# Patient Record
Sex: MALE | Race: WHITE | Employment: OTHER | URBAN - METROPOLITAN AREA
[De-identification: names, ages, dates, MRNs, and addresses within clinical notes are randomized per-mention and may not be internally consistent; named-entity substitution may affect disease eponyms.]

---

## 2017-02-17 ENCOUNTER — TRANSFERRED RECORDS (OUTPATIENT)
Dept: HEALTH INFORMATION MANAGEMENT | Facility: CLINIC | Age: 72
End: 2017-02-17

## 2017-09-11 ENCOUNTER — OFFICE VISIT (OUTPATIENT)
Dept: INTERNAL MEDICINE | Facility: CLINIC | Age: 72
End: 2017-09-11
Payer: COMMERCIAL

## 2017-09-11 VITALS
HEIGHT: 72 IN | SYSTOLIC BLOOD PRESSURE: 124 MMHG | OXYGEN SATURATION: 94 % | TEMPERATURE: 98.1 F | BODY MASS INDEX: 21.36 KG/M2 | WEIGHT: 157.7 LBS | HEART RATE: 88 BPM | DIASTOLIC BLOOD PRESSURE: 78 MMHG

## 2017-09-11 DIAGNOSIS — Z13.6 CARDIOVASCULAR SCREENING; LDL GOAL LESS THAN 160: ICD-10-CM

## 2017-09-11 DIAGNOSIS — J43.9 PULMONARY EMPHYSEMA, UNSPECIFIED EMPHYSEMA TYPE (H): Primary | ICD-10-CM

## 2017-09-11 DIAGNOSIS — Z12.11 COLON CANCER SCREENING: ICD-10-CM

## 2017-09-11 LAB
ANION GAP SERPL CALCULATED.3IONS-SCNC: 9 MMOL/L (ref 3–14)
BUN SERPL-MCNC: 18 MG/DL (ref 7–30)
CALCIUM SERPL-MCNC: 9.3 MG/DL (ref 8.5–10.1)
CHLORIDE SERPL-SCNC: 101 MMOL/L (ref 94–109)
CO2 SERPL-SCNC: 28 MMOL/L (ref 20–32)
CREAT SERPL-MCNC: 0.88 MG/DL (ref 0.66–1.25)
GFR SERPL CREATININE-BSD FRML MDRD: 84 ML/MIN/1.7M2
GLUCOSE SERPL-MCNC: 91 MG/DL (ref 70–99)
POTASSIUM SERPL-SCNC: 4.3 MMOL/L (ref 3.4–5.3)
SODIUM SERPL-SCNC: 138 MMOL/L (ref 133–144)

## 2017-09-11 PROCEDURE — 36415 COLL VENOUS BLD VENIPUNCTURE: CPT | Performed by: INTERNAL MEDICINE

## 2017-09-11 PROCEDURE — 99214 OFFICE O/P EST MOD 30 MIN: CPT | Performed by: INTERNAL MEDICINE

## 2017-09-11 PROCEDURE — 80048 BASIC METABOLIC PNL TOTAL CA: CPT | Performed by: INTERNAL MEDICINE

## 2017-09-11 RX ORDER — TIOTROPIUM BROMIDE 18 UG/1
18 CAPSULE ORAL; RESPIRATORY (INHALATION) DAILY
Qty: 30 CAPSULE | Refills: 11 | Status: SHIPPED | OUTPATIENT
Start: 2017-09-11 | End: 2018-09-10

## 2017-09-11 RX ORDER — ALBUTEROL SULFATE 90 UG/1
1-2 AEROSOL, METERED RESPIRATORY (INHALATION) EVERY 4 HOURS PRN
Qty: 1 INHALER | Refills: 11 | Status: SHIPPED | OUTPATIENT
Start: 2017-09-11 | End: 2018-09-10

## 2017-09-11 NOTE — NURSING NOTE
"Chief Complaint   Patient presents with     COPD       Initial /78  Pulse 88  Temp 98.1  F (36.7  C) (Oral)  Ht 5' 11.5\" (1.816 m)  Wt 157 lb 11.2 oz (71.5 kg)  SpO2 94%  BMI 21.69 kg/m2 Estimated body mass index is 21.69 kg/(m^2) as calculated from the following:    Height as of this encounter: 5' 11.5\" (1.816 m).    Weight as of this encounter: 157 lb 11.2 oz (71.5 kg).  Medication Reconciliation: complete    "

## 2017-09-11 NOTE — MR AVS SNAPSHOT
"              After Visit Summary   9/11/2017    Flavia Luu    MRN: [id number]           Patient Information     Date Of Birth          1945        Visit Information        Provider Department      9/11/2017 2:00 PM Filemon Fletcher MD [facility]        Today's Diagnoses     Pulmonary emphysema, unspecified emphysema type (H)    -  1    CARDIOVASCULAR SCREENING; LDL GOAL LESS THAN 160        Colon cancer screening           Follow-ups after your visit        Future tests that were ordered for you today     Open Future Orders        Priority Expected Expires Ordered    Fecal colorectal cancer screen (FIT) Routine 10/2/2017 12/4/2017 9/11/2017    Basic metabolic panel Routine  9/11/2018 9/11/2017            Who to contact     If you have questions or need follow up information about today's clinic visit or your schedule please contact [facility] directly at [phone].  Normal or non-critical lab and imaging results will be communicated to you by MyChart, letter or phone within 4 business days after the clinic has received the results. If you do not hear from us within 7 days, please contact the clinic through [employer] or phone. If you have a critical or abnormal lab result, we will notify you by phone as soon as possible.  Submit refill requests through [employer] or call your pharmacy and they will forward the refill request to us. Please allow 3 business days for your refill to be completed.          Additional Information About Your Visit        MyChart Information     [employer]Class lets you send messages to your doctor, view your test results, renew your prescriptions, schedule appointments and more. To sign up, go to [email or web address] . Click on \"Log in\" on the left side of the screen, which will take you to the Welcome page. Then click on \"Sign up Now\" on the right side of the page.     You will be asked to enter the access code listed below, as well " "as some personal information. Please follow the directions to create your username and password.     Your access code is: [email or web address]  Expires: 12/10/2017  2:40 PM     Your access code will  in 90 days. If you need help or a new code, please call your [place] clinic or [phone].        Care EveryWhere ID     This is your Care EveryWhere ID. This could be used by other organizations to access your [place] medical records  [id number]        Your Vitals Were     Pulse Temperature Height Pulse Oximetry BMI (Body Mass Index)       88 98.1  F (36.7  C) (Oral) 5' 11.5\" (1.816 m) 94% 21.69 kg/m2        Blood Pressure from Last 3 Encounters:   17 124/78   16 120/66   12/01/15 112/62    Weight from Last 3 Encounters:   17 157 lb 11.2 oz (71.5 kg)   16 158 lb 11.2 oz (72 kg)   12/01/15 161 lb 1.6 oz (73.1 kg)                 Where to get your medicines      These medications were sent to [id number] - [facility] [address]  [address] [address]     Phone:  [phone]     albuterol 108 (90 BASE) MCG/ACT Inhaler    fluticasone-salmeterol 500-50 MCG/DOSE diskus inhaler    tiotropium 18 MCG capsule          Primary Care Provider Office Phone # Fax #    Filemon Fletcher [phone]       [address] [address]        Equal Access to Services     AASHISH CERVANTES : Hadchristopher Agudelo, adria benavidez, qajoseph rowan. So [facility] [phone].    ATENCIÓN: Si habla español, tiene a rico disposición servicios gratuitos de asistencia lingüística. Llame al [phone].    We comply with applicable federal civil rights laws and [place] laws. We do not discriminate on the basis of race, color, national origin, age, disability sex, sexual orientation or gender identity.            Thank you!     Thank you for choosing [facility]  for " your care. Our goal is always to provide you with excellent care. Hearing back from our patients is one way we can continue to improve our services. Please take a few minutes to complete the written survey that you may receive in the mail after your visit with us. Thank you!             Your Updated Medication List - Protect others around you: Learn how to safely use, store and throw away your medicines at www.disposemymeds.org.          This list is accurate as of: 9/11/17  2:40 PM.  Always use your most recent med list.                   Brand Name Dispense Instructions for use Diagnosis    albuterol 108 (90 BASE) MCG/ACT Inhaler    PROAIR HFA    1 Inhaler    Inhale 1-2 puffs into the lungs every 4 hours as needed for shortness of breath / dyspnea    Pulmonary emphysema, unspecified emphysema type (H)       fluticasone-salmeterol 500-50 MCG/DOSE diskus inhaler    ADVAIR    1 Inhaler    Inhale 1 puff into the lungs 2 times daily    Pulmonary emphysema, unspecified emphysema type (H)       IBUPROFEN PO      Take 200 mg by mouth as needed.        ipratropium - albuterol 0.5 mg/2.5 mg/3 mL 0.5-2.5 (3) MG/3ML neb solution    DUONEB    120 vial    Take 1 vial (3 mLs) by nebulization every 4 hours as needed for shortness of breath / dyspnea    Pulmonary emphysema, unspecified emphysema type (H)       tiotropium 18 MCG capsule    SPIRIVA HANDIHALER    30 capsule    Inhale 1 capsule (18 mcg) into the lungs daily Inhale contents of one capsule    Pulmonary emphysema, unspecified emphysema type (H)

## 2017-09-11 NOTE — PROGRESS NOTES
"  SUBJECTIVE:   Flavia Luu is a 72 year old male who presents to clinic today for the following health issues:      COPD Follow-Up    Symptoms are currently: slightly worsened    Current fatigue or dyspnea with ambulation: stable     Shortness of breath: slightly worsened    Increased or change in Cough/Sputum: No    Fever(s): No    Baseline ambulation without stopping to rest:  .75 blocks. Able to walk up 1 flights of stairs without stopping to rest.    Any ER/UC or hospital admissions since your last visit? No     History   Smoking Status     Former Smoker     Packs/day: 1.00     Years: 43.00     Types: Cigarettes     Quit date: 12/8/2008   Smokeless Tobacco     Never Used       Amount of exercise or physical activity: None    Problems taking medications regularly: No    Medication side effects: none  Diet: regular (no restrictions)    Problem list and histories reviewed & adjusted, as indicated.  Additional history: as documented    Labs reviewed in EPIC    Reviewed and updated as needed this visit by clinical staffAllergies       Reviewed and updated as needed this visit by Provider         ROS:  Constitutional, HEENT, cardiovascular, pulmonary, gi and gu systems are negative, except as otherwise noted.      OBJECTIVE:                                                    /78  Pulse 88  Temp 98.1  F (36.7  C) (Oral)  Ht 5' 11.5\" (1.816 m)  Wt 157 lb 11.2 oz (71.5 kg)  SpO2 94%  BMI 21.69 kg/m2  Body mass index is 21.69 kg/(m^2).  GENERAL APPEARANCE: alert, no distress and thin  HENT: nose and mouth without ulcers or lesions  NECK: no adenopathy, no asymmetry, masses, or scars and thyroid normal to palpation  RESP: lungs clear to auscultation - no rales, rhonchi or wheezes  CV: regular rates and rhythm, normal S1 S2, no S3 or S4 and no murmur, click or rub  ABDOMEN: soft, nontender, without hepatosplenomegaly or masses and bowel sounds normal  MS: extremities normal- no gross deformities noted  SKIN: " no suspicious lesions or rashes    Diagnostic test results:  pending       ASSESSMENT/PLAN:                                                    1. Pulmonary emphysema, unspecified emphysema type (H)  Stable- continue inhalers  - albuterol (PROAIR HFA) 108 (90 BASE) MCG/ACT Inhaler; Inhale 1-2 puffs into the lungs every 4 hours as needed for shortness of breath / dyspnea  Dispense: 1 Inhaler; Refill: 11  - tiotropium (SPIRIVA HANDIHALER) 18 MCG capsule; Inhale 1 capsule (18 mcg) into the lungs daily Inhale contents of one capsule  Dispense: 30 capsule; Refill: 11  - fluticasone-salmeterol (ADVAIR) 500-50 MCG/DOSE diskus inhaler; Inhale 1 puff into the lungs 2 times daily  Dispense: 1 Inhaler; Refill: 11    2. CARDIOVASCULAR SCREENING; LDL GOAL LESS THAN 160  - Basic metabolic panel    3. Colon cancer screening  - Fecal colorectal cancer screen (FIT); Future      Follow up with Provider - 6 mo      Filemon Fletcher MD  [facility]

## 2017-09-11 NOTE — LETTER
September 11, 2017      Flavia Luu  [address]  [place] [address]        Dear ,    We are writing to inform you of your test results.    Your test results fall within the expected range(s) or remain unchanged from previous results.  Please continue with current treatment plan.    Resulted Orders   Basic metabolic panel   Result Value Ref Range    Sodium 138 133 - 144 mmol/L    Potassium 4.3 3.4 - 5.3 mmol/L    Chloride 101 94 - 109 mmol/L    Carbon Dioxide 28 20 - 32 mmol/L    Anion Gap 9 3 - 14 mmol/L    Glucose 91 70 - 99 mg/dL    Urea Nitrogen 18 7 - 30 mg/dL    Creatinine 0.88 0.66 - 1.25 mg/dL    GFR Estimate 84 >60 mL/min/1.7m2      Comment:      Non  GFR Calc    GFR Estimate If Black >90 >60 mL/min/1.7m2      Comment:       GFR Calc    Calcium 9.3 8.5 - 10.1 mg/dL       If you have any questions or concerns, please call the clinic at the number listed above.       Sincerely,        Filemon Fletcher MD

## 2017-09-14 DIAGNOSIS — J43.9 PULMONARY EMPHYSEMA, UNSPECIFIED EMPHYSEMA TYPE (H): ICD-10-CM

## 2017-09-14 RX ORDER — IPRATROPIUM BROMIDE AND ALBUTEROL SULFATE 2.5; .5 MG/3ML; MG/3ML
1 SOLUTION RESPIRATORY (INHALATION) EVERY 4 HOURS PRN
Qty: 120 VIAL | Refills: 11 | Status: ON HOLD | OUTPATIENT
Start: 2017-09-14 | End: 2018-07-01

## 2017-09-14 NOTE — TELEPHONE ENCOUNTER
duoneb       Last Written Prescription Date: 09/12/16  Last Fill Quantity: 120vial, # refills: 11    Last Office Visit with G, P or [facility] prescribing provider:  09/11/17   Future Office Visit:   0    Date of Last Asthma Action Plan Letter:   There are no preventive care reminders to display for this patient.   Asthma Control Test: No flowsheet data found.    Date of Last Spirometry Test:   No results found for this or any previous visit.

## 2017-10-02 PROCEDURE — 82274 ASSAY TEST FOR BLOOD FECAL: CPT | Performed by: INTERNAL MEDICINE

## 2017-10-04 DIAGNOSIS — Z12.11 COLON CANCER SCREENING: ICD-10-CM

## 2017-10-04 LAB — HEMOCCULT STL QL IA: NEGATIVE

## 2017-11-26 ENCOUNTER — TELEPHONE (OUTPATIENT)
Dept: INTERNAL MEDICINE | Facility: CLINIC | Age: 72
End: 2017-11-26

## 2017-11-26 NOTE — TELEPHONE ENCOUNTER
Pt would like to be seen  tomorrow  for an emphysema  Flair  Up. Please call pt to advise.      Thank you,   Ankita MYERS   Central Scheduling  [phone]

## 2017-11-27 ENCOUNTER — OFFICE VISIT (OUTPATIENT)
Dept: INTERNAL MEDICINE | Facility: CLINIC | Age: 72
End: 2017-11-27
Payer: COMMERCIAL

## 2017-11-27 VITALS
WEIGHT: 156.2 LBS | SYSTOLIC BLOOD PRESSURE: 136 MMHG | OXYGEN SATURATION: 91 % | DIASTOLIC BLOOD PRESSURE: 82 MMHG | HEART RATE: 109 BPM | TEMPERATURE: 97.9 F | BODY MASS INDEX: 21.48 KG/M2

## 2017-11-27 DIAGNOSIS — J43.9 PULMONARY EMPHYSEMA, UNSPECIFIED EMPHYSEMA TYPE (H): Primary | ICD-10-CM

## 2017-11-27 PROCEDURE — 99214 OFFICE O/P EST MOD 30 MIN: CPT | Performed by: INTERNAL MEDICINE

## 2017-11-27 RX ORDER — PREDNISONE 10 MG/1
TABLET ORAL
Qty: 42 TABLET | Refills: 0 | Status: SHIPPED | OUTPATIENT
Start: 2017-11-27 | End: 2017-12-17

## 2017-11-27 NOTE — TELEPHONE ENCOUNTER
"Flavia Luu is a 72 year old male who calls with emphysema flare up.    NURSING ASSESSMENT:  Description:  States his breathing is bad again. Has had this happen before and says he comes here and gets steroids. Fine when sitting still, but gets very short of breath with movement- breathing feels restricted. Denies any feelings of suffocation. This episode is \"much better\" than the last time he had this happen. Denies chest pain. Denies wheezing. Denies progressively worsening SOB. Able to breathe lying down after a few minutes of adjusting. Denies fever. Denies any difficulty taking a deep breath d/t severe pain.  Onset/duration:  Since Saturday  Associated symptoms:  See above  ILast exam/Treatment:  9/11/17  Allergies:   Allergies   Allergen Reactions     No Known Drug Allergies        NURSING PLAN: Huddle with provider, plan includes schedule with partner today that has opening    RECOMMENDED DISPOSITION:  3 pm appointment today with Dr. Cano  Will comply with recommendation: Yes  If further questions/concerns or if symptoms do not improve, worsen or new symptoms develop, call your PCP or [facility] as soon as possible.      Guideline used:  Telephone Triage Protocols for Nurses, Fifth Edition, Saima Metz RN    "

## 2017-11-27 NOTE — MR AVS SNAPSHOT
"              After Visit Summary   11/27/2017    Flavia Luu    MRN: [id number]           Patient Information     Date Of Birth          1945        Visit Information        Provider Department      11/27/2017 3:00 PM Saman Cano MD [facility][1]        Today's Diagnoses     Pulmonary emphysema, unspecified emphysema type (H)    -  1      Care Instructions    *  Worsening of the COPD.     *  No evidence for bacterial infection based on exam and history,  no antibiotics indicated.     *  Prednisone tapering course over the next 20 days:  40mg qday x4 day, then 30mg qday x 4 day, then 20mg qday x 4 day, then 10mg qday x 4 day, 5mg po qday X 4 days then D/C     *  Continue all other medications at the same doses.  Contact your usual pharmacy if you need refills.     *  If your breathing gets significantly worse, go to the ER without delay.      *  Keep an eye out for the \"generic Advair\" called AirDuo.  This may be replacing Advair on many formularies in the next year.            Follow-ups after your visit        Who to contact     If you have questions or need follow up information about today's clinic visit or your schedule please contact [facility] directly at [phone].  Normal or non-critical lab and imaging results will be communicated to you by [employer], letter or phone within 4 business days after the clinic has received the results. If you do not hear from us within 7 days, please contact the clinic through [employer] or phone. If you have a critical or abnormal lab result, we will notify you by phone as soon as possible.  Submit refill requests through [employer] or call your pharmacy and they will forward the refill request to us. Please allow 3 business days for your refill to be completed.          Additional Information About Your Visit        [employer] Information     [employer] lets you send messages to your doctor, view your test results, " "renew your prescriptions, schedule appointments and more. To sign up, go to [email or web address] . Click on \"Log in\" on the left side of the screen, which will take you to the Welcome page. Then click on \"Sign up Now\" on the right side of the page.     You will be asked to enter the access code listed below, as well as some personal information. Please follow the directions to create your username and password.     Your access code is: [email or web address]  Expires: 12/10/2017  1:40 PM     Your access code will  in 90 days. If you need help or a new code, please call your [place] clinic or [phone].        Care EveryWhere ID     This is your Care EveryWhere ID. This could be used by other organizations to access your [place] medical records  [id number]        Your Vitals Were     Pulse Temperature Pulse Oximetry BMI (Body Mass Index)          109 97.9  F (36.6  C) (Oral) 91% 21.48 kg/m2         Blood Pressure from Last 3 Encounters:   17 136/82   17 124/78   16 120/66    Weight from Last 3 Encounters:   17 156 lb 3.2 oz (70.9 kg)   17 157 lb 11.2 oz (71.5 kg)   16 158 lb 11.2 oz (72 kg)              Today, you had the following     No orders found for display         Today's Medication Changes          These changes are accurate as of: 17  3:48 PM.  If you have any questions, ask your nurse or doctor.               Start taking these medicines.        Dose/Directions    predniSONE 10 MG tablet   Commonly known as:  DELTASONE   Used for:  Pulmonary emphysema, unspecified emphysema type (H)   Started by:  Saman Cano MD        40mg qday x4 day, then 30mg qday x 4 day, then 20mg qday x 4 day, then 10mg qday x 4 day, 5mg po qday X 4 days then D/C   Quantity:  42 tablet   Refills:  0            Where to get your medicines      These medications were sent to [id number] - [place], [place] - [address]  [address] [address]     Phone:  [phone]     predniSONE 10 MG tablet                Primary Care Provider Office Phone # Fax #    Filemon Fletcher [phone]       [address] [address]        Equal Access to Services     MALIK CERVANTES : Hadii aad ku hadasho Soomaali, waaxda luqadaha, qaybta kaalmada adeegyada, waxay desireein haymaryjon aroldoprakash loaizakatalina gooden. So [facility] [phone].    ATENCIÓN: Si habla español, tiene a rico disposición servicios gratuitos de asistencia lingüística. Llame al [phone].    We comply with applicable federal civil rights laws and [place] laws. We do not discriminate on the basis of race, color, national origin, age, disability, sex, sexual orientation, or gender identity.            Thank you!     Thank you for choosing [facility]  for your care. Our goal is always to provide you with excellent care. Hearing back from our patients is one way we can continue to improve our services. Please take a few minutes to complete the written survey that you may receive in the mail after your visit with us. Thank you!             Your Updated Medication List - Protect others around you: Learn how to safely use, store and throw away your medicines at www.disposemymeds.org.          This list is accurate as of: 11/27/17  3:48 PM.  Always use your most recent med list.                   Brand Name Dispense Instructions for use Diagnosis    albuterol 108 (90 BASE) MCG/ACT Inhaler    PROAIR HFA    1 Inhaler    Inhale 1-2 puffs into the lungs every 4 hours as needed for shortness of breath / dyspnea    Pulmonary emphysema, unspecified emphysema type (H)       fluticasone-salmeterol 500-50 MCG/DOSE diskus inhaler    ADVAIR    1 Inhaler    Inhale 1 puff into the lungs 2 times daily    Pulmonary emphysema, unspecified emphysema type (H)       IBUPROFEN PO      Take 200 mg by mouth as needed.        ipratropium - albuterol 0.5 mg/2.5 mg/3 mL 0.5-2.5 (3)  MG/3ML neb solution    DUONEB    120 vial    Take 1 vial (3 mLs) by nebulization every 4 hours as needed for shortness of breath / dyspnea    Pulmonary emphysema, unspecified emphysema type (H)       predniSONE 10 MG tablet    DELTASONE    42 tablet    40mg qday x4 day, then 30mg qday x 4 day, then 20mg qday x 4 day, then 10mg qday x 4 day, 5mg po qday X 4 days then D/C    Pulmonary emphysema, unspecified emphysema type (H)       tiotropium 18 MCG capsule    SPIRIVA HANDIHALER    30 capsule    Inhale 1 capsule (18 mcg) into the lungs daily Inhale contents of one capsule    Pulmonary emphysema, unspecified emphysema type (H)

## 2017-11-27 NOTE — PATIENT INSTRUCTIONS
"*  Worsening of the COPD.     *  No evidence for bacterial infection based on exam and history,  no antibiotics indicated.     *  Prednisone tapering course over the next 20 days:  40mg qday x4 day, then 30mg qday x 4 day, then 20mg qday x 4 day, then 10mg qday x 4 day, 5mg po qday X 4 days then D/C     *  Continue all other medications at the same doses.  Contact your usual pharmacy if you need refills.     *  If your breathing gets significantly worse, go to the ER without delay.      *  Keep an eye out for the \"generic Advair\" called AirDuo.  This may be replacing Advair on many formularies in the next year.    "

## 2017-11-27 NOTE — NURSING NOTE
"Chief Complaint   Patient presents with     COPD     increased SOB  X 3 days       Initial /82  Pulse 109  Temp 97.9  F (36.6  C) (Oral)  Wt 156 lb 3.2 oz (70.9 kg)  SpO2 91%  BMI 21.48 kg/m2 Estimated body mass index is 21.48 kg/(m^2) as calculated from the following:    Height as of 9/11/17: 5' 11.5\" (1.816 m).    Weight as of this encounter: 156 lb 3.2 oz (70.9 kg).  Medication Reconciliation: complete   Misty Demarco CMA      "

## 2017-11-27 NOTE — PROGRESS NOTES
SUBJECTIVE:   Flavia Luu is a 72 year old male who presents to clinic today for the following health issues:      COPD Follow-Up    Symptoms are currently: much worse    Current fatigue or dyspnea with ambulation: worsened from baseline    Shortness of breath: much worse    Increased or change in Cough/Sputum: Yes-  Slightly increased    Fever(s): No    Baseline ambulation without stopping to rest:  .75 blocks. Able to walk up 1 flights of stairs without stopping to rest.    Any ER/UC or hospital admissions since your last visit? No     History   Smoking Status     Former Smoker     Packs/day: 1.00     Years: 43.00     Types: Cigarettes     Quit date: 12/8/2008   Smokeless Tobacco     Never Used     No results found for: FEV1, KLP5OLL      Amount of exercise or physical activity: None    Problems taking medications regularly: No    Medication side effects: none    Diet: regular (no restrictions)            Problem list and histories reviewed & adjusted, as indicated.  Additional history: as documented        Reviewed and updated as needed this visit by clinical staffTobacco  Allergies       Reviewed and updated as needed this visit by Provider           Past Medical History:  ---------------------------  Past Medical History:   Diagnosis Date     Atrial flutter (H)     sp ablation     COPD (chronic obstructive pulmonary disease) (H)      Emphysema of lung (H)      PVC (premature ventricular contraction)     LVOT, moderately frequent     Tobacco use disorder        Past Surgical History:  ---------------------------  Past Surgical History:   Procedure Laterality Date     C NONSPECIFIC PROCEDURE      vv stripping on left     CATARACT IOL, RT/LT  2012    bilat     EYE SURGERY       H ABLATION ATRIAL FLUTTER  1/17/2013            Current Medications:  ---------------------------  Current Outpatient Prescriptions   Medication Sig Dispense Refill     ipratropium - albuterol 0.5 mg/2.5 mg/3 mL (DUONEB) 0.5-2.5 (3)  MG/3ML neb solution Take 1 vial (3 mLs) by nebulization every 4 hours as needed for shortness of breath / dyspnea 120 vial 11     albuterol (PROAIR HFA) 108 (90 BASE) MCG/ACT Inhaler Inhale 1-2 puffs into the lungs every 4 hours as needed for shortness of breath / dyspnea 1 Inhaler 11     tiotropium (SPIRIVA HANDIHALER) 18 MCG capsule Inhale 1 capsule (18 mcg) into the lungs daily Inhale contents of one capsule 30 capsule 11     fluticasone-salmeterol (ADVAIR) 500-50 MCG/DOSE diskus inhaler Inhale 1 puff into the lungs 2 times daily 1 Inhaler 11     IBUPROFEN PO Take 200 mg by mouth as needed.         Allergies:  -------------  Allergies   Allergen Reactions     No Known Drug Allergies        Social History:  -------------------  Social History     Social History     Marital status:      Spouse name: N/A     Number of children: 2     Years of education: N/A     Occupational History      [employer]     Social History Main Topics     Smoking status: Former Smoker     Packs/day: 1.00     Years: 43.00     Types: Cigarettes     Quit date: 12/8/2008     Smokeless tobacco: Never Used     Alcohol use 0.0 oz/week     0 Standard drinks or equivalent per week      Comment: very rare     Drug use: No     Sexual activity: Not Currently     Other Topics Concern     Not on file     Social History Narrative       Family Medical History:  ------------------------------  Family History   Problem Relation Age of Onset     CANCER Mother      DIABETES Father          ROS:  REVIEW OF SYSTEMS:    RESP: positive for cough, dyspnea, wheezing; negative for hemoptysis   CV: negative for chest pain, palpitations, PND, orthopnea;  GI: negative for dysphagia, N/V, pain, melena, diarrhea and constipation  NEURO: negative for numbness/tingling, paralysis, incoordination, or focal weakness     OBJECTIVE:                                                    /82  Pulse 109  Temp 97.9  F (36.6  C) (Oral)  Wt 156 lb 3.2 oz  "(70.9 kg)  SpO2 91%  BMI 21.48 kg/m2     GENERAL alert and no distress  EYES:  Normal sclera,conjunctiva, EOMI  HENT: oral and posterior pharynx without lesions or erythema, facies symmetric  NECK: Neck supple. No LAD, without thyroidmegaly or JVD., Carotids without bruits.  RESP: breath sounds quiet but clear, diminished respiratory excursion, prolonged expiratory phase, few scattered  rhonci, scattered end exp. wheezes, no rales   CV: RRR normal S1S2 without murmurs, rubs or gallops. PMI normal  LYMPH: no cervical lymph adenopathy appreciated  MS: extremities- no gross deformities of the visible extremities noted, no edema  PSYCH: Alert and oriented times 3; speech- coherent  SKIN:  No obvious significant skin lesions on visible portions of face          ASSESSMENT/PLAN:                                                      (J43.9) Pulmonary emphysema, unspecified emphysema type (H)  (primary encounter diagnosis)  Comment: Discussed general issues of COPD, including pathophysiology, ways it will affect the pt., when to seek help, reviewed the typical medications (how they are taken, how they help)   Plan: predniSONE (DELTASONE) 10 MG tablet             *  Worsening of the COPD.     *  No evidence for bacterial infection based on exam and history,  no antibiotics indicated.     *  Prednisone tapering course over the next 20 days:  40mg qday x4 day, then 30mg qday x 4 day, then 20mg qday x 4 day, then 10mg qday x 4 day, 5mg po qday X 4 days then D/C     *  Continue all other medications at the same doses.  Contact your usual pharmacy if you need refills.     *  If your breathing gets significantly worse, go to the ER without delay.      *  Keep an eye out for the \"generic Advair\" called AirDuo.  This may be replacing Advair on many formularies in the next year.        See Patient Instructions    CHARITY NEGRO M.D., MD  [facility]   "

## 2018-01-09 ENCOUNTER — RADIANT APPOINTMENT (OUTPATIENT)
Dept: GENERAL RADIOLOGY | Facility: CLINIC | Age: 73
End: 2018-01-09
Attending: INTERNAL MEDICINE
Payer: COMMERCIAL

## 2018-01-09 ENCOUNTER — OFFICE VISIT (OUTPATIENT)
Dept: INTERNAL MEDICINE | Facility: CLINIC | Age: 73
End: 2018-01-09
Payer: COMMERCIAL

## 2018-01-09 VITALS
OXYGEN SATURATION: 90 % | DIASTOLIC BLOOD PRESSURE: 86 MMHG | SYSTOLIC BLOOD PRESSURE: 144 MMHG | HEIGHT: 71 IN | HEART RATE: 120 BPM | TEMPERATURE: 97.4 F | WEIGHT: 154.7 LBS | BODY MASS INDEX: 21.66 KG/M2

## 2018-01-09 DIAGNOSIS — J44.1 CHRONIC OBSTRUCTIVE PULMONARY DISEASE WITH ACUTE EXACERBATION (H): ICD-10-CM

## 2018-01-09 DIAGNOSIS — J44.1 CHRONIC OBSTRUCTIVE PULMONARY DISEASE WITH ACUTE EXACERBATION (H): Primary | ICD-10-CM

## 2018-01-09 PROCEDURE — 71046 X-RAY EXAM CHEST 2 VIEWS: CPT | Mod: FY

## 2018-01-09 PROCEDURE — 99214 OFFICE O/P EST MOD 30 MIN: CPT | Performed by: INTERNAL MEDICINE

## 2018-01-09 RX ORDER — DOXYCYCLINE HYCLATE 100 MG
100 TABLET ORAL 2 TIMES DAILY
Qty: 14 TABLET | Refills: 0 | Status: SHIPPED | OUTPATIENT
Start: 2018-01-09 | End: 2018-04-27

## 2018-01-09 RX ORDER — SULFAMETHOXAZOLE/TRIMETHOPRIM 800-160 MG
1 TABLET ORAL 2 TIMES DAILY
Qty: 14 TABLET | Refills: 0 | Status: CANCELLED | OUTPATIENT
Start: 2018-01-09

## 2018-01-09 RX ORDER — PREDNISONE 10 MG/1
TABLET ORAL
Qty: 42 TABLET | Refills: 0 | Status: SHIPPED | OUTPATIENT
Start: 2018-01-09 | End: 2018-06-28

## 2018-01-09 NOTE — PROGRESS NOTES
"  SUBJECTIVE:   Flavia Luu is a 72 year old male who presents to clinic today for the following health issues:        COPD flare    Notes more recent increase in HENRIQUEZ, wheezing, use of nebs and productive cough over last 10 d    Symptoms are currently: much worse    Current fatigue or dyspnea with ambulation: none    Shortness of breath: much worse    Increased or change in Cough/Sputum: Yes-  worse    Fever(s): No    Baseline ambulation without stopping to rest:  Not much , < 1 rooms. Able to walk up <1 flights of stairs without stopping to rest.    Any ER/UC or hospital admissions since your last visit? No     History   Smoking Status     Former Smoker     Packs/day: 1.00     Years: 43.00     Types: Cigarettes     Quit date: 12/8/2008   Smokeless Tobacco     Never Used         Amount of exercise or physical activity: None    Problems taking medications regularly: No    Medication side effects: none    Diet: regular (no restrictions)      Problem list and histories reviewed & adjusted, as indicated.  Additional history: as documented    Labs reviewed in EPIC    Reviewed and updated as needed this visit by clinical staffTobacco  Allergies  Meds  Med Hx  Surg Hx  Fam Hx  Soc Hx      Reviewed and updated as needed this visit by Provider         ROS:  Constitutional, HEENT, cardiovascular, pulmonary, gi and gu systems are negative, except as otherwise noted.      OBJECTIVE:                                                    /86  Pulse 120  Temp 97.4  F (36.3  C) (Oral)  Ht 5' 10.75\" (1.797 m)  Wt 154 lb 11.2 oz (70.2 kg)  SpO2 90%  BMI 21.73 kg/m2  Body mass index is 21.73 kg/(m^2).  GENERAL APPEARANCE: alert, no distress, fatigued and thin  HENT: nose and mouth without ulcers or lesions and normal cephalic/atraumatic  NECK: no adenopathy, no asymmetry, masses, or scars and thyroid normal to palpation  RESP: greatly diminished BS bilat. No wheezing or crackles  CV: regular rates and rhythm, normal " S1 S2, no S3 or S4 and no murmur, click or rub  SKIN: no suspicious lesions or rashes    Diagnostic test results:  Cxr: small nodular infiltrate ? L upper lobe on AP, otherwise nothing acute       ASSESSMENT/PLAN:                                                    1. Chronic obstructive pulmonary disease with acute exacerbation (H)  antibx given productive sputum + steroids  - XR Chest 2 Views; Future  - predniSONE (DELTASONE) 10 MG tablet; 4 tabs daily for 6 days, then 3 tabs daily for 3 days, then 2 tabs daily for 3 days, then 1 tab daily for 3 days, then stop  Dispense: 42 tablet; Refill: 0  - doxycycline (VIBRA-TABS) 100 MG tablet; Take 1 tablet (100 mg) by mouth 2 times daily  Dispense: 14 tablet; Refill: 0        Filemon Fletcher MD  [facility]

## 2018-01-09 NOTE — MR AVS SNAPSHOT
"              After Visit Summary   2018    Flavia Luu    MRN: [id number]           Patient Information     Date Of Birth          1945        Visit Information        Provider Department      2018 1:20 PM Filemon Fletcher MD [facility]        Today's Diagnoses     Chronic obstructive pulmonary disease with acute exacerbation (H)    -  1       Follow-ups after your visit        Who to contact     If you have questions or need follow up information about today's clinic visit or your schedule please contact [facility] directly at [phone].  Normal or non-critical lab and imaging results will be communicated to you by MyChart, letter or phone within 4 business days after the clinic has received the results. If you do not hear from us within 7 days, please contact the clinic through [employer] or phone. If you have a critical or abnormal lab result, we will notify you by phone as soon as possible.  Submit refill requests through [employer] or call your pharmacy and they will forward the refill request to us. Please allow 3 business days for your refill to be completed.          Additional Information About Your Visit        MyChart Information     [employer] lets you send messages to your doctor, view your test results, renew your prescriptions, schedule appointments and more. To sign up, go to [email or web address] . Click on \"Log in\" on the left side of the screen, which will take you to the Welcome page. Then click on \"Sign up Now\" on the right side of the page.     You will be asked to enter the access code listed below, as well as some personal information. Please follow the directions to create your username and password.     Your access code is: [email or web address]  Expires: 2018  2:26 PM     Your access code will  in 90 days. If you need help or a new code, please call your [facility] or [phone].        Care EveryWhere ID     This is " "your Care EveryWhere ID. This could be used by other organizations to access your [place] medical records  [id number]        Your Vitals Were     Pulse Temperature Height Pulse Oximetry BMI (Body Mass Index)       120 97.4  F (36.3  C) (Oral) 5' 10.75\" (1.797 m) 90% 21.73 kg/m2        Blood Pressure from Last 3 Encounters:   01/09/18 144/86   11/27/17 136/82   09/11/17 124/78    Weight from Last 3 Encounters:   01/09/18 154 lb 11.2 oz (70.2 kg)   11/27/17 156 lb 3.2 oz (70.9 kg)   09/11/17 157 lb 11.2 oz (71.5 kg)                 Today's Medication Changes          These changes are accurate as of: 1/9/18  2:26 PM.  If you have any questions, ask your nurse or doctor.               Start taking these medicines.        Dose/Directions    doxycycline 100 MG tablet   Commonly known as:  VIBRA-TABS   Used for:  Chronic obstructive pulmonary disease with acute exacerbation (H)   Started by:  Filemon Fletcher MD        Dose:  100 mg   Take 1 tablet (100 mg) by mouth 2 times daily   Quantity:  14 tablet   Refills:  0       predniSONE 10 MG tablet   Commonly known as:  DELTASONE   Used for:  Chronic obstructive pulmonary disease with acute exacerbation (H)   Started by:  Filemon Fletcher MD        4 tabs daily for 6 days, then 3 tabs daily for 3 days, then 2 tabs daily for 3 days, then 1 tab daily for 3 days, then stop   Quantity:  42 tablet   Refills:  0            Where to get your medicines      These medications were sent to [id number] - [place], [place] - [address]  [address] [address]     Phone:  [phone]     doxycycline 100 MG tablet    predniSONE 10 MG tablet                Primary Care Provider Office Phone # Fax #    Filemon Fletcher [phone]       [address] [facility] [address]        Equal Access to Services     MALIK CERVANTES AH: Nasima Agudelo, adria lubharti, joseph field " pedro reynoldscoleenkatalina templeton'aan ah. So [facility] [phone].    ATENCIÓN: Si jelean jack, tiene a rico disposición servicios gratuitos de asistencia lingüística. Suhail al [phone].    We comply with applicable federal civil rights laws and [place] laws. We do not discriminate on the basis of race, color, national origin, age, disability, sex, sexual orientation, or gender identity.            Thank you!     Thank you for choosing [facility]  for your care. Our goal is always to provide you with excellent care. Hearing back from our patients is one way we can continue to improve our services. Please take a few minutes to complete the written survey that you may receive in the mail after your visit with us. Thank you!             Your Updated Medication List - Protect others around you: Learn how to safely use, store and throw away your medicines at www.disposemymeds.org.          This list is accurate as of: 1/9/18  2:26 PM.  Always use your most recent med list.                   Brand Name Dispense Instructions for use Diagnosis    albuterol 108 (90 BASE) MCG/ACT Inhaler    PROAIR HFA    1 Inhaler    Inhale 1-2 puffs into the lungs every 4 hours as needed for shortness of breath / dyspnea    Pulmonary emphysema, unspecified emphysema type (H)       doxycycline 100 MG tablet    VIBRA-TABS    14 tablet    Take 1 tablet (100 mg) by mouth 2 times daily    Chronic obstructive pulmonary disease with acute exacerbation (H)       fluticasone-salmeterol 500-50 MCG/DOSE diskus inhaler    ADVAIR    1 Inhaler    Inhale 1 puff into the lungs 2 times daily    Pulmonary emphysema, unspecified emphysema type (H)       IBUPROFEN PO      Take 200 mg by mouth as needed.        ipratropium - albuterol 0.5 mg/2.5 mg/3 mL 0.5-2.5 (3) MG/3ML neb solution    DUONEB    120 vial    Take 1 vial (3 mLs) by nebulization every 4 hours as needed for shortness of breath / dyspnea    Pulmonary emphysema, unspecified emphysema type  (H)       predniSONE 10 MG tablet    DELTASONE    42 tablet    4 tabs daily for 6 days, then 3 tabs daily for 3 days, then 2 tabs daily for 3 days, then 1 tab daily for 3 days, then stop    Chronic obstructive pulmonary disease with acute exacerbation (H)       tiotropium 18 MCG capsule    SPIRIVA HANDIHALER    30 capsule    Inhale 1 capsule (18 mcg) into the lungs daily Inhale contents of one capsule    Pulmonary emphysema, unspecified emphysema type (H)

## 2018-02-20 ENCOUNTER — TRANSFERRED RECORDS (OUTPATIENT)
Dept: HEALTH INFORMATION MANAGEMENT | Facility: CLINIC | Age: 73
End: 2018-02-20

## 2018-04-27 ENCOUNTER — OFFICE VISIT (OUTPATIENT)
Dept: INTERNAL MEDICINE | Facility: CLINIC | Age: 73
End: 2018-04-27
Payer: COMMERCIAL

## 2018-04-27 VITALS
SYSTOLIC BLOOD PRESSURE: 110 MMHG | OXYGEN SATURATION: 94 % | HEART RATE: 81 BPM | TEMPERATURE: 97.5 F | WEIGHT: 156.3 LBS | BODY MASS INDEX: 21.95 KG/M2 | DIASTOLIC BLOOD PRESSURE: 84 MMHG

## 2018-04-27 DIAGNOSIS — J43.9 PULMONARY EMPHYSEMA, UNSPECIFIED EMPHYSEMA TYPE (H): Primary | ICD-10-CM

## 2018-04-27 DIAGNOSIS — J44.1 COPD EXACERBATION (H): ICD-10-CM

## 2018-04-27 PROCEDURE — 99214 OFFICE O/P EST MOD 30 MIN: CPT | Performed by: INTERNAL MEDICINE

## 2018-04-27 NOTE — PROGRESS NOTES
SUBJECTIVE:   Flavia Luu is a 72 year old male who presents to clinic today for the following health issues:      COPD Follow-Up    Symptoms are currently: slightly worsened, similar to how it was in january    Current fatigue or dyspnea with ambulation: worsened from baseline    Shortness of breath: slightly worsened    Increased or change in Cough/Sputum: Yes-  Has some yellow mucous but slightly better than it was in january    Fever(s): No    Baseline ambulation without stopping to rest: Can walk to mailbox and back without stopping to rest. Able to walk up 1 flights of stairs without stopping to rest.  Any ER/UC or hospital admissions since your last visit? No   94% o2 after sitting for 5-10 minutes    History   Smoking Status     Former Smoker     Packs/day: 1.00     Years: 43.00     Types: Cigarettes     Quit date: 12/8/2008   Smokeless Tobacco     Never Used       Amount of exercise or physical activity: 4-5 days/week for an average of 15-30 minutes    Problems taking medications regularly: No    Medication side effects: none    Diet: regular (no restrictions)    Has been noticing some decreased healing time with minor infections while on the prednisone and is wondering if that decreases the benefit of taking the steroid.      Problem list and histories reviewed & adjusted, as indicated.  Additional history: as documented    Labs reviewed in EPIC    Reviewed and updated as needed this visit by clinical staff       Reviewed and updated as needed this visit by Provider         ROS:  Constitutional, HEENT, cardiovascular, pulmonary, gi and gu systems are negative, except as otherwise noted.    OBJECTIVE:                                                    /84 (BP Location: Right arm, Patient Position: Chair, Cuff Size: Adult Regular)  Pulse 81  Temp 97.5  F (36.4  C) (Oral)  Wt 156 lb 4.8 oz (70.9 kg)  SpO2 94%  BMI 21.95 kg/m2  Body mass index is 21.95 kg/(m^2).  GENERAL APPEARANCE: alert and no  distress  HENT: ear canals and TM's normal and nose and mouth without ulcers or lesions  NECK: no adenopathy, no asymmetry, masses, or scars and thyroid normal to palpation  RESP: Diminished breath sounds with some faint wheezing noted bilaterally  CV: regular rates and rhythm, normal S1 S2, no S3 or S4 and no murmur, click or rub    Diagnostic test results:  none      ASSESSMENT/PLAN:                                                    1. Pulmonary emphysema, unspecified emphysema type (H)  2. COPD exacerbation (H)  - amoxicillin-clavulanate (AUGMENTIN) 875-125 MG per tablet; Take 1 tablet by mouth 2 times daily for 7 days  Dispense: 14 tablet; Refill: 0           Filemon Fletcher MD  [facility]

## 2018-06-28 ENCOUNTER — HOSPITAL ENCOUNTER (INPATIENT)
Facility: CLINIC | Age: 73
LOS: 3 days | Discharge: HOME | DRG: 190 | End: 2018-07-01
Attending: EMERGENCY MEDICINE | Admitting: HOSPITALIST
Payer: COMMERCIAL

## 2018-06-28 ENCOUNTER — APPOINTMENT (OUTPATIENT)
Dept: CT IMAGING | Facility: CLINIC | Age: 73
DRG: 190 | End: 2018-06-28
Attending: EMERGENCY MEDICINE
Payer: COMMERCIAL

## 2018-06-28 ENCOUNTER — OFFICE VISIT (OUTPATIENT)
Dept: INTERNAL MEDICINE | Facility: CLINIC | Age: 73
End: 2018-06-28
Payer: COMMERCIAL

## 2018-06-28 ENCOUNTER — RADIANT APPOINTMENT (OUTPATIENT)
Dept: GENERAL RADIOLOGY | Facility: CLINIC | Age: 73
End: 2018-06-28
Attending: INTERNAL MEDICINE
Payer: COMMERCIAL

## 2018-06-28 VITALS
SYSTOLIC BLOOD PRESSURE: 122 MMHG | DIASTOLIC BLOOD PRESSURE: 74 MMHG | HEART RATE: 118 BPM | TEMPERATURE: 100.3 F | WEIGHT: 158.6 LBS | OXYGEN SATURATION: 96 % | BODY MASS INDEX: 22.28 KG/M2 | RESPIRATION RATE: 32 BRPM

## 2018-06-28 DIAGNOSIS — R06.02 SOB (SHORTNESS OF BREATH): ICD-10-CM

## 2018-06-28 DIAGNOSIS — J44.1 COPD EXACERBATION (H): ICD-10-CM

## 2018-06-28 DIAGNOSIS — Z98.890 S/P ABLATION OF ATRIAL FLUTTER: Primary | ICD-10-CM

## 2018-06-28 DIAGNOSIS — R50.9 FEVER, UNSPECIFIED FEVER CAUSE: ICD-10-CM

## 2018-06-28 DIAGNOSIS — J43.1 PANLOBULAR EMPHYSEMA (H): Primary | ICD-10-CM

## 2018-06-28 DIAGNOSIS — Z86.79 S/P ABLATION OF ATRIAL FLUTTER: Primary | ICD-10-CM

## 2018-06-28 LAB
ANION GAP SERPL CALCULATED.3IONS-SCNC: 9 MMOL/L (ref 3–14)
BASOPHILS # BLD AUTO: 0 10E9/L (ref 0–0.2)
BASOPHILS NFR BLD AUTO: 0.4 %
BUN SERPL-MCNC: 20 MG/DL (ref 7–30)
CALCIUM SERPL-MCNC: 9.2 MG/DL (ref 8.5–10.1)
CHLORIDE SERPL-SCNC: 97 MMOL/L (ref 94–109)
CO2 SERPL-SCNC: 27 MMOL/L (ref 20–32)
CREAT SERPL-MCNC: 0.88 MG/DL (ref 0.66–1.25)
CREAT SERPL-MCNC: 1 MG/DL (ref 0.66–1.25)
D DIMER PPP FEU-MCNC: 4.6 UG/ML FEU (ref 0–0.5)
DIFFERENTIAL METHOD BLD: ABNORMAL
EOSINOPHIL # BLD AUTO: 0 10E9/L (ref 0–0.7)
EOSINOPHIL NFR BLD AUTO: 0 %
ERYTHROCYTE [DISTWIDTH] IN BLOOD BY AUTOMATED COUNT: 14.1 % (ref 10–15)
GFR SERPL CREATININE-BSD FRML MDRD: 73 ML/MIN/1.7M2
GFR SERPL CREATININE-BSD FRML MDRD: 84 ML/MIN/1.7M2
GLUCOSE SERPL-MCNC: 112 MG/DL (ref 70–99)
HCT VFR BLD AUTO: 48.8 % (ref 40–53)
HGB BLD-MCNC: 16 G/DL (ref 13.3–17.7)
INTERPRETATION ECG - MUSE: NORMAL
LACTATE BLD-SCNC: 1.6 MMOL/L (ref 0.4–1.9)
LYMPHOCYTES # BLD AUTO: 0.2 10E9/L (ref 0.8–5.3)
LYMPHOCYTES NFR BLD AUTO: 3.8 %
MCH RBC QN AUTO: 32.3 PG (ref 26.5–33)
MCHC RBC AUTO-ENTMCNC: 32.8 G/DL (ref 31.5–36.5)
MCV RBC AUTO: 99 FL (ref 78–100)
MONOCYTES # BLD AUTO: 0.7 10E9/L (ref 0–1.3)
MONOCYTES NFR BLD AUTO: 13.6 %
NEUTROPHILS # BLD AUTO: 3.9 10E9/L (ref 1.6–8.3)
NEUTROPHILS NFR BLD AUTO: 82.2 %
PLATELET # BLD AUTO: 140 10E9/L (ref 150–450)
PLATELET # BLD AUTO: 152 10E9/L (ref 150–450)
POTASSIUM SERPL-SCNC: 4.9 MMOL/L (ref 3.4–5.3)
PROCALCITONIN SERPL-MCNC: 0.62 NG/ML
PROCALCITONIN SERPL-MCNC: NORMAL NG/ML
RBC # BLD AUTO: 4.95 10E12/L (ref 4.4–5.9)
SODIUM SERPL-SCNC: 133 MMOL/L (ref 133–144)
TROPONIN I SERPL-MCNC: <0.015 UG/L (ref 0–0.04)
TROPONIN I SERPL-MCNC: NORMAL UG/L (ref 0–0.04)
WBC # BLD AUTO: 4.8 10E9/L (ref 4–11)

## 2018-06-28 PROCEDURE — 12000000 ZZH R&B MED SURG/OB

## 2018-06-28 PROCEDURE — 25000128 H RX IP 250 OP 636: Performed by: HOSPITALIST

## 2018-06-28 PROCEDURE — 99207 ZZC OFFICE-HOSPITAL ADMIT: CPT | Performed by: INTERNAL MEDICINE

## 2018-06-28 PROCEDURE — 40000275 ZZH STATISTIC RCP TIME EA 10 MIN

## 2018-06-28 PROCEDURE — 94640 AIRWAY INHALATION TREATMENT: CPT

## 2018-06-28 PROCEDURE — 85049 AUTOMATED PLATELET COUNT: CPT | Performed by: HOSPITALIST

## 2018-06-28 PROCEDURE — 80048 BASIC METABOLIC PNL TOTAL CA: CPT | Performed by: INTERNAL MEDICINE

## 2018-06-28 PROCEDURE — 25000128 H RX IP 250 OP 636: Performed by: EMERGENCY MEDICINE

## 2018-06-28 PROCEDURE — 71046 X-RAY EXAM CHEST 2 VIEWS: CPT | Mod: FY

## 2018-06-28 PROCEDURE — 99285 EMERGENCY DEPT VISIT HI MDM: CPT | Mod: 25

## 2018-06-28 PROCEDURE — 25000125 ZZHC RX 250: Performed by: HOSPITALIST

## 2018-06-28 PROCEDURE — 71260 CT THORAX DX C+: CPT

## 2018-06-28 PROCEDURE — 85025 COMPLETE CBC W/AUTO DIFF WBC: CPT | Performed by: INTERNAL MEDICINE

## 2018-06-28 PROCEDURE — 82565 ASSAY OF CREATININE: CPT | Performed by: HOSPITALIST

## 2018-06-28 PROCEDURE — 36415 COLL VENOUS BLD VENIPUNCTURE: CPT | Performed by: INTERNAL MEDICINE

## 2018-06-28 PROCEDURE — 87040 BLOOD CULTURE FOR BACTERIA: CPT | Performed by: HOSPITALIST

## 2018-06-28 PROCEDURE — 93005 ELECTROCARDIOGRAM TRACING: CPT

## 2018-06-28 PROCEDURE — 99223 1ST HOSP IP/OBS HIGH 75: CPT | Mod: AI | Performed by: HOSPITALIST

## 2018-06-28 PROCEDURE — 84484 ASSAY OF TROPONIN QUANT: CPT | Performed by: EMERGENCY MEDICINE

## 2018-06-28 PROCEDURE — 83605 ASSAY OF LACTIC ACID: CPT | Performed by: EMERGENCY MEDICINE

## 2018-06-28 PROCEDURE — 85379 FIBRIN DEGRADATION QUANT: CPT | Performed by: EMERGENCY MEDICINE

## 2018-06-28 PROCEDURE — 84145 PROCALCITONIN (PCT): CPT | Performed by: EMERGENCY MEDICINE

## 2018-06-28 PROCEDURE — 96374 THER/PROPH/DIAG INJ IV PUSH: CPT | Mod: 59

## 2018-06-28 PROCEDURE — 36415 COLL VENOUS BLD VENIPUNCTURE: CPT | Performed by: HOSPITALIST

## 2018-06-28 PROCEDURE — 25000125 ZZHC RX 250: Performed by: EMERGENCY MEDICINE

## 2018-06-28 PROCEDURE — 96361 HYDRATE IV INFUSION ADD-ON: CPT

## 2018-06-28 RX ORDER — ALBUTEROL SULFATE 0.83 MG/ML
3 SOLUTION RESPIRATORY (INHALATION)
Status: DISCONTINUED | OUTPATIENT
Start: 2018-06-28 | End: 2018-07-01 | Stop reason: HOSPADM

## 2018-06-28 RX ORDER — PROCHLORPERAZINE 25 MG
12.5 SUPPOSITORY, RECTAL RECTAL EVERY 12 HOURS PRN
Status: DISCONTINUED | OUTPATIENT
Start: 2018-06-28 | End: 2018-07-01 | Stop reason: HOSPADM

## 2018-06-28 RX ORDER — LEVOFLOXACIN 750 MG/1
750 TABLET, FILM COATED ORAL DAILY
Qty: 5 TABLET | Refills: 0 | Status: ON HOLD | OUTPATIENT
Start: 2018-06-28 | End: 2018-07-01

## 2018-06-28 RX ORDER — METHYLPREDNISOLONE SODIUM SUCCINATE 125 MG/2ML
60 INJECTION, POWDER, LYOPHILIZED, FOR SOLUTION INTRAMUSCULAR; INTRAVENOUS EVERY 6 HOURS
Status: DISCONTINUED | OUTPATIENT
Start: 2018-06-28 | End: 2018-06-30

## 2018-06-28 RX ORDER — IPRATROPIUM BROMIDE AND ALBUTEROL SULFATE 2.5; .5 MG/3ML; MG/3ML
3 SOLUTION RESPIRATORY (INHALATION)
Status: DISCONTINUED | OUTPATIENT
Start: 2018-06-28 | End: 2018-07-01 | Stop reason: HOSPADM

## 2018-06-28 RX ORDER — IOPAMIDOL 755 MG/ML
62 INJECTION, SOLUTION INTRAVASCULAR ONCE
Status: COMPLETED | OUTPATIENT
Start: 2018-06-28 | End: 2018-06-28

## 2018-06-28 RX ORDER — PROCHLORPERAZINE MALEATE 5 MG
5 TABLET ORAL EVERY 6 HOURS PRN
Status: DISCONTINUED | OUTPATIENT
Start: 2018-06-28 | End: 2018-07-01 | Stop reason: HOSPADM

## 2018-06-28 RX ORDER — ALBUTEROL SULFATE 0.83 MG/ML
2.5 SOLUTION RESPIRATORY (INHALATION) DAILY
Status: ON HOLD | COMMUNITY
End: 2018-07-01

## 2018-06-28 RX ORDER — ONDANSETRON 4 MG/1
4 TABLET, ORALLY DISINTEGRATING ORAL EVERY 6 HOURS PRN
Status: DISCONTINUED | OUTPATIENT
Start: 2018-06-28 | End: 2018-07-01 | Stop reason: HOSPADM

## 2018-06-28 RX ORDER — LEVOFLOXACIN 5 MG/ML
500 INJECTION, SOLUTION INTRAVENOUS EVERY 24 HOURS
Status: DISCONTINUED | OUTPATIENT
Start: 2018-06-28 | End: 2018-06-29

## 2018-06-28 RX ORDER — BISACODYL 10 MG
10 SUPPOSITORY, RECTAL RECTAL DAILY PRN
Status: DISCONTINUED | OUTPATIENT
Start: 2018-06-28 | End: 2018-07-01 | Stop reason: HOSPADM

## 2018-06-28 RX ORDER — NALOXONE HYDROCHLORIDE 0.4 MG/ML
.1-.4 INJECTION, SOLUTION INTRAMUSCULAR; INTRAVENOUS; SUBCUTANEOUS
Status: DISCONTINUED | OUTPATIENT
Start: 2018-06-28 | End: 2018-07-01 | Stop reason: HOSPADM

## 2018-06-28 RX ORDER — NALOXONE HYDROCHLORIDE 0.4 MG/ML
.1-.4 INJECTION, SOLUTION INTRAMUSCULAR; INTRAVENOUS; SUBCUTANEOUS
Status: DISCONTINUED | OUTPATIENT
Start: 2018-06-28 | End: 2018-06-28

## 2018-06-28 RX ORDER — IPRATROPIUM BROMIDE AND ALBUTEROL SULFATE 2.5; .5 MG/3ML; MG/3ML
3 SOLUTION RESPIRATORY (INHALATION) ONCE
Status: COMPLETED | OUTPATIENT
Start: 2018-06-28 | End: 2018-06-28

## 2018-06-28 RX ORDER — METHYLPREDNISOLONE SODIUM SUCCINATE 125 MG/2ML
125 INJECTION, POWDER, LYOPHILIZED, FOR SOLUTION INTRAMUSCULAR; INTRAVENOUS ONCE
Status: COMPLETED | OUTPATIENT
Start: 2018-06-28 | End: 2018-06-28

## 2018-06-28 RX ORDER — ASPIRIN 325 MG
325 TABLET ORAL DAILY
Status: DISCONTINUED | OUTPATIENT
Start: 2018-06-29 | End: 2018-07-01 | Stop reason: HOSPADM

## 2018-06-28 RX ORDER — AMOXICILLIN 250 MG
1 CAPSULE ORAL 2 TIMES DAILY PRN
Status: DISCONTINUED | OUTPATIENT
Start: 2018-06-28 | End: 2018-07-01 | Stop reason: HOSPADM

## 2018-06-28 RX ORDER — AMOXICILLIN 250 MG
2 CAPSULE ORAL 2 TIMES DAILY PRN
Status: DISCONTINUED | OUTPATIENT
Start: 2018-06-28 | End: 2018-07-01 | Stop reason: HOSPADM

## 2018-06-28 RX ORDER — ONDANSETRON 2 MG/ML
4 INJECTION INTRAMUSCULAR; INTRAVENOUS EVERY 6 HOURS PRN
Status: DISCONTINUED | OUTPATIENT
Start: 2018-06-28 | End: 2018-07-01 | Stop reason: HOSPADM

## 2018-06-28 RX ORDER — ACETAMINOPHEN 325 MG/1
650 TABLET ORAL EVERY 4 HOURS PRN
Status: DISCONTINUED | OUTPATIENT
Start: 2018-06-28 | End: 2018-07-01 | Stop reason: HOSPADM

## 2018-06-28 RX ADMIN — IPRATROPIUM BROMIDE AND ALBUTEROL SULFATE 3 ML: .5; 3 SOLUTION RESPIRATORY (INHALATION) at 17:08

## 2018-06-28 RX ADMIN — METHYLPREDNISOLONE SODIUM SUCCINATE 125 MG: 125 INJECTION, POWDER, FOR SOLUTION INTRAMUSCULAR; INTRAVENOUS at 17:11

## 2018-06-28 RX ADMIN — LEVOFLOXACIN 500 MG: 5 INJECTION, SOLUTION INTRAVENOUS at 21:58

## 2018-06-28 RX ADMIN — ENOXAPARIN SODIUM 40 MG: 40 INJECTION SUBCUTANEOUS at 21:58

## 2018-06-28 RX ADMIN — IPRATROPIUM BROMIDE AND ALBUTEROL SULFATE 3 ML: .5; 3 SOLUTION RESPIRATORY (INHALATION) at 23:27

## 2018-06-28 RX ADMIN — SODIUM CHLORIDE 1000 ML: 9 INJECTION, SOLUTION INTRAVENOUS at 17:08

## 2018-06-28 RX ADMIN — METHYLPREDNISOLONE SODIUM SUCCINATE 62.5 MG: 125 INJECTION, POWDER, FOR SOLUTION INTRAMUSCULAR; INTRAVENOUS at 22:01

## 2018-06-28 RX ADMIN — SODIUM CHLORIDE 88 ML: 9 INJECTION, SOLUTION INTRAVENOUS at 18:48

## 2018-06-28 RX ADMIN — IOPAMIDOL 62 ML: 755 INJECTION, SOLUTION INTRAVENOUS at 18:47

## 2018-06-28 ASSESSMENT — ENCOUNTER SYMPTOMS
COUGH: 0
CHILLS: 1
FEVER: 1
SHORTNESS OF BREATH: 1

## 2018-06-28 NOTE — IP AVS SNAPSHOT
MRN:[id number]                      After Visit Summary   6/28/2018    Flavia Luu    MRN: [id number]           Thank you!     Thank you for choosing [facility] for your care. Our goal is always to provide you with excellent care. Hearing back from our patients is one way we can continue to improve our services. Please take a few minutes to complete the written survey that you may receive in the mail after you visit with us. Thank you!        Patient Information     Date Of Birth          1945        Designated Caregiver       Most Recent Value    Caregiver    Will someone help with your care after discharge? yes    Name of designated caregiver cassie Luu     Phone number of caregiver [phone]    Caregiver address [address] [place] [address]      About your hospital stay     You were admitted on:  June 28, 2018 You last received care in the:  [facility] [facility] [address] [facility]    You were discharged on:  July 1, 2018        Reason for your hospital stay       Acute COPD exacerbation                  Who to Call     For medical emergencies, please call 911.  For non-urgent questions about your medical care, please call your primary care provider or clinic, [phone]          Attending Provider     Provider Specialty    Joselo Yousif MD Emergency Medicine    [facility] sm[facility] Internal Medicine       Primary Care Provider Office Phone # Fax #    Filemon Fletcher [phone]      After Care Instructions     Activity       Your activity upon discharge: activity as tolerated            Diet       Follow this diet upon discharge: Orders Placed This Encounter      Combination Diet Regular Diet Adult            Oxygen Adult       [facility] Order 1 liter(s) by nasal cannula continuously with use of portable tank. Expected treatment length is indefinite (99 months).. Test on conserving device as applicable.    Patients who qualify for  home O2 coverage under the CMS guidelines require ABG tests or O2 sat readings obtained closest to, but no earlier than 2 days prior to the discharge, as evidence of the need for home oxygen therapy. Testing must be performed while patient is in the chronic stable state. See notes for O2 sats.    I certify that this patient, Flavia Luu has been under my care and that I, or a nurse practitioner or physician's assistant working with me, had a face-to-face encounter that meets the face-to-face encounter requirements with this patient on 7/1/2018. The patient, Flavia Luu was evaluated or treated in whole, or in part, for the following medical condition, which necessitates the use of the ordered oxygen. Treatment Diagnosis: COPD    Attending Provider: Jl Marquez  Physician signature: See electronic signature associated with these discharge orders  Date of Order: July 1, 2018                  Follow-up Appointments     Follow-up and recommended labs and tests        See your PCP in one week for follow up on COPD as well as lung nodule.                  Your next 10 appointments already scheduled     Jul 06, 2018  1:00 PM CDT   [facility]    [address] [address] [phone]           Bring a current list of meds and any records pertaining to this visit. For Physicals, please bring immunization records and any forms needing to be filled out. Please arrive 10 minutes early to complete paperwork.              Further instructions from your care team       Pt's cell phone,cash, inhaler were sent down to security, in envelope #[id number]  Pt has clothes, wallet, shoes, full bottom and top dentures in room.     Oxygen Provider:  Arranged through [facility], contact number [phone]. If you have any questions or concerns please call the oxygen company  directly.      Pending Results     Date and Time Order Name Status Description    6/28/2018 2044 Blood culture Preliminary     6/28/2018 2044 Blood culture Preliminary             Statement of Approval     Ordered          07/01/18 1004  I have reviewed and agree with all the recommendations and orders detailed in this document.  EFFECTIVE NOW     Approved and electronically signed by:  Jl Marquez MD             Admission Information     Date & Time Provider Department Dept. Phone    6/28/2018 Matt Hirsch MD [place] [address] [facility] [phone]      Your Vitals Were     Blood Pressure Pulse Temperature Respirations Height Weight    115/80 (BP Location: Left arm) 88 98.2  F (36.8  C) (Oral) 20 1.829 m (6') 72.4 kg (159 lb 9.8 oz)    Pulse Oximetry BMI (Body Mass Index)                91% 21.65 kg/m2          Care EveryWhere ID     This is your Care EveryWhere ID. This could be used by other organizations to access your [facility] medical records  [id number]        Equal Access to Services     MALIK CERVANTES AH: Hadii bradley nicole hadasho Socalvinali, waaxda luqadaha, qaybta kaalmada adeegyada, joseph hills . So [facility] [phone].    ATENCIÓN: Si habla español, tiene a rico disposición servicios gratuitos de asistencia lingüística. Llame al [phone].    We comply with applicable federal civil rights laws and [place] laws. We do not discriminate on the basis of race, color, national origin, age, disability, sex, sexual orientation, or gender identity.               Review of your medicines      START taking        Dose / Directions    aspirin 325 MG tablet   Used for:  S/P ablation of atrial flutter        Dose:  325 mg   Start taking on:  7/2/2018   Take 1 tablet (325 mg) by mouth daily   Quantity:  120 tablet   Refills:  3       predniSONE 20 MG tablet   Commonly known as:  DELTASONE   Used for:  COPD exacerbation (H)        Dose:  40 mg   Start taking on:   7/2/2018   Take 2 tablets (40 mg) by mouth daily for 3 days   Quantity:  6 tablet   Refills:  0         CONTINUE these medicines which may have CHANGED, or have new prescriptions. If we are uncertain of the size of tablets/capsules you have at home, strength may be listed as something that might have changed.        Dose / Directions    * albuterol 108 (90 Base) MCG/ACT Inhaler   Commonly known as:  PROAIR HFA   This may have changed:    - Another medication with the same name was added. Make sure you understand how and when to take each.  - Another medication with the same name was changed. Make sure you understand how and when to take each.        Dose:  1-2 puff   Inhale 1-2 puffs into the lungs every 4 hours as needed for shortness of breath / dyspnea   Quantity:  1 Inhaler   Refills:  11       * albuterol (2.5 MG/3ML) 0.083% neb solution   This may have changed:  when to take this   Used for:  COPD exacerbation (H)        Dose:  2.5 mg   Take 1 vial (2.5 mg) by nebulization 4 times daily   Quantity:  360 mL   Refills:  3       * albuterol (2.5 MG/3ML) 0.083% neb solution   This may have changed:  You were already taking a medication with the same name, and this prescription was added. Make sure you understand how and when to take each.   Used for:  COPD exacerbation (H)        Dose:  2.5 mg   Take 1 vial (2.5 mg) by nebulization every 2 hours as needed for shortness of breath / dyspnea   Quantity:  360 mL   Refills:  3       levofloxacin 500 MG tablet   Commonly known as:  LEVAQUIN   Indication:  COPD exacerbation   This may have changed:    - medication strength  - how much to take   Used for:  COPD exacerbation (H)        Dose:  500 mg   Start taking on:  7/2/2018   Take 1 tablet (500 mg) by mouth daily for 3 days   Quantity:  3 tablet   Refills:  0       * Notice:  This list has 3 medication(s) that are the same as other medications prescribed for you. Read the directions carefully, and ask your doctor or  other care provider to review them with you.      CONTINUE these medicines which have NOT CHANGED        Dose / Directions    fluticasone-salmeterol 500-50 MCG/DOSE diskus inhaler   Commonly known as:  ADVAIR        Dose:  1 puff   Inhale 1 puff into the lungs 2 times daily   Quantity:  1 Inhaler   Refills:  11       IBUPROFEN PO        Dose:  200 mg   Take 200 mg by mouth 4 times daily as needed (aches and pain)   Refills:  0       tiotropium 18 MCG capsule   Commonly known as:  SPIRIVA HANDIHALER        Dose:  18 mcg   Inhale 1 capsule (18 mcg) into the lungs daily Inhale contents of one capsule   Quantity:  30 capsule   Refills:  11         STOP taking     ipratropium - albuterol 0.5 mg/2.5 mg/3 mL 0.5-2.5 (3) MG/3ML neb solution   Commonly known as:  DUONEB                Where to get your medicines      These medications were sent to [id number] - [place], [place] - [address]  [address] [address]     Phone:  [phone]     albuterol (2.5 MG/3ML) 0.083% neb solution    albuterol (2.5 MG/3ML) 0.083% neb solution    aspirin 325 MG tablet    levofloxacin 500 MG tablet    predniSONE 20 MG tablet                Protect others around you: Learn how to safely use, store and throw away your medicines at www.disposemymeds.org.        ANTIBIOTIC INSTRUCTION     You've Been Prescribed an Antibiotic - Now What?  Your healthcare team thinks that you or your loved one might have an infection. Some infections can be treated with antibiotics, which are powerful, life-saving drugs. Like all medications, antibiotics have side effects and should only be used when necessary. There are some important things you should know about your antibiotic treatment.      Your healthcare team may run tests before you start taking an antibiotic.    Your team may take samples (e.g., from your blood, urine or other areas) to run tests to look for bacteria. These test can be important to determine if  you need an antibiotic at all and, if you do, which antibiotic will work best.      Within a few days, your healthcare team might change or even stop your antibiotic.    Your team may start you on an antibiotic while they are working to find out what is making you sick.    Your team might change your antibiotic because test results show that a different antibiotic would be better to treat your infection.    In some cases, once your team has more information, they learn that you do not need an antibiotic at all. They may find out that you don't have an infection, or that the antibiotic you're taking won't work against your infection. For example, an infection caused by a virus can't be treated with antibiotics. Staying on an antibiotic when you don't need it is more likely to be harmful than helpful.      You may experience side effects from your antibiotic.    Like all medications, antibiotics have side effects. Some of these can be serious.    Let you healthcare team know if you have any known allergies when you are admitted to the hospital.    One significant side effect of nearly all antibiotics is the risk of severe and sometimes deadly diarrhea caused by Clostridium difficile (C. Difficile). This occurs when a person takes antibiotics because some good germs are destroyed. Antibiotic use allows C. diificile to take over, putting patients at high risk for this serious infection.    As a patient or caregiver, it is important to understand your or your loved one's antibiotic treatment. It is especially important for caregivers to speak up when patients can't speak for themselves. Here are some important questions to ask your healthcare team.    What infection is this antibiotic treating and how do you know I have that infection?    What side effects might occur from this antibiotic?    How long will I need to take this antibiotic?    Is it safe to take this antibiotic with other medications or supplements (e.g.,  vitamins) that I am taking?     Are there any special directions I need to know about taking this antibiotic? For example, should I take it with food?    How will I be monitored to know whether my infection is responding to the antibiotic?    What tests may help to make sure the right antibiotic is prescribed for me?      Information provided by:  www.cdc.gov/getsmart  U.S. Department of Health and Human Services  Centers for disease Control and Prevention  National Center for Emerging and Zoonotic Infectious Diseases  Division of Healthcare Quality Promotion             Medication List: This is a list of all your medications and when to take them. Check marks below indicate your daily home schedule. Keep this list as a reference.      Medications           Morning Afternoon Evening Bedtime As Needed    * albuterol 108 (90 Base) MCG/ACT Inhaler   Commonly known as:  PROAIR HFA   Inhale 1-2 puffs into the lungs every 4 hours as needed for shortness of breath / dyspnea                                   * albuterol (2.5 MG/3ML) 0.083% neb solution   Take 1 vial (2.5 mg) by nebulization 4 times daily         4 times daily (every 6 hours)                       * albuterol (2.5 MG/3ML) 0.083% neb solution   Take 1 vial (2.5 mg) by nebulization every 2 hours as needed for shortness of breath / dyspnea                                   aspirin 325 MG tablet   Take 1 tablet (325 mg) by mouth daily   Start taking on:  7/2/2018   Last time this was given:  325 mg on 7/1/2018  8:25 AM                                   fluticasone-salmeterol 500-50 MCG/DOSE diskus inhaler   Commonly known as:  ADVAIR   Inhale 1 puff into the lungs 2 times daily                                      IBUPROFEN PO   Take 200 mg by mouth 4 times daily as needed (aches and pain)                                   levofloxacin 500 MG tablet   Commonly known as:  LEVAQUIN   Take 1 tablet (500 mg) by mouth daily for 3 days   Start taking on:  7/2/2018    Last time this was given:  500 mg on 7/1/2018  8:25 AM         for 3 days, beginning 7/2/2018                       predniSONE 20 MG tablet   Commonly known as:  DELTASONE   Take 2 tablets (40 mg) by mouth daily for 3 days   Start taking on:  7/2/2018   Last time this was given:  40 mg on 7/1/2018  8:25 AM            For 3 days, beginning 7/2/2018                       tiotropium 18 MCG capsule   Commonly known as:  SPIRIVA HANDIHALER   Inhale 1 capsule (18 mcg) into the lungs daily Inhale contents of one capsule                                   * Notice:  This list has 3 medication(s) that are the same as other medications prescribed for you. Read the directions carefully, and ask your doctor or other care provider to review them with you.

## 2018-06-28 NOTE — ED PROVIDER NOTES
History     Chief Complaint:  Shortness of Breath     HPI   Flavia Luu is a 72 year old male with a history of COPD, emphysema and previous atrial flutter who presents to the emergency department today via EMS for evaluation of shortness of breath. The patient reports yesterday, he took a shower and then started to develop chills and increasing shortness of breath. He decided to go outside but couldn't warm up and decided to put a flannel on. He checked his temperature earlier today and it was 1003. His shortness of breath continues to worsen prompting a visit to his clinic. He was seen there where they performed labs and chest x-ray as noted below. They were concerned about his shortness of breath prompting a call to EMS and his arrival to the emergency department. En route, he was given a Duoneb without relief. He denies cough, chest pain, recent travel, and sick contacts. Of note, the patient is not on oxygen at home.     Labs at clinic today   CBC: WNL (WBC 4.8, HGB 16.0, )  BMP: glucose 112 (H) o/w WNL (creatinine 1.00)     Chest XR 2 views  IMPRESSION: No acute disease.  Report per radiology     Allergies:  No Known Drug Allergies    Medications:    albuterol (PROAIR HFA) 108 (90 BASE) MCG/ACT Inhaler  fluticasone-salmeterol (ADVAIR) 500-50 MCG/DOSE diskus inhaler  ipratropium - albuterol 0.5 mg/2.5 mg/3 mL (DUONEB) 0.5-2.5 (3) MG/3ML neb solution  levofloxacin (LEVAQUIN) 750 MG tablet  tiotropium (SPIRIVA HANDIHALER) 18 MCG capsule    Past Medical History:    Atrial flutter  COPD  Emphysema of lung  PVC  Tobacco use disorder    Past Surgical History:    Vv stripping on left  Cataract IOL, bilateral  Eye surgery  H ablation atrial flutter    Family History:    Cancer  Diabetes    Social History:  The patient was alone.  Smoking Status: Former, quit 2008  Smokeless Tobacco: Never  Alcohol Use: Yes  Marital Status:       Review of Systems   Constitutional: Positive for chills and fever.  "  Respiratory: Positive for shortness of breath. Negative for cough.    Cardiovascular: Negative for chest pain.   All other systems reviewed and are negative.    Physical Exam     Patient Vitals for the past 24 hrs:   BP Temp Temp src Pulse Heart Rate Resp SpO2 Height Weight   06/28/18 2015 - - - - - - 91 % - -   06/28/18 2000 117/67 - - 104 - - 92 % - -   06/28/18 1930 117/74 - - 104 - 24 92 % - -   06/28/18 1915 - - - 108 - - 92 % - -   06/28/18 1902 - - - 104 - - 92 % - -   06/28/18 1901 134/65 - - - - - - - -   06/28/18 1830 125/77 - - 109 - - 94 % - -   06/28/18 1815 126/84 - - 111 - 28 95 % - -   06/28/18 1800 (!) 104/100 - - 105 - - 97 % - -   06/28/18 1745 119/67 - - 104 - - 94 % - -   06/28/18 1730 137/72 - - 111 - - 95 % - -   06/28/18 1715 122/69 - - 109 - 24 98 % - -   06/28/18 1700 137/65 - - 117 - - 96 % - -   06/28/18 1650 147/89 99.9  F (37.7  C) Oral - 116 30 (!) 88 % 1.803 m (5' 11\") 71.7 kg (158 lb)         Physical Exam  GENERAL: well developed, pleasant, speaks in short sentences  HEAD: atraumatic  EYES: pupils reactive, extraocular muscles intact, conjunctivae normal  ENT:  mucus membranes moist  NECK:  trachea midline, normal range of motion  RESPIRATORY: Breath sounds clear to auscultation, Dyspneic   CVS: normal S1/S2, no murmurs, intact distal pulses  ABDOMEN: soft, nontender, nondistention  MUSCULOSKELETAL: no deformities  SKIN: warm and dry, no acute rashes or ulceration  NEURO: GCS 15, cranial nerves intact, alert and oriented x3  PSYCH:  Mood/affect normal    Emergency Department Course   ECG:  Indication: shortness of breath  Completed at 1655.  Read at 1700.   Accelerated junctional rhythm  Right superior axis deviation  Incomplete right bundle branch block  Right ventricular hypertrophy with repolarization abnormality  Septal infarct, age undetermined  Abnormal ECG  Rate 118 bpm. LA interval *. QRS duration 98. QT/QTc 316/442. P-R-T axes *  261  80.    Imaging:  Radiology findings " were communicated with the patient who voiced understanding of the findings.  Chest CT, IV contrast only-PE protocol  IMPRESSION:   1. No visualized pulmonary embolus.  2. No evidence of active pulmonary disease.  3. 0.8 x 0.5 cm pulmonary nodule left lung apex, new since 1/14/2013.  This could represent a small primary pulmonary neoplasm. A follow-up  CT scan is recommended in 3 months for reevaluation.  4. Moderate to marked emphysematous changes in lungs.   Report per radiology     Laboratory:  Laboratory findings were communicated with the patient who voiced understanding of the findings.  D Dimer (Collected 1700): 4.6 (H)  Troponin (Collected 1700): <0.015  Lactic Acid: 1.6  Procalcitonin: 0.62    Interventions:  1708 DuoNeb 3mL Nebulization   1708 NS Bolus 1,000mL IV  1711 solu-medrol 125 mg IV      Emergency Department Course:  Nursing notes and vitals reviewed.  IV was inserted and blood was drawn for laboratory testing, results above.  The patient was sent for a Chest CT, IV contrast only-PE protocol while in the emergency department, results above.   1648: I performed an exam of the patient as documented above.   1948: I spoke with Dr. Hirsch of the hospitalist service regarding patient's presentation, findings, and plan of care.  Findings and plan explained to the Patient who consents to admission. Discussed the patient with Dr. Hirsch, who will admit the patient to a Medical Telemetry bed for further monitoring, evaluation, and treatment.  I personally reviewed the laboratory, imaging, and EKG results with the Patient and answered all related questions prior to admission.    Impression & Plan    Medical Decision Making:  Flavia Luu is a 72 year old male who presents with shortness of breath.  I considered a differential including COPD, pneumonia, bronchitis, PE, malignancy, amongst others.  The patient looks quite dyspneic and is requiring oxygen and he has short sentences.  He was given steroids  and duo nebs.  X-ray and labs from [facility] look to be unremarkable.  D-dimer was obtained which was quite elevated and CT shows findings as above without evidence of pneumonia or PE.  The patient feels slightly better but still dyspneic.  The patient does not require BiPAP, but does require admission.    Diagnosis:    ICD-10-CM    1. COPD exacerbation (H) J44.1 Procalcitonin       Disposition:  Admitted to Medical Telemetry bed with Dr. Torrey Kam Disclosure:  Ricardo GERBER, am serving as a scribe at 4:48 PM on 6/28/2018 to document services personally performed by Joselo Yousif MD based on my observations and the provider's statements to me.     6/28/2018    EMERGENCY DEPARTMENT       Joselo Yousif MD  06/29/18 0684

## 2018-06-28 NOTE — MR AVS SNAPSHOT
After Visit Summary   6/28/2018    Flavia Luu    MRN: [id number]           Patient Information     Date Of Birth          1945        Visit Information        Provider Department      6/28/2018 1:40 PM Filemon Fletcher MD [facility]        Today's Diagnoses     Panlobular emphysema (H)    -  1    SOB (shortness of breath)        Fever, unspecified fever cause           Follow-ups after your visit        Who to contact     If you have questions or need follow up information about today's clinic visit or your schedule please contact [facility] directly at [phone].  Normal or non-critical lab and imaging results will be communicated to you by MyChart, letter or phone within 4 business days after the clinic has received the results. If you do not hear from us within 7 days, please contact the clinic through MyChart or phone. If you have a critical or abnormal lab result, we will notify you by phone as soon as possible.  Submit refill requests through [email or web address] or call your pharmacy and they will forward the refill request to us. Please allow 3 business days for your refill to be completed.          Additional Information About Your Visit        Care EveryWhere ID     This is your Care EveryWhere ID. This could be used by other organizations to access your [place] medical records  [id number]        Your Vitals Were     Pulse Temperature Respirations Pulse Oximetry BMI (Body Mass Index)       118 100.3  F (37.9  C) (Oral) 32 96% 22.28 kg/m2        Blood Pressure from Last 3 Encounters:   06/28/18 122/74   04/27/18 110/84   01/09/18 144/86    Weight from Last 3 Encounters:   06/28/18 158 lb 9.6 oz (71.9 kg)   04/27/18 156 lb 4.8 oz (70.9 kg)   01/09/18 154 lb 11.2 oz (70.2 kg)              We Performed the Following     Basic metabolic panel     CBC with platelets and differential          Today's Medication Changes          These changes are  accurate as of 6/28/18  4:01 PM.  If you have any questions, ask your nurse or doctor.               Start taking these medicines.        Dose/Directions    levofloxacin 750 MG tablet   Commonly known as:  LEVAQUIN   Used for:  SOB (shortness of breath), Fever, unspecified fever cause, Panlobular emphysema (H)   Started by:  Filemon Fletcher MD        Dose:  750 mg   Take 1 tablet (750 mg) by mouth daily   Quantity:  5 tablet   Refills:  0            Where to get your medicines      These medications were sent to [address] [address]     Phone:  [phone]     levofloxacin 750 MG tablet                Primary Care Provider Office Phone # Fax #    Filemon Fletcher [phone]       [address] [place] [address]        Equal Access to Services     [facility] Center[facility]URA : Hadii aad ku hadasho Soomaali, waaxda luqadaha, qaybta kaalmada adeegyada, waxay desireein haymaryjon [place] . So [facility] [phone].    ATENCIÓN: Si habla español, tiene a rico disposición servicios gratuitos de asistencia lingüística. [facility] [phone].    We comply with applicable federal civil rights laws and [place] laws. We do not discriminate on the basis of race, color, national origin, age, disability, sex, sexual orientation, or gender identity.            Thank you!     Thank you for choosing [facility]  for your care. Our goal is always to provide you with excellent care. Hearing back from our patients is one way we can continue to improve our services. Please take a few minutes to complete the written survey that you may receive in the mail after your visit with us. Thank you!             Your Updated Medication List - Protect others around you: Learn how to safely use, store and throw away your medicines at www.disposemymeds.org.          This list is accurate as of 6/28/18  4:01 PM.  Always use  your most recent med list.                   Brand Name Dispense Instructions for use Diagnosis    albuterol 108 (90 Base) MCG/ACT Inhaler    PROAIR HFA    1 Inhaler    Inhale 1-2 puffs into the lungs every 4 hours as needed for shortness of breath / dyspnea    Pulmonary emphysema, unspecified emphysema type (H)       fluticasone-salmeterol 500-50 MCG/DOSE diskus inhaler    ADVAIR    1 Inhaler    Inhale 1 puff into the lungs 2 times daily    Pulmonary emphysema, unspecified emphysema type (H)       IBUPROFEN PO      Take 200 mg by mouth as needed.        ipratropium - albuterol 0.5 mg/2.5 mg/3 mL 0.5-2.5 (3) MG/3ML neb solution    DUONEB    120 vial    Take 1 vial (3 mLs) by nebulization every 4 hours as needed for shortness of breath / dyspnea    Pulmonary emphysema, unspecified emphysema type (H)       levofloxacin 750 MG tablet    LEVAQUIN    5 tablet    Take 1 tablet (750 mg) by mouth daily    SOB (shortness of breath), Fever, unspecified fever cause, Panlobular emphysema (H)       tiotropium 18 MCG capsule    SPIRIVA HANDIHALER    30 capsule    Inhale 1 capsule (18 mcg) into the lungs daily Inhale contents of one capsule    Pulmonary emphysema, unspecified emphysema type (H)

## 2018-06-28 NOTE — PROGRESS NOTES
SUBJECTIVE:   Flavia Luu is a 72 year old male who presents to clinic today for the following health issues:    Patient states that last night after showering he noticed that he was chilled.  Prior to this he was feeling quite well but since that time he was noted significant amount of shortness of breath, continued fevers and chills.  No usual cough or considerable amount of wheezing.  He has been using his nebulizers as he typically does well his inhalers without any significant improvement.  He has underlying severe COPD with in general pretty poor functional capacity.      Symptoms are currently: much worse    Current fatigue or dyspnea with ambulation: worsened from baseline    Shortness of breath: much worse    Increased or change in Cough/Sputum: No    Fever(s): No    Any ER/UC or hospital admissions since your last visit? No     History   Smoking Status     Former Smoker     Packs/day: 1.00     Years: 43.00     Types: Cigarettes     Quit date: 12/8/2008   Smokeless Tobacco     Never Used       Amount of exercise or physical activity: None    Problems taking medications regularly: No    Medication side effects: none    Diet: regular (no restrictions)    Problem list and histories reviewed & adjusted, as indicated.  Additional history: as documented    Labs reviewed in EPIC    Reviewed and updated as needed this visit by clinical staff  Allergies  Meds       Reviewed and updated as needed this visit by Provider         ROS:  Constitutional, HEENT, cardiovascular, pulmonary, gi and gu systems are negative, except as otherwise noted.    OBJECTIVE:                                                    /74  Pulse 118  Temp 100.3  F (37.9  C) (Oral)  Resp (!) 32  Wt 158 lb 9.6 oz (71.9 kg)  SpO2 96%  BMI 22.28 kg/m2  Body mass index is 22.28 kg/(m^2).   O2 sats vary from 60s to lower 90s off oxygen.  Any type of movement or ambulation off oxygen resulted in a significant desaturation.  Any type of  exertion and ambulation results in a significant desaturations  GENERAL APPEARANCE: alert, no distress, fatigued and older than stated age  HENT: ear canals and TM's normal and nose and mouth without ulcers or lesions  NECK: no adenopathy, no asymmetry, masses, or scars and thyroid normal to palpation  RESP: Greatly diminished breath sounds bilaterally.  No appreciable wheezes or crackles are noted  CV: tachycardia  MS: extremities normal- no gross deformities noted    Diagnostic test results:  Results for orders placed or performed in visit on 06/28/18 (from the past 24 hour(s))   CBC with platelets and differential   Result Value Ref Range    WBC 4.8 4.0 - 11.0 10e9/L    RBC Count 4.95 4.4 - 5.9 10e12/L    Hemoglobin 16.0 13.3 - 17.7 g/dL    Hematocrit 48.8 40.0 - 53.0 %    MCV 99 78 - 100 fl    MCH 32.3 26.5 - 33.0 pg    MCHC 32.8 31.5 - 36.5 g/dL    RDW 14.1 10.0 - 15.0 %    Platelet Count 152 150 - 450 10e9/L    Diff Method Automated Method     % Neutrophils 82.2 %    % Lymphocytes 3.8 %    % Monocytes 13.6 %    % Eosinophils 0.0 %    % Basophils 0.4 %    Absolute Neutrophil 3.9 1.6 - 8.3 10e9/L    Absolute Lymphocytes 0.2 (L) 0.8 - 5.3 10e9/L    Absolute Monocytes 0.7 0.0 - 1.3 10e9/L    Absolute Eosinophils 0.0 0.0 - 0.7 10e9/L    Absolute Basophils 0.0 0.0 - 0.2 10e9/L   Basic metabolic panel   Result Value Ref Range    Sodium 133 133 - 144 mmol/L    Potassium 4.9 3.4 - 5.3 mmol/L    Chloride 97 94 - 109 mmol/L    Carbon Dioxide 27 20 - 32 mmol/L    Anion Gap 9 3 - 14 mmol/L    Glucose 112 (H) 70 - 99 mg/dL    Urea Nitrogen 20 7 - 30 mg/dL    Creatinine 1.00 0.66 - 1.25 mg/dL    GFR Estimate 73 >60 mL/min/1.7m2    GFR Estimate If Black 88 >60 mL/min/1.7m2    Calcium 9.2 8.5 - 10.1 mg/dL        ASSESSMENT/PLAN:                                                         Panlobular emphysema (H)  SOB (shortness of breath)  Fever, unspecified fever cause    While I cannot find an exact etiology for the patient's  fever or dyspnea combination the 2 making me think that this is pulmonary in etiology.  Likely try to manage as an outpatient basis with some antibiotics and oxygen support at home but unfortunately he would desat significantly off oxygen  with any minimal exertion that I did not feel we could safely have him drive home to even have oxygen set up there.  With this in mind I think early option to send him to the ER hope that they may have more success in the next several hours of increasing his O2 levels if not keeping him overnight for observation would be the likely scenario with O2 support and initiation of antibiotics.      Filemon Fletcher MD  [facility]

## 2018-06-28 NOTE — IP AVS SNAPSHOT
[place] [address] [facility]    [address] [place] [address]    Phone:  [phone]                                       After Visit Summary   6/28/2018    Flavia Luu    MRN: [id number]           After Visit Summary Signature Page     I have received my discharge instructions, and my questions have been answered. I have discussed any challenges I see with this plan with the nurse or doctor.    ..........................................................................................................................................  Patient/Patient Representative Signature      ..........................................................................................................................................  Patient Representative Print Name and Relationship to Patient    ..................................................               ................................................  Date                                            Time    ..........................................................................................................................................  Reviewed by Signature/Title    ...................................................              ..............................................  Date                                                            Time

## 2018-06-28 NOTE — ED NOTES
Bed: ED14  Expected date:   Expected time:   Means of arrival:   Comments:  516  72 M sob/fever  1631

## 2018-06-29 PROCEDURE — 12000000 ZZH R&B MED SURG/OB

## 2018-06-29 PROCEDURE — 25000125 ZZHC RX 250: Performed by: HOSPITALIST

## 2018-06-29 PROCEDURE — 25000132 ZZH RX MED GY IP 250 OP 250 PS 637: Performed by: INTERNAL MEDICINE

## 2018-06-29 PROCEDURE — 25000128 H RX IP 250 OP 636: Performed by: HOSPITALIST

## 2018-06-29 PROCEDURE — 99233 SBSQ HOSP IP/OBS HIGH 50: CPT | Performed by: INTERNAL MEDICINE

## 2018-06-29 PROCEDURE — 25000132 ZZH RX MED GY IP 250 OP 250 PS 637: Performed by: HOSPITALIST

## 2018-06-29 PROCEDURE — 94640 AIRWAY INHALATION TREATMENT: CPT

## 2018-06-29 PROCEDURE — 94640 AIRWAY INHALATION TREATMENT: CPT | Mod: 76

## 2018-06-29 PROCEDURE — 40000275 ZZH STATISTIC RCP TIME EA 10 MIN

## 2018-06-29 RX ORDER — LEVOFLOXACIN 500 MG/1
500 TABLET, FILM COATED ORAL DAILY
Status: DISCONTINUED | OUTPATIENT
Start: 2018-06-29 | End: 2018-07-01 | Stop reason: HOSPADM

## 2018-06-29 RX ORDER — LIDOCAINE 40 MG/G
CREAM TOPICAL
Status: DISCONTINUED | OUTPATIENT
Start: 2018-06-29 | End: 2018-07-01 | Stop reason: HOSPADM

## 2018-06-29 RX ADMIN — METHYLPREDNISOLONE SODIUM SUCCINATE 62.5 MG: 125 INJECTION, POWDER, FOR SOLUTION INTRAMUSCULAR; INTRAVENOUS at 23:04

## 2018-06-29 RX ADMIN — IPRATROPIUM BROMIDE AND ALBUTEROL SULFATE 3 ML: .5; 3 SOLUTION RESPIRATORY (INHALATION) at 15:08

## 2018-06-29 RX ADMIN — IPRATROPIUM BROMIDE AND ALBUTEROL SULFATE 3 ML: .5; 3 SOLUTION RESPIRATORY (INHALATION) at 12:26

## 2018-06-29 RX ADMIN — METHYLPREDNISOLONE SODIUM SUCCINATE 62.5 MG: 125 INJECTION, POWDER, FOR SOLUTION INTRAMUSCULAR; INTRAVENOUS at 04:36

## 2018-06-29 RX ADMIN — IPRATROPIUM BROMIDE AND ALBUTEROL SULFATE 3 ML: .5; 3 SOLUTION RESPIRATORY (INHALATION) at 23:40

## 2018-06-29 RX ADMIN — METHYLPREDNISOLONE SODIUM SUCCINATE 62.5 MG: 125 INJECTION, POWDER, FOR SOLUTION INTRAMUSCULAR; INTRAVENOUS at 11:05

## 2018-06-29 RX ADMIN — UMECLIDINIUM 1 PUFF: 62.5 AEROSOL, POWDER ORAL at 08:53

## 2018-06-29 RX ADMIN — ASPIRIN 325 MG ORAL TABLET 325 MG: 325 PILL ORAL at 08:53

## 2018-06-29 RX ADMIN — LEVOFLOXACIN 500 MG: 500 TABLET, FILM COATED ORAL at 10:24

## 2018-06-29 RX ADMIN — IPRATROPIUM BROMIDE AND ALBUTEROL SULFATE 3 ML: .5; 3 SOLUTION RESPIRATORY (INHALATION) at 07:26

## 2018-06-29 RX ADMIN — FLUTICASONE FUROATE AND VILANTEROL TRIFENATATE 1 PUFF: 200; 25 POWDER RESPIRATORY (INHALATION) at 08:53

## 2018-06-29 RX ADMIN — ENOXAPARIN SODIUM 40 MG: 40 INJECTION SUBCUTANEOUS at 20:42

## 2018-06-29 RX ADMIN — IPRATROPIUM BROMIDE AND ALBUTEROL SULFATE 3 ML: .5; 3 SOLUTION RESPIRATORY (INHALATION) at 19:16

## 2018-06-29 RX ADMIN — METHYLPREDNISOLONE SODIUM SUCCINATE 62.5 MG: 125 INJECTION, POWDER, FOR SOLUTION INTRAMUSCULAR; INTRAVENOUS at 16:58

## 2018-06-29 NOTE — PLAN OF CARE
Problem: Patient Care Overview  Goal: Plan of Care/Patient Progress Review  Outcome: No Change  A&O x 4. VSS on 1L O2 via nasal canula. Inspiratory and expiratory wheezes to lung fields. HENRIQUEZ. Tele NSR. Denied pain. IV SL. Up with stand by assist. Used urinal. Slept well. Continues on IV Solu-Medrol q6h. Will continue to monitor.

## 2018-06-29 NOTE — PROGRESS NOTES
[facility]  Hospitalist Progress Note  Jl Marquez MD    Assessment & Plan   Mr. Flavia Luu is a 72-year-old male with PMHx significant for non-oxygen dependent COPD and atrial flutter ablation (01/2013) who presented with increasing shortness of breath for one day. He was admitted for further management.     Acute COPD exacerbation with acute hypoxic respiratory failure:  Improving  [PTA: albuterol neb daily, albuterol MDI 1-2 puffs every 4 hrs prn, Advair 500-50 bid, DuoNeb prn, Spiriva daily]  Presented with increasing dyspnea for one day. Hypoxic with O2 sat of 88% on admission. CBC with normal WBC of 4.8 and Hgb of 16. Lactate 1.6. Procalcitonin 0.62. CXR negative for acute cardiopulmonary disease. D dimer elevated at 4.6, but CT chest negative for PE or active pulmonary diease. It showed 0.8 x 0.5 cm pulmonary nodule left lung apex, new since 1/14/2013 and moderate to marked emphysematous changes in the lungs. Started on IV Solu-Medrol, Levaquin, and aggressive neb therapy upon admission. Blood cultures negative to date.    -Cont DuoNeb q. 4 hours scheduled and albuterol nebs q. 2 hours prn  -Continue Incruse Ellipta  -Continue Levaquin empirically  -Change IV Solu-Medrol to po prednisone in one day  -Continue O2 supplement, wean as able (currently on 1 L/min)    Lung nodule (left lung apex):    Incidentally noted a 0.8 x 0.5 cm pulmonary nodule left lung apex, new since 01/14/2013.   -Follow up CT within 3 months. Discussed with the patient.    Atrial flutter s/p ablation in 01/2013:   EKG in ED showed accelerated junctional rhythm versus atrial flutter. Now in normal sinus rhythm.  -CHADS2-VASc score is 1. Started on aspirin.    -Continue telemetry  -Follow up with PCP     # Pain Assessment:  Current Pain Score 6/29/2018   Patient currently in pain? denies   Pain score (0-10) -   Flavia robison pain level was assessed and he currently denies pain.        Active Diet Order      Combination  Diet Regular Diet Adult    DVT prophylaxis: Lovenox.     CODE STATUS:  DNR/DNI.   Disposition: Anticipate 2-3 more days of inpatient management.    D/W RN.    Time Spent on this Encounter   I spent 35 minutes on the unit/floor managing the care of Flavia Luu. Over 50% of my time was spent counseling the patient and/or coordinating care regarding services listed in this note.    Interval History   Patient continues to feel dyspneic but slightly better. He reports no fever, sputum, cp, palpitation, wheezing. No new issue overnight.    Data reviewed today: I reviewed all new labs and imaging over the last 24 hours. I personally reviewed no images or EKG's today.    Physical Exam   Temp: 98.5  F (36.9  C) Temp src: Oral BP: 131/76 Pulse: 101 Heart Rate: 80 Resp: 18 SpO2: 94 % O2 Device: Nasal cannula Oxygen Delivery: 1 LPM  Vitals:    06/28/18 1650 06/28/18 2037   Weight: 71.7 kg (158 lb) 72.4 kg (159 lb 9.8 oz)     Vital Signs with Ranges  Temp:  [98.5  F (36.9  C)-100.3  F (37.9  C)] 98.5  F (36.9  C)  Pulse:  [101-118] 101  Heart Rate:  [] 80  Resp:  [18-32] 18  BP: (104-147)/() 131/76  SpO2:  [88 %-98 %] 94 %  I/O's Last 24 hours  I/O last 3 completed shifts:  In: 340 [P.O.:240; I.V.:100]  Out: 350 [Urine:350]    Constitutional: Weak appearing  HEENT: No conjunctival pallor.  Neurologic: Awake, alert, fully oriented  Neck: Supple, no elevated JVP  Respiratory: Decreased breath sounds diffusely with poor air movement  Cardiovascular: Normal S1 and S2. Regular rhythm and rate  GI: Abdomen soft, non tender, non distended  Extremities: No calf tenderness, no edema  Skin/integument: No acute rash, no cyanosis    Medications   All medications were reviewed.      aspirin  325 mg Oral Daily     enoxaparin  40 mg Subcutaneous Q24H     fluticasone-vilanterol  1 puff Inhalation Daily     ipratropium - albuterol 0.5 mg/2.5 mg/3 mL  3 mL Nebulization Q4H While awake     levofloxacin  500 mg Intravenous Q24H      methylPREDNISolone  62.5 mg Intravenous Q6H     sodium chloride (PF)  3 mL Intracatheter Q8H     umeclidinium  1 puff Inhalation Daily        Data     Recent Labs  Lab 06/28/18  2140 06/28/18  1700 06/28/18  1426   WBC  --   --  4.8   HGB  --   --  16.0   MCV  --   --  99   *  --  152   NA  --   --  133   POTASSIUM  --   --  4.9   CHLORIDE  --   --  97   CO2  --   --  27   BUN  --   --  20   CR 0.88  --  1.00   ANIONGAP  --   --  9   SAMIRA  --   --  9.2   GLC  --   --  112*   TROPI  --  <0.015 Canceled, Test credited       Recent Results (from the past 24 hour(s))   XR Chest 2 Views    Narrative    CHEST TWO VIEWS 6/28/2018 3:08 PM     HISTORY: Fever, shortness of breath.    COMPARISON: 1/9/2018     FINDINGS: Hyperexpansion and some interstitial changes appear similar  to previous. No pneumothorax. There are no acute infiltrates. The  cardiac silhouette is not enlarged. Pulmonary vasculature is  unremarkable.      Impression    IMPRESSION: No acute disease.    ALEXANDRO HAMMONDS MD   Chest CT, IV contrast only - PE protocol    Narrative    CT CHEST WITH CONTRAST   6/28/2018 6:49 PM     HISTORY: Shortness of breath. Elevated D-dimer.    COMPARISON: 1/14/2013.    TECHNIQUE: Following the uneventful administration of 62mL Isovue-370  intravenous contrast, helical sections were acquired through the lungs  according to the pulmonary embolism protocol. Coronal reconstructions  were generated. Radiation dose for this scan was reduced using  automated exposure control, adjustment of the mA and/or kV according  to the patient's size, or iterative reconstruction technique.    FINDINGS: No visualized pulmonary embolus. The thoracic aorta is  normal in caliber without dissection. Coronary artery calcification.    Moderate to marked emphysematous changes in the lungs. A few linear  opacities scattered in the periphery of the lungs likely represent  scarring or atelectasis. 0.8 x 0.5 cm nodular opacity in the left lung  apex  (series 6 image 26), new since 1/14/2013. The lungs are otherwise  clear. No pleural or pericardial effusion. No enlarged lymph nodes in  the chest.    Scan through the upper abdomen is significant for a 0.4 cm  nonobstructing calculus in the inferior pole of the right kidney.      Impression    IMPRESSION:   1. No visualized pulmonary embolus.  2. No evidence of active pulmonary disease.  3. 0.8 x 0.5 cm pulmonary nodule left lung apex, new since 1/14/2013.  This could represent a small primary pulmonary neoplasm. A follow-up  CT scan is recommended in 3 months for reevaluation.  4. Moderate to marked emphysematous changes in the lungs.     MERCEDES FIGUEROA MD

## 2018-06-29 NOTE — PLAN OF CARE
Problem: Patient Care Overview  Goal: Plan of Care/Patient Progress Review  Outcome: Improving  VSS, O2 93-95% on 1L NC, desat to 85-88% with acitivity. (not O2 dependent at baseline). HENRIQUEZ, with accessory muscle use. LS Dim. A&Ox4. SBA calls appropriately. Tolerating reg diet, good appetite. On Scheduled nebs and IV Solu Medrol. IV Levaquin switched to oral. IVSL. Denies pain. Tele NSR. Possible discharge in 2-3 days. RN will cont to monitor.     [id number]: O2 low 90s on 1L NC. HENRIQUEZ. Up independently in room, steady and calls appropriately when needed. Denies CP.

## 2018-06-29 NOTE — ED NOTES
[facility]  ED Nurse Handoff Report    ED Chief complaint: Shortness of Breath (pt increasingly short of breath today, history of COPD, felt chills yesterday, temp 100.3)      ED Diagnosis:   Final diagnoses:   COPD exacerbation (H)       Code Status: to be addressed upon admission    Allergies: No Known Allergies    Activity level - Baseline/Home:  Independent    Activity Level - Current:   Stand with Assist     Needed?: No    Isolation: No  Infection: Not Applicable  Bariatric?: No    Vital Signs:   Vitals:    06/28/18 1901 06/28/18 1902 06/28/18 1915 06/28/18 1930   BP: 134/65   117/74   Pulse:  104 108 104   Resp:    24   Temp:       TempSrc:       SpO2:  92% 92% 92%   Weight:       Height:           Cardiac Rhythm: ,        Pain level: 0-10 Pain Scale: 0    Is this patient confused?: No   [place]ers - Suicide Severity Rating Scale Completed?  Yes  If yes, what color did the patient score?  Yellow    Patient Report: Initial Complaint: Shortness of breath, fever  Focused Assessment: Pt has history of COPD. Showered yesterday and felt chills after and unable to warm himself up. Tonight felt increasingly short of breath. Went to clinic that sent him to ED via EMS. Pt very dyspneic upon arrival with retractions noted and tachypnea. Lung sounds very diminished. Pt improved some with rest and neb and solumedrol. Does not wear oxygen at home. Was 88% on room air upon arrival, up to 95% with 2L so decreased to 1L due to COPD and has been 91-92%. Denies any chest pain or other c/o.  Tests Performed: Labs, Chest CT, Chest xray done at clinic and some labs done at clinic  Abnormal Results:   Results for orders placed or performed during the hospital encounter of 06/28/18   Chest CT, IV contrast only - PE protocol    Narrative    CT CHEST WITH CONTRAST   6/28/2018 6:49 PM     HISTORY: Shortness of breath. Elevated D-dimer.    COMPARISON: 1/14/2013.    TECHNIQUE: Following the uneventful  administration of 62mL Isovue-370  intravenous contrast, helical sections were acquired through the lungs  according to the pulmonary embolism protocol. Coronal reconstructions  were generated. Radiation dose for this scan was reduced using  automated exposure control, adjustment of the mA and/or kV according  to the patient's size, or iterative reconstruction technique.    FINDINGS: No visualized pulmonary embolus. The thoracic aorta is  normal in caliber without dissection. Coronary artery calcification.    Moderate to marked emphysematous changes in the lungs. A few linear  opacities scattered in the periphery of lungs likely represent  scarring or atelectasis. 0.8 x 0.5 cm nodular opacity in the left lung  apex (series 6 image 26), new since 1/14/2013. The lungs are otherwise  clear. No pleural or pericardial effusion. No enlarged lymph nodes in  the chest.    Scan through the upper abdomen is significant for a 0.4 cm  nonobstructing calculus in the inferior pole of the right kidney.      Impression    IMPRESSION:   1. No visualized pulmonary embolus.  2. No evidence of active pulmonary disease.  3. 0.8 x 0.5 cm pulmonary nodule left lung apex, new since 1/14/2013.  This could represent a small primary pulmonary neoplasm. A follow-up  CT scan is recommended in 3 months for reevaluation.  4. Moderate to marked emphysematous changes in lungs.    D dimer quantitative   Result Value Ref Range    D Dimer 4.6 (H) 0.0 - 0.50 ug/ml FEU   Troponin I   Result Value Ref Range    Troponin I ES <0.015 0.000 - 0.045 ug/L   Lactate for Sepsis Protocol   Result Value Ref Range    Lactate for Sepsis Protocol 1.6 0.4 - 1.9 mmol/L   EKG 12 lead   Result Value Ref Range    Interpretation ECG Click View Image link to view waveform and result        Treatments provided: Duoneb, 1L NS bolus, Solumedrol 125mg IVP    Family Comments: wife at home, daughter also in area    OBS brochure/video discussed/provided to patient: N/A    ED  Medications:   Medications   methylPREDNISolone sodium succinate (solu-MEDROL) injection 125 mg (125 mg Intravenous Given 6/28/18 1711)   ipratropium - albuterol 0.5 mg/2.5 mg/3 mL (DUONEB) neb solution 3 mL (3 mLs Nebulization Given 6/28/18 1708)   0.9% sodium chloride BOLUS (0 mLs Intravenous Stopped 6/28/18 1800)   iopamidol (ISOVUE-370) solution 62 mL (62 mLs Intravenous Given 6/28/18 1847)   Saline (88 mLs Intravenous Given 6/28/18 1848)       Drips infusing?:  No    For the majority of the shift this patient was Green.   Interventions performed were none.    Severe Sepsis OR Septic Shock Diagnosis Present: No      ED NURSE PHONE NUMBER: [phone]

## 2018-06-29 NOTE — PHARMACY-ADMISSION MEDICATION HISTORY
Admission medication history interview status for the 6/28/2018  admission is complete. See EPIC admission navigator for prior to admission medications     Medication history source reliability:Good    Actions taken by pharmacist (provider contacted, etc): interviewed pt.     Additional medication history information not noted on PTA med list :    --  Pt doesn't take ibuprofen everyday but it is his go to medication for aches and pain.  He likes to have it on his med list.  --  Levaquin is a brand new medication that was prescribed for him from clinic today.  He is unaware of the name but knows he is supposed to start on an antibiotic.      Medication reconciliation/reorder completed by provider prior to medication history? No    Time spent in this activity: 10 minutes    Prior to Admission medications    Medication Sig Last Dose Taking? Auth Provider   albuterol (2.5 MG/3ML) 0.083% neb solution Take 2.5 mg by nebulization daily 6/28/2018 at am Yes Unknown, Entered By History   albuterol (PROAIR HFA) 108 (90 BASE) MCG/ACT Inhaler Inhale 1-2 puffs into the lungs every 4 hours as needed for shortness of breath / dyspnea prn Yes Filemon Fletcher MD   fluticasone-salmeterol (ADVAIR) 500-50 MCG/DOSE diskus inhaler Inhale 1 puff into the lungs 2 times daily 6/28/2018 at am Yes Filemon Fletcher MD   IBUPROFEN PO Take 200 mg by mouth 4 times daily as needed (aches and pain)  prn Yes Unknown, Entered By History   ipratropium - albuterol 0.5 mg/2.5 mg/3 mL (DUONEB) 0.5-2.5 (3) MG/3ML neb solution Take 1 vial (3 mLs) by nebulization every 4 hours as needed for shortness of breath / dyspnea prn Yes Filemon Fletcher MD   tiotropium (SPIRIVA HANDIHALER) 18 MCG capsule Inhale 1 capsule (18 mcg) into the lungs daily Inhale contents of one capsule 6/27/2018 at 14:30 Yes Filemon Fletcher MD   levofloxacin (LEVAQUIN) 750 MG tablet Take 1 tablet (750 mg) by mouth daily NEW - has not started  Filemon Fletcher MD

## 2018-06-29 NOTE — H&P
Admitted:     06/28/2018      PRIMARY CARE PHYSICIAN:  Filemon Fletcher MD      CHIEF COMPLAINT:  Shortness of breath.      HISTORY OF PRESENT ILLNESS:  Mr. Flavia Luu is a 72-year-old male with COPD, history of atrial flutter ablation who presented to the ER today with complaints of shortness of breath.  He was sent from his PCP clinic.  He has had COPD exacerbations in the past, but is not on any home oxygen or steroid.  For the past 2 days he has been feeling shortness of breath along with chills.  Denies any fever or cough.  No chest pain, denies pain in the abdomen.  Reports a normal bowel and bladder habit.  His shortness of breath has gotten progressively worse since yesterday.  Today in the clinic, he was noted to be hypoxic and was sent to ER for further evaluation.  Here, he was seen by Dr. Yousif.  He was given DuoNeb, Solu-Medrol and despite that, he has had minimal symptomatic relief so hospitalist has requested admission for further evaluation.      REVIEW OF SYSTEMS:  A 10-point review of system was done and was negative apart from those mentioned in the history of present illness.      PAST MEDICAL HISTORY:   1.  COPD.   2.  Atrial flutter, status post ablation.   3.  Past smoker.      MEDICATIONS PRIOR TO ADMISSION:  Prescriptions Prior to Admission   Medication Sig Dispense Refill Last Dose     albuterol (2.5 MG/3ML) 0.083% neb solution Take 2.5 mg by nebulization daily   6/28/2018 at am     albuterol (PROAIR HFA) 108 (90 BASE) MCG/ACT Inhaler Inhale 1-2 puffs into the lungs every 4 hours as needed for shortness of breath / dyspnea 1 Inhaler 11 prn     fluticasone-salmeterol (ADVAIR) 500-50 MCG/DOSE diskus inhaler Inhale 1 puff into the lungs 2 times daily 1 Inhaler 11 6/28/2018 at am     IBUPROFEN PO Take 200 mg by mouth 4 times daily as needed (aches and pain)    prn     ipratropium - albuterol 0.5 mg/2.5 mg/3 mL (DUONEB) 0.5-2.5 (3) MG/3ML neb solution Take 1 vial (3 mLs) by nebulization  "every 4 hours as needed for shortness of breath / dyspnea 120 vial 11 prn     tiotropium (SPIRIVA HANDIHALER) 18 MCG capsule Inhale 1 capsule (18 mcg) into the lungs daily Inhale contents of one capsule 30 capsule 11 6/27/2018 at 14:30     levofloxacin (LEVAQUIN) 750 MG tablet Take 1 tablet (750 mg) by mouth daily 5 tablet 0 NEW - has not started         ALLERGIES:  NO KNOWN DRUG ALLERGIES.      SOCIAL HISTORY:  He quit smoking 13-14 years ago.  He states prior to that he used to smoke \"plenty.\" He denies alcohol use, no illicit drug use.      FAMILY HISTORY:  Reviewed and not pertinent to current presentation.      PHYSICAL EXAMINATION:   GENERAL:  The patient is conscious, alert, oriented x 3, sitting in bed and seems to be in moderate respiratory distress.  Still speaking in full sentences, but with small pauses.   VITAL SIGNS:  Temperature 100.3, heart rate of 104, blood pressure 117/67, saturation 88% on room air, 92% on 2 liters.   HEENT:  Pupils are equal and reactive to light and accommodation.  Extraocular movements are intact.  Oral mucosa is moist.   NECK:  Supple.  No JVD.   RESPIRATORY:  Diminished breath sounds bilaterally.  Decreased diminished air entry with expiratory wheezes present.  No crepitations.   CARDIOVASCULAR:  Normal S1-S2, irregularly irregular rhythm, no murmur.   ABDOMEN:  Soft, nontender, nondistended, no guarding, rigidity or rebound tenderness.   LOWER EXTREMITIES:  With no edema.   NEUROLOGIC:  No focal neurological deficits noted.  Cranial nerves II-XII grossly intact.   PSYCHIATRIC:  Normal mood and affect.      LABORATORY DATA:  CBC, BMP reviewed in Epic are mostly unremarkable including a normal WBC count of 4.8.  Lactate normal at 1.6.  Negative troponin.  D-dimer was elevated at 4.6.        A CT chest, PE protocol reviewed, shows no PE or active pulmonary disease, but does note 0.8 x 0.5 cm pulmonary nodule, left lung apex, and moderate-to-marked emphysematous changes in the " lung.        EKG reviewed by me shows atrial fibrillation with controlled ventricular rate.      ASSESSMENT AND PLAN:  Mr. Kamala Luu is a 72-year-old male with chronic obstructive pulmonary disease, history of atrial flutter ablation who presented to the emergency room today with complaints of shortness of breath.     1.  Acute hypoxic respiratory failure secondary to COPD exacerbation:  We will admit him as an inpatient.  We will have DuoNeb q. 4 hours scheduled and albuterol nebs q. 2 hours p.r.n., Solu-Medrol 60 mg q. 6 hours.  We will continue with oxygen, aiming for saturation more than 90% and try to wean down.  Given his low-grade temperature, we will also start him on Levaquin.     2.  Lung nodule:  CT chest noted with 0.8 x 0.5 cm pulmonary nodule left lung.  Given his past smoking history, he is at high risk.  Radiology recommends 3-months' followup     3.  Atrial fibrillation/flutter with history of ablation:  His CHADS2-VASc score is 1, so we will start him on aspirin.  Pretty soon he will be 73 and will be scoring at 2, necessitating oral anticoagulation.  We will hold off on that for now as he will need followup of his lung nodule and might even need biopsy.  We will defer anticoagulation to PCP followup.  He is scheduled to see his PCP in September.     4.  DVT prophylaxis with Lovenox.      CODE STATUS:  This was discussed and he wishes to be DNR/DNI.         RAFIA SUNG MD             D: 2018   T: 2018   MT: RICHY      Name:     KAMALA LUU   MRN:      [id number]        Account:      [id number]   :      1945        Admitted:     2018                   Document: [id number]       cc: Filemon Fletcher MD

## 2018-06-29 NOTE — PROGRESS NOTES
RECEIVING UNIT ED HANDOFF REVIEW    ED Nurse Handoff Report was reviewed by: Charlie Soto on June 28, 2018 at 8:09 PM     Chart reviewed, ok to send pt. Thanks.

## 2018-06-29 NOTE — PLAN OF CARE
Problem: Patient Care Overview  Goal: Plan of Care/Patient Progress Review  Outcome: No Change  Admission    Patient arrives to room 607 via cart from ED.  Care plan note: vss, alert x4, denied pain. +BS, lungs very diminished, on 1LPM NC. RT to assess and treat. PIV patent and SL. SBA x1. HENRIQUEZ. Fall risk. Pt calls appropriately. Skin intact, except for bruising. Ind. Positioning. Con. Of B&B. Tele: ST. Monitor.     Inpatient nursing criteria listed below were met:    PCD's Documented: NA  Skin issues/needs documented :NA  Isolation education started/completed NA  Patient allergies verified with patient: NA  Verified completion of [place] Risk Assessment Tool:  Yes  Verified completion of Guardianship screening tool: No, pt able to make own decision.   Fall Prevention: Care plan updated, Education given and documented Yes  Care Plan initiated: Yes  Home medications documented in belongings flowsheet: Yes  Patient belongings documented in belongings flowsheet: Yes  Reminder note (belongings/ medications) placed in discharge instructions:Yes  Admission profile/ required documentation complete: Yes

## 2018-06-30 PROCEDURE — 12000000 ZZH R&B MED SURG/OB

## 2018-06-30 PROCEDURE — 94640 AIRWAY INHALATION TREATMENT: CPT | Mod: 76

## 2018-06-30 PROCEDURE — 94640 AIRWAY INHALATION TREATMENT: CPT

## 2018-06-30 PROCEDURE — 25000132 ZZH RX MED GY IP 250 OP 250 PS 637: Performed by: HOSPITALIST

## 2018-06-30 PROCEDURE — 40000275 ZZH STATISTIC RCP TIME EA 10 MIN

## 2018-06-30 PROCEDURE — 25000125 ZZHC RX 250: Performed by: INTERNAL MEDICINE

## 2018-06-30 PROCEDURE — 25000132 ZZH RX MED GY IP 250 OP 250 PS 637: Performed by: INTERNAL MEDICINE

## 2018-06-30 PROCEDURE — 25000128 H RX IP 250 OP 636: Performed by: HOSPITALIST

## 2018-06-30 PROCEDURE — 99232 SBSQ HOSP IP/OBS MODERATE 35: CPT | Performed by: INTERNAL MEDICINE

## 2018-06-30 PROCEDURE — 25000125 ZZHC RX 250: Performed by: HOSPITALIST

## 2018-06-30 RX ORDER — PREDNISONE 20 MG/1
40 TABLET ORAL DAILY
Status: DISCONTINUED | OUTPATIENT
Start: 2018-06-30 | End: 2018-07-01 | Stop reason: HOSPADM

## 2018-06-30 RX ADMIN — IPRATROPIUM BROMIDE AND ALBUTEROL SULFATE 3 ML: .5; 3 SOLUTION RESPIRATORY (INHALATION) at 20:27

## 2018-06-30 RX ADMIN — IPRATROPIUM BROMIDE AND ALBUTEROL SULFATE 3 ML: .5; 3 SOLUTION RESPIRATORY (INHALATION) at 11:37

## 2018-06-30 RX ADMIN — ENOXAPARIN SODIUM 40 MG: 40 INJECTION SUBCUTANEOUS at 21:34

## 2018-06-30 RX ADMIN — FLUTICASONE FUROATE AND VILANTEROL TRIFENATATE 1 PUFF: 200; 25 POWDER RESPIRATORY (INHALATION) at 08:24

## 2018-06-30 RX ADMIN — ASPIRIN 325 MG ORAL TABLET 325 MG: 325 PILL ORAL at 08:23

## 2018-06-30 RX ADMIN — IPRATROPIUM BROMIDE AND ALBUTEROL SULFATE 3 ML: .5; 3 SOLUTION RESPIRATORY (INHALATION) at 07:25

## 2018-06-30 RX ADMIN — LEVOFLOXACIN 500 MG: 500 TABLET, FILM COATED ORAL at 08:23

## 2018-06-30 RX ADMIN — METHYLPREDNISOLONE SODIUM SUCCINATE 62.5 MG: 125 INJECTION, POWDER, FOR SOLUTION INTRAMUSCULAR; INTRAVENOUS at 05:11

## 2018-06-30 RX ADMIN — IPRATROPIUM BROMIDE AND ALBUTEROL SULFATE 3 ML: .5; 3 SOLUTION RESPIRATORY (INHALATION) at 23:15

## 2018-06-30 RX ADMIN — IPRATROPIUM BROMIDE AND ALBUTEROL SULFATE 3 ML: .5; 3 SOLUTION RESPIRATORY (INHALATION) at 15:30

## 2018-06-30 RX ADMIN — PREDNISONE 40 MG: 20 TABLET ORAL at 11:10

## 2018-06-30 RX ADMIN — UMECLIDINIUM 1 PUFF: 62.5 AEROSOL, POWDER ORAL at 08:24

## 2018-06-30 NOTE — PLAN OF CARE
Problem: Patient Care Overview  Goal: Plan of Care/Patient Progress Review  Outcome: No Change  A/Ox4, VSS on 1L, denies pain.  unable to wean to r/a, 89% on r/a, 93% on 1L.  IS given, performing 3-5 repetitions, as tolerated at 1500 volume. Up SBA to bathroom.  PO steroids given.  Scheduled nebs, LS diminished.  Plan is likely d/c tomorrow.

## 2018-06-30 NOTE — PROGRESS NOTES
[facility]  Hospitalist Progress Note  Jl Marquez MD    Assessment & Plan   Mr. Flavia Luu is a 72-year-old male with PMHx significant for non-oxygen dependent COPD and atrial flutter ablation (01/2013) who presented with increasing shortness of breath for one day. He was admitted for further management.     Acute COPD exacerbation with acute hypoxic respiratory failure:  Improving  [PTA: albuterol neb daily, albuterol MDI 1-2 puffs every 4 hrs prn, Advair 500-50 bid, DuoNeb prn, Spiriva daily]  Presented with increasing dyspnea for one day. Hypoxic with O2 sat of 88% on admission. CBC with normal WBC of 4.8 and Hgb of 16. Lactate 1.6. Procalcitonin 0.62. CXR negative for acute cardiopulmonary disease. D dimer elevated at 4.6, but CT chest negative for PE or active pulmonary diease. It showed 0.8 x 0.5 cm pulmonary nodule left lung apex, new since 1/14/2013 and moderate to marked emphysematous changes in the lungs. Started on IV Solu-Medrol, Levaquin, and aggressive neb therapy upon admission. Blood cultures negative to date.    -Cont DuoNeb q. 4 hours scheduled and albuterol nebs q. 2 hours prn  -Continue Incruse Ellipta  -Continue Levaquin empirically  -Changed IV Solu-Medrol to po prednisone 06/30. Continue for 4 more days.  -Continue O2 supplement, wean as able (currently on 1 L/min)    Lung nodule (left lung apex):    Incidentally noted a 0.8 x 0.5 cm pulmonary nodule left lung apex, new since 01/14/2013.   -Follow up CT within 3 months. Discussed with the patient.    Atrial flutter s/p ablation in 01/2013:   EKG in ED showed accelerated junctional rhythm versus atrial flutter. Now in normal sinus rhythm.  -CHADS2-VASc score is 1. Started on aspirin.    -Continue telemetry  -Follow up with PCP     # Pain Assessment:  Current Pain Score 6/30/2018   Patient currently in pain? denies   Pain score (0-10) -   Flavia robison pain level was assessed and he currently denies pain.        Active Diet  Order      Combination Diet Regular Diet Adult    DVT prophylaxis: Lovenox.     CODE STATUS:  DNR/DNI.   Disposition: Anticipate 1-2 more days of inpatient management.    D/W RN.    Interval History   Patient overall feels better and less dyspneic. He reports no fever, sputum, cp, palpitation, wheezing. No new issue overnight.    Data reviewed today: I reviewed all new labs and imaging over the last 24 hours. I personally reviewed no images or EKG's today.    Physical Exam   Temp: 98.1  F (36.7  C) Temp src: Oral BP: 123/79 Pulse: 101 Heart Rate: 92 Resp: 20 SpO2: 94 % O2 Device: Nasal cannula Oxygen Delivery: 1 LPM  Vitals:    06/28/18 1650 06/28/18 2037   Weight: 71.7 kg (158 lb) 72.4 kg (159 lb 9.8 oz)     Vital Signs with Ranges  Temp:  [98.1  F (36.7  C)-99  F (37.2  C)] 98.1  F (36.7  C)  Pulse:  [101] 101  Heart Rate:  [87-94] 92  Resp:  [18-20] 20  BP: (109-123)/(69-79) 123/79  SpO2:  [93 %-95 %] 94 %  I/O's Last 24 hours  I/O last 3 completed shifts:  In: 240 [P.O.:240]  Out: 800 [Urine:800]    Constitutional: Less weak appearing  HEENT: No conjunctival pallor.  Neurologic: Awake, alert, fully oriented  Neck: Supple, no elevated JVP  Respiratory: Decreased breath sounds diffusely with better air movement  Cardiovascular: Normal S1 and S2. Regular rhythm, slightly tachy  GI: Abdomen soft, non tender, non distended  Extremities: No calf tenderness, no edema  Skin/integument: No acute rash, no cyanosis    Medications   All medications were reviewed.      aspirin  325 mg Oral Daily     enoxaparin  40 mg Subcutaneous Q24H     fluticasone-vilanterol  1 puff Inhalation Daily     ipratropium - albuterol 0.5 mg/2.5 mg/3 mL  3 mL Nebulization Q4H While awake     levofloxacin  500 mg Oral Daily     methylPREDNISolone  62.5 mg Intravenous Q6H     sodium chloride (PF)  3 mL Intracatheter Q8H     umeclidinium  1 puff Inhalation Daily        Data     Recent Labs  Lab 06/28/18  2140 06/28/18  1700 06/28/18  1426   WBC  --    --  4.8   HGB  --   --  16.0   MCV  --   --  99   *  --  152   NA  --   --  133   POTASSIUM  --   --  4.9   CHLORIDE  --   --  97   CO2  --   --  27   BUN  --   --  20   CR 0.88  --  1.00   ANIONGAP  --   --  9   SAMIRA  --   --  9.2   GLC  --   --  112*   TROPI  --  <0.015 Canceled, Test credited       No results found for this or any previous visit (from the past 24 hour(s)).

## 2018-06-30 NOTE — PLAN OF CARE
Problem: Patient Care Overview  Goal: Plan of Care/Patient Progress Review  Outcome: Improving  A&O x 4. Pleasant and cooperative. VSS on 1L O2 via nasal canula. Lung sounds diminished. HENRIQUEZ. Tele SD. Denied pain. IV SL. Up with stand by assist. Used urinal. Slept well. Continues on IV Solu-Medrol q6h. Expected discharge in 2-3 days. Will continue to monitor.

## 2018-06-30 NOTE — PLAN OF CARE
Problem: Patient Care Overview  Goal: Plan of Care/Patient Progress Review  Outcome: Improving  Patient is A&O, VSS on 1L NC, IV SL, on Tele, denied pain, voiding adequately in the urinal, and on Solu-medrol Q6H. Will continue to monitor

## 2018-07-01 ENCOUNTER — DOCUMENTATION ONLY (OUTPATIENT)
Dept: MEDSURG UNIT | Facility: CLINIC | Age: 73
End: 2018-07-01

## 2018-07-01 VITALS
HEIGHT: 72 IN | WEIGHT: 159.61 LBS | RESPIRATION RATE: 20 BRPM | HEART RATE: 88 BPM | BODY MASS INDEX: 21.62 KG/M2 | OXYGEN SATURATION: 91 % | TEMPERATURE: 98.2 F | SYSTOLIC BLOOD PRESSURE: 115 MMHG | DIASTOLIC BLOOD PRESSURE: 80 MMHG

## 2018-07-01 LAB
CREAT SERPL-MCNC: 0.9 MG/DL (ref 0.66–1.25)
GFR SERPL CREATININE-BSD FRML MDRD: 82 ML/MIN/1.7M2
PLATELET # BLD AUTO: 169 10E9/L (ref 150–450)

## 2018-07-01 PROCEDURE — 25000125 ZZHC RX 250: Performed by: INTERNAL MEDICINE

## 2018-07-01 PROCEDURE — 40000275 ZZH STATISTIC RCP TIME EA 10 MIN

## 2018-07-01 PROCEDURE — 25000132 ZZH RX MED GY IP 250 OP 250 PS 637: Performed by: INTERNAL MEDICINE

## 2018-07-01 PROCEDURE — 25000125 ZZHC RX 250: Performed by: HOSPITALIST

## 2018-07-01 PROCEDURE — 85049 AUTOMATED PLATELET COUNT: CPT | Performed by: HOSPITALIST

## 2018-07-01 PROCEDURE — 99239 HOSP IP/OBS DSCHRG MGMT >30: CPT | Performed by: INTERNAL MEDICINE

## 2018-07-01 PROCEDURE — 94640 AIRWAY INHALATION TREATMENT: CPT

## 2018-07-01 PROCEDURE — 36415 COLL VENOUS BLD VENIPUNCTURE: CPT | Performed by: HOSPITALIST

## 2018-07-01 PROCEDURE — 82565 ASSAY OF CREATININE: CPT | Performed by: HOSPITALIST

## 2018-07-01 PROCEDURE — 94640 AIRWAY INHALATION TREATMENT: CPT | Mod: 76

## 2018-07-01 PROCEDURE — 25000132 ZZH RX MED GY IP 250 OP 250 PS 637: Performed by: HOSPITALIST

## 2018-07-01 RX ORDER — ALBUTEROL SULFATE 0.83 MG/ML
2.5 SOLUTION RESPIRATORY (INHALATION)
Qty: 360 ML | Refills: 3 | Status: SHIPPED | OUTPATIENT
Start: 2018-07-01 | End: 2019-09-09

## 2018-07-01 RX ORDER — ASPIRIN 325 MG
325 TABLET ORAL DAILY
Qty: 120 TABLET | Refills: 3 | Status: SHIPPED | OUTPATIENT
Start: 2018-07-02 | End: 2019-05-13

## 2018-07-01 RX ORDER — LEVOFLOXACIN 500 MG/1
500 TABLET, FILM COATED ORAL DAILY
Qty: 3 TABLET | Refills: 0 | Status: SHIPPED | OUTPATIENT
Start: 2018-07-02 | End: 2018-07-05

## 2018-07-01 RX ORDER — PREDNISONE 20 MG/1
40 TABLET ORAL DAILY
Qty: 6 TABLET | Refills: 0 | Status: SHIPPED | OUTPATIENT
Start: 2018-07-02 | End: 2018-07-05

## 2018-07-01 RX ORDER — ALBUTEROL SULFATE 0.83 MG/ML
2.5 SOLUTION RESPIRATORY (INHALATION) 4 TIMES DAILY
Qty: 360 ML | Refills: 3 | Status: SHIPPED | OUTPATIENT
Start: 2018-07-01 | End: 2018-07-06

## 2018-07-01 RX ADMIN — UMECLIDINIUM 1 PUFF: 62.5 AEROSOL, POWDER ORAL at 08:25

## 2018-07-01 RX ADMIN — ASPIRIN 325 MG ORAL TABLET 325 MG: 325 PILL ORAL at 08:25

## 2018-07-01 RX ADMIN — IPRATROPIUM BROMIDE AND ALBUTEROL SULFATE 3 ML: .5; 3 SOLUTION RESPIRATORY (INHALATION) at 07:34

## 2018-07-01 RX ADMIN — FLUTICASONE FUROATE AND VILANTEROL TRIFENATATE 1 PUFF: 200; 25 POWDER RESPIRATORY (INHALATION) at 08:25

## 2018-07-01 RX ADMIN — LEVOFLOXACIN 500 MG: 500 TABLET, FILM COATED ORAL at 08:25

## 2018-07-01 RX ADMIN — IPRATROPIUM BROMIDE AND ALBUTEROL SULFATE 3 ML: .5; 3 SOLUTION RESPIRATORY (INHALATION) at 11:31

## 2018-07-01 RX ADMIN — PREDNISONE 40 MG: 20 TABLET ORAL at 08:25

## 2018-07-01 NOTE — PLAN OF CARE
Problem: Patient Care Overview  Goal: Plan of Care/Patient Progress Review  Outcome: Improving  A/ox4. VSS on 1L via NC. No c/o Pain. Ambulating independetly in his room. Tolerating regular diet. LS diminished, Dyspnea on exertion. Pursed lipped breathing and IS encouraged. Possible d/c in 1-2 days. Will continue to monitor pt.

## 2018-07-01 NOTE — PROGRESS NOTES
Received intake call for home oxygen at 11:25AM. Reviewed patient's chart; Patient qualifies under Medicare guidelines and all documentation is in the chart including a good order.   11:40AM- Spoke with care coordinator, Sujey, confirmed we received the order, and provided them with ETA of oxygen.   11:54AM- Called to offer choice and patient is okay with [facility] setting them up. Discussed equipment with patient and informed them that we would be to bedside with oxygen in the next 2 hours.

## 2018-07-01 NOTE — PROGRESS NOTES
Pt is on NC 1LPM with Sp02 95%. Lung sounds diminished throughout. Neb tx given as schedule. Will continue to follow.  7/1/2018  Elizabeth Kulkarni

## 2018-07-01 NOTE — DISCHARGE INSTRUCTIONS
Pt's cell phone,cash, inhaler were sent down to security, in envelope #[id number]  Pt has clothes, wallet, shoes, full bottom and top dentures in room.     Oxygen Provider:  Arranged through [facility], contact number [phone]. If you have any questions or concerns please call the oxygen company directly.

## 2018-07-01 NOTE — PLAN OF CARE
Problem: Patient Care Overview  Goal: Plan of Care/Patient Progress Review  Outcome: Improving  A&O x 4. Pleasant and cooperative. VSS on 1L O2 via nasal canula. Lung sounds diminished. HENRIQUEZ. Tele ST. Denied pain. IV SL. Up Independently to use urinal at bedside. Calls appropriately. Slept well. IV SL. Expected discharge in 2-3 days. Will continue to monitor.

## 2018-07-01 NOTE — PROGRESS NOTES
[facility]  Hospitalist Progress Note  Jl Marquez MD    Assessment & Plan   Mr. Flavia Luu is a 72-year-old male with PMHx significant for non-oxygen dependent COPD and atrial flutter ablation (01/2013) who presented with increasing shortness of breath for one day. He was admitted for further management.     Acute COPD exacerbation with acute hypoxic respiratory failure:  Improving  [PTA: albuterol neb daily, albuterol MDI 1-2 puffs every 4 hrs prn, Advair 500-50 bid, DuoNeb prn, Spiriva daily]  Presented with increasing dyspnea for one day. Hypoxic with O2 sat of 88% on admission. CBC with normal WBC of 4.8 and Hgb of 16. Lactate 1.6. Procalcitonin 0.62. CXR negative for acute cardiopulmonary disease. D dimer elevated at 4.6, but CT chest negative for PE or active pulmonary diease. It showed 0.8 x 0.5 cm pulmonary nodule left lung apex, new since 1/14/2013 and moderate to marked emphysematous changes in the lungs. Started on IV Solu-Medrol, Levaquin, and aggressive neb therapy upon admission. Blood cultures negative to date.    -Cont DuoNeb q. 4 hours scheduled and albuterol nebs q. 2 hours prn  -Continue Incruse Ellipta  -Continue Levaquin empirically for 3 more days  -Changed IV Solu-Medrol to po prednisone 06/30. Continue for 3 more days.  -Continue O2 supplement, (currently on 1 L/min). Will arrange for home O2    Lung nodule (left lung apex):    Incidentally noted a 0.8 x 0.5 cm pulmonary nodule left lung apex, new since 01/14/2013.   -Follow up with PCP in one week and consider further work-up as outpatient. Discussed with the patient.    Atrial flutter s/p ablation in 01/2013:   EKG in ED showed accelerated junctional rhythm versus atrial flutter. Now in normal sinus rhythm.  -CHADS2-VASc score is 1. Started on aspirin.    -Continue telemetry  -Follow up with PCP     # Pain Assessment:  Current Pain Score 7/1/2018   Patient currently in pain? denies   Pain score (0-10) 0   Flavia robison  pain level was assessed and he currently denies pain.        Active Diet Order      Combination Diet Regular Diet Adult    DVT prophylaxis: Lovenox.     CODE STATUS:  DNR/DNI.   Disposition: Home today with home O2.    D/W RN.    Interval History   Patient overall feels better and less dyspneic. He reports no fever, sputum, cp, palpitation, wheezing. No new issue overnight. O2 sat drops to 87-88% off O2 supplement.    Data reviewed today: I reviewed all new labs and imaging over the last 24 hours. I personally reviewed no images or EKG's today.    Physical Exam   Temp: 98.2  F (36.8  C) Temp src: Oral BP: 115/80 Pulse: 88 Heart Rate: 83 Resp: 20 SpO2: 95 % O2 Device: Nasal cannula Oxygen Delivery: 1 LPM  Vitals:    06/28/18 1650 06/28/18 2037   Weight: 71.7 kg (158 lb) 72.4 kg (159 lb 9.8 oz)     Vital Signs with Ranges  Temp:  [97.9  F (36.6  C)-98.7  F (37.1  C)] 98.2  F (36.8  C)  Pulse:  [88] 88  Heart Rate:  [83-94] 83  Resp:  [20] 20  BP: (102-115)/(66-80) 115/80  SpO2:  [89 %-98 %] 95 %  I/O's Last 24 hours  I/O last 3 completed shifts:  In: 720 [P.O.:720]  Out: 750 [Urine:750]    Constitutional: Comfortable  HEENT: No conjunctival pallor.  Neurologic: Awake, alert, fully oriented  Neck: Supple, no elevated JVP  Respiratory: Decreased breath sounds diffusely with some air movement  Cardiovascular: Normal S1 and S2. Regular rhythm and rate  GI: Abdomen soft, non tender, non distended  Extremities: No calf tenderness, no edema  Skin/integument: No acute rash, no cyanosis    Medications   All medications were reviewed.      aspirin  325 mg Oral Daily     enoxaparin  40 mg Subcutaneous Q24H     fluticasone-vilanterol  1 puff Inhalation Daily     ipratropium - albuterol 0.5 mg/2.5 mg/3 mL  3 mL Nebulization Q4H While awake     levofloxacin  500 mg Oral Daily     predniSONE  40 mg Oral Daily     sodium chloride (PF)  3 mL Intracatheter Q8H     umeclidinium  1 puff Inhalation Daily        Data     Recent Labs  Lab  07/01/18  0846 06/28/18  2140 06/28/18  1700 06/28/18  1426   WBC  --   --   --  4.8   HGB  --   --   --  16.0   MCV  --   --   --  99    140*  --  152   NA  --   --   --  133   POTASSIUM  --   --   --  4.9   CHLORIDE  --   --   --  97   CO2  --   --   --  27   BUN  --   --   --  20   CR 0.90 0.88  --  1.00   ANIONGAP  --   --   --  9   SAMIRA  --   --   --  9.2   GLC  --   --   --  112*   TROPI  --   --  <0.015 Canceled, Test credited       No results found for this or any previous visit (from the past 24 hour(s)).

## 2018-07-01 NOTE — PROGRESS NOTES
D: SW following for DC planning, per protocol. SW reviewed chart. Noted DC to home orders with new home O2.  P: Referral called to [facility]. Updated bedside RN, Geoff. He will notify the pt to expect a phone call from [facility] to discuss details and to arrange equipment delivery. SW available as needed.     JAGRUTI Adams, LGSW  [phone]

## 2018-07-01 NOTE — DISCHARGE SUMMARY
[facility]  Discharge Summary        Flavai Luu MRN# [id number]   YOB: 1945 Age: 72 year old     Date of Admission:  6/28/2018  Date of Discharge:  7/1/2018  Admitting Physician:  Matt Hirsch MD  Discharge Physician:             Jl Marquez MD  Discharging Service:             Hospitalist     Primary Provider: Filemon Virgen  Primary Care Physician Phone Number: [phone]     Discharge Diagnoses:     Acute COPD exacerbation with acute hypoxic respiratory failure  Left lung apical nodule  Atrial flutter s/p ablation in 01/2013     Problem Oriented Hospital Course   Mr. Flavia Luu is a 72-year-old delightful gentleman with PMHx significant for non-oxygen dependent COPD and atrial flutter ablation (01/2013) who presented with increasing shortness of breath for one day. He was admitted for further management.     For a detailed history and physical exam, please refer to the dictated admission note by Dr. Matt Hirsch on 06/28/2018.       Acute COPD exacerbation with acute hypoxic respiratory failure:   [PTA: albuterol neb daily, albuterol MDI 1-2 puffs every 4 hrs prn, Advair 500-50 bid, DuoNeb prn, Spiriva daily]  Patient presented with increasing dyspnea for one day. Hypoxic with O2 sat of 88% on admission. CBC with normal WBC of 4.8 and Hgb of 16. Lactate 1.6. Procalcitonin 0.62. CXR negative for acute cardiopulmonary disease. D dimer elevated at 4.6, but CT chest negative for PE or active pulmonary diease. It showed 0.8 x 0.5 cm pulmonary nodule in left lung apex, new since 1/14/2013, and moderate to marked emphysematous changes in the lungs. Started on IV Solu-Medrol, Levaquin, and aggressive neb therapy (DueNeb) upon admission. Blood cultures remained negative. IV Solu-Medrol transitioned to po prednisone on 06/30 to continue for 4 more days. He had a satisfactory response to treatment but remained hypoxic on RA (87-88%) and therefore was prescribed  home O2 at 1 L/min.        Lung nodule (left lung apex):    Incidentally noted on CT chest which reported a 0.8 x 0.5 cm pulmonary nodule in left lung apex, new since 01/14/2013. This was concerning for malignancy. He was instructed to follow up with PCP and have a repeat CT done within 3 months.       Atrial flutter s/p ablation in 01/2013:   EKG in ED showed accelerated junctional rhythm versus atrial flutter which converted to normal sinus rhythm. CHADS2-VASc score was 1 and he was started on aspirin. Following admission there was no recurrence of abnormal rhythm.           Code Status:      DNR / DNI        Brief Hospital Stay Summary Sent Home With Patient in AVS:        Reason for your hospital stay       Acute COPD exacerbation                      Important Results:      Cr 0.9, Hgb 16        Pending Results:        Unresulted Labs Ordered in the Past 30 Days of this Admission     Date and Time Order Name Status Description    6/28/2018 2044 Blood culture Preliminary     6/28/2018 2044 Blood culture Preliminary               Discharge Instructions and Follow-Up:      Follow-up Appointments     Follow-up and recommended labs and tests        See your PCP in one week for follow up on COPD as well as lung nodule.                        Discharge Disposition:      Discharged to home        Discharge Medications:        Current Discharge Medication List      START taking these medications    Details   predniSONE (DELTASONE) 20 MG tablet Take 2 tablets (40 mg) by mouth daily for 3 days  Qty: 6 tablet, Refills: 0    Associated Diagnoses: COPD exacerbation (H)         CONTINUE these medications which have CHANGED    Details   !! albuterol (2.5 MG/3ML) 0.083% neb solution Take 1 vial (2.5 mg) by nebulization 4 times daily  Qty: 360 mL, Refills: 3    Comments: Future refills by PCP Dr. Filemon Fletcher with phone number [phone].  Associated Diagnoses: COPD exacerbation (H)      !! albuterol (2.5 MG/3ML) 0.083%  neb solution Take 1 vial (2.5 mg) by nebulization every 2 hours as needed for shortness of breath / dyspnea  Qty: 360 mL, Refills: 3    Comments: Future refills by PCP Dr. Filemon Fletcher with phone number [phone].  Associated Diagnoses: COPD exacerbation (H)      levofloxacin (LEVAQUIN) 500 MG tablet Take 1 tablet (500 mg) by mouth daily for 3 days  Qty: 3 tablet, Refills: 0    Associated Diagnoses: COPD exacerbation (H)       !! - Potential duplicate medications found. Please discuss with provider.      CONTINUE these medications which have NOT CHANGED    Details   albuterol (PROAIR HFA) 108 (90 BASE) MCG/ACT Inhaler Inhale 1-2 puffs into the lungs every 4 hours as needed for shortness of breath / dyspnea  Qty: 1 Inhaler, Refills: 11    Comments: PROFILE FOR FUTURE REFILLS  Associated Diagnoses: Pulmonary emphysema, unspecified emphysema type (H)      fluticasone-salmeterol (ADVAIR) 500-50 MCG/DOSE diskus inhaler Inhale 1 puff into the lungs 2 times daily  Qty: 1 Inhaler, Refills: 11    Comments: PROFILE FOR FUTURE REFILLS  Associated Diagnoses: Pulmonary emphysema, unspecified emphysema type (H)      IBUPROFEN PO Take 200 mg by mouth 4 times daily as needed (aches and pain)       tiotropium (SPIRIVA HANDIHALER) 18 MCG capsule Inhale 1 capsule (18 mcg) into the lungs daily Inhale contents of one capsule  Qty: 30 capsule, Refills: 11    Comments: PROFILE FOR FUTURE REFILLS  Associated Diagnoses: Pulmonary emphysema, unspecified emphysema type (H)         STOP taking these medications       ipratropium - albuterol 0.5 mg/2.5 mg/3 mL (DUONEB) 0.5-2.5 (3) MG/3ML neb solution Comments:   Reason for Stopping:                   Allergies:       No Known Allergies        Consultations This Hospital Stay:      None        Condition and Physical on Discharge:      Discharge condition: Stable   Vitals: Blood pressure 115/80, pulse 88, temperature 98.2  F (36.8  C), temperature source Oral, resp. rate 20, height 1.829  m (6'), weight 72.4 kg (159 lb 9.8 oz), SpO2 95 %.           Diet and Activity:     After Care Instructions     Activity       Your activity upon discharge: activity as tolerated            Diet       Follow this diet upon discharge: Orders Placed This Encounter      Combination Diet Regular Diet Adult            Oxygen Adult       [place] Oxygen Order 1 liter(s) by nasal cannula continuously with use of portable tank. Expected treatment length is indefinite (99 months).. Test on conserving device as applicable.    Patients who qualify for home O2 coverage under the CMS guidelines require ABG tests or O2 sat readings obtained closest to, but no earlier than 2 days prior to the discharge, as evidence of the need for home oxygen therapy. Testing must be performed while patient is in the chronic stable state. See notes for O2 sats.    I certify that this patient, Flavia Luu has been under my care and that I, or a nurse practitioner or physician's assistant working with me, had a face-to-face encounter that meets the face-to-face encounter requirements with this patient on 7/1/2018. The patient, Flavia Luu was evaluated or treated in whole, or in part, for the following medical condition, which necessitates the use of the ordered oxygen. Treatment Diagnosis: COPD    Attending Provider: Jl Marquez  Physician signature: See electronic signature associated with these discharge orders  Date of Order: July 1, 2018                            Discharge Time:      Greater than 30 minutes.        Image Results From This Hospital Stay (For Non-EPIC Providers):        Results for orders placed or performed during the hospital encounter of 06/28/18   Chest CT, IV contrast only - PE protocol    Narrative    CT CHEST WITH CONTRAST   6/28/2018 6:49 PM     HISTORY: Shortness of breath. Elevated D-dimer.    COMPARISON: 1/14/2013.    TECHNIQUE: Following the uneventful administration of 62mL Isovue-370  intravenous  contrast, helical sections were acquired through the lungs  according to the pulmonary embolism protocol. Coronal reconstructions  were generated. Radiation dose for this scan was reduced using  automated exposure control, adjustment of the mA and/or kV according  to the patient's size, or iterative reconstruction technique.    FINDINGS: No visualized pulmonary embolus. The thoracic aorta is  normal in caliber without dissection. Coronary artery calcification.    Moderate to marked emphysematous changes in the lungs. A few linear  opacities scattered in the periphery of the lungs likely represent  scarring or atelectasis. 0.8 x 0.5 cm nodular opacity in the left lung  apex (series 6 image 26), new since 1/14/2013. The lungs are otherwise  clear. No pleural or pericardial effusion. No enlarged lymph nodes in  the chest.    Scan through the upper abdomen is significant for a 0.4 cm  nonobstructing calculus in the inferior pole of the right kidney.      Impression    IMPRESSION:   1. No visualized pulmonary embolus.  2. No evidence of active pulmonary disease.  3. 0.8 x 0.5 cm pulmonary nodule left lung apex, new since 1/14/2013.  This could represent a small primary pulmonary neoplasm. A follow-up  CT scan is recommended in 3 months for reevaluation.  4. Moderate to marked emphysematous changes in the lungs.     MERCEDES FIGUEROA MD     No results found for this or any previous visit (from the past 4320 hour(s)).        Most Recent Lab Results In EPIC (For Non-EPIC Providers):    Most Recent 3 CBC's:  Recent Labs   Lab Test  07/01/18   0846  06/28/18   2140  06/28/18   1426  01/19/13   0515  01/17/13   0613   WBC   --    --   4.8  8.3  7.8   HGB   --    --   16.0  13.4  12.6*   MCV   --    --   99  96  99   PLT  169  140*  152  229  248      Most Recent 3 BMP's:  Recent Labs   Lab Test  07/01/18   0846  06/28/18   2140  06/28/18   1426  09/11/17   1506  08/18/15   1229   NA   --    --   133  138  135   POTASSIUM   --     --   4.9  4.3  4.4   CHLORIDE   --    --   97  101  99   CO2   --    --   27  28  26   BUN   --    --   20  18  12   CR  0.90  0.88  1.00  0.88  0.90   ANIONGAP   --    --   9  9  10   SAMIRA   --    --   9.2  9.3  9.1   GLC   --    --   112*  91  105*     Most Recent 3 Troponin's:  Recent Labs   Lab Test  06/28/18   1700  06/28/18   1426  01/14/13   0642   TROPI  <0.015  Canceled, Test credited  0.057*     Most Recent 3 INR's:  Recent Labs   Lab Test  01/14/13   1551   INR  1.08     Most Recent 2 LFT's:No lab results found.  Most Recent Cholesterol Panel:  Recent Labs   Lab Test  08/18/15   1229   CHOL  211*   LDL  121   HDL  75   TRIG  76     Most Recent 6 Bacteria Isolates From Any Culture (See EPIC Reports for Culture Details):  Recent Labs   Lab Test  06/28/18   2140  06/28/18   2135  01/14/13   1200  01/13/13   1515  01/13/13   1457   CULT  No growth after 3 days  No growth after 3 days  Normal kanwal  No growth  No growth     Most Recent TSH, T4 and HgbA1c:   Recent Labs   Lab Test  01/14/13   1324   TSH  0.15*   T4  2.47*

## 2018-07-01 NOTE — PROGRESS NOTES
Met with the patient at the bedside A+Ox4.  Discussed d/c planning with new O2 and recommended follow up with PCP.  Patient allowed CC to schedule f/up with PCP provider for next Friday.  In AVS discharge.  Patient has no further concerns with discharge at this point. Updated bedside RN with appointment information.

## 2018-07-01 NOTE — PLAN OF CARE
Problem: Patient Care Overview  Goal: Plan of Care/Patient Progress Review  Outcome: Adequate for Discharge Date Met: 07/01/18  A/Ox4, VSS on 1L NCO2.  Denies pain.  Discharge complete; AVS, medications, prescriptions reviewed.  Belongings returned from security.  Home O2 explained to patient by delivering facility.  Wife updated regarding POC and follow up appointments.  Questions answered, verbalizes understanding.  Tele SR with PAC's.    Discharge    Patient discharged to home via wheelchair with wife at door 6.      Listed belongings gathered and returned to patient. Yes  Care Plan and Patient education resolved: Yes  Prescriptions if needed, hard copies sent with patient  Yes  Home and hospital acquired medications returned to patient: Yes  Medication Bin checked and emptied on discharge Yes  Follow up appointment made for patient: Yes

## 2018-07-02 ENCOUNTER — TELEPHONE (OUTPATIENT)
Dept: INTERNAL MEDICINE | Facility: CLINIC | Age: 73
End: 2018-07-02

## 2018-07-03 NOTE — TELEPHONE ENCOUNTER
"Hospital/TCU/ED for chronic condition Discharge Protocol    \"Hi, my name is Yasmine Gomez, a registered nurse, and I am calling from [facility] (formerly [facility]).  I am calling to follow up and see how things are going for you after your recent emergency visit/hospital/TCU stay.\"    Tell me how you are doing now that you are home?\" doing well      Discharge Instructions    \"Let's review your discharge instructions.  What is/are the follow-up recommendations?  Pt. Response: f/u on Friday.  Is using oxygetn    \"Has an appointment with your primary care provider been scheduled?\"   Yes. (confirm)    \"When you see the provider, I would recommend that you bring your medications with you.\"    Medications    \"Tell me what changed about your medicines when you discharged?\"    Changes to chronic meds?    0-1    \"What questions do you have about your medications?\"    None     New diagnoses of heart failure, COPD, diabetes, or MI?    No              Medication reconciliation completed? Yes  Was MTM referral placed (*Make sure to put transitions as reason for referral)?   No    Call Summary    \"What questions or concerns do you have about your recent visit and your follow-up care?\"     none    \"If you have questions or things don't continue to improve, we encourage you contact us through the main clinic number (give number).  Even if the clinic is not open, triage nurses are available 24/7 to help you.     We would like you to know that our clinic has extended hours (provide information).  We also have urgent care (provide details on closest location and hours/contact info)\"      \"Thank you for your time and take care!\"             "

## 2018-07-03 NOTE — TELEPHONE ENCOUNTER
ED / Discharge Outreach Protocol    Patient Contact    Attempt # 2    Was call answered?  No.  Left message on voicemail with information to call me back.

## 2018-07-04 LAB
BACTERIA SPEC CULT: NO GROWTH
BACTERIA SPEC CULT: NO GROWTH
Lab: NORMAL
Lab: NORMAL
SPECIMEN SOURCE: NORMAL
SPECIMEN SOURCE: NORMAL

## 2018-07-06 ENCOUNTER — OFFICE VISIT (OUTPATIENT)
Dept: INTERNAL MEDICINE | Facility: CLINIC | Age: 73
End: 2018-07-06
Payer: COMMERCIAL

## 2018-07-06 VITALS
RESPIRATION RATE: 14 BRPM | BODY MASS INDEX: 21.66 KG/M2 | HEART RATE: 55 BPM | SYSTOLIC BLOOD PRESSURE: 130 MMHG | TEMPERATURE: 97.9 F | DIASTOLIC BLOOD PRESSURE: 86 MMHG | WEIGHT: 159.7 LBS | OXYGEN SATURATION: 95 %

## 2018-07-06 DIAGNOSIS — J44.1 COPD EXACERBATION (H): Primary | ICD-10-CM

## 2018-07-06 DIAGNOSIS — J43.1 PANLOBULAR EMPHYSEMA (H): ICD-10-CM

## 2018-07-06 PROCEDURE — 99207 ZZC RSCC CODE FOR CODING REVIEW: CPT | Performed by: INTERNAL MEDICINE

## 2018-07-06 PROCEDURE — 99495 TRANSJ CARE MGMT MOD F2F 14D: CPT | Performed by: INTERNAL MEDICINE

## 2018-07-06 NOTE — PROGRESS NOTES
SUBJECTIVE:   Flavia Luu is a 72 year old male who presents to clinic today for the following health issues:          Hospital Follow-up Visit:    Hospital/Nursing Home/IP Rehab Facility: [facility]  Date of Admission: 6/28/18  Date of Discharge: 7/1/18  Reason(s) for Admission: COPD with acute hypoxic respiratory failure            Problems taking medications regularly:  None       Medication changes since discharge: Changed albuterol inhaler to no longer have any other added medications       Problems adhering to non-medication therapy:  None    Summary of hospitalization:  [facility] discharge summary reviewed  Diagnostic Tests/Treatments reviewed.  Follow up needed: none  Other Healthcare Providers Involved in Patient s Care:         None  Update since discharge: improved.     Post Discharge Medication Reconciliation: discharge medications reconciled, continue medications without change.  Plan of care communicated with patient       Mr. Flavia Luu is a 72-year-old delightful gentleman with PMHx significant for non-oxygen dependent COPD and atrial flutter ablation (01/2013) who presented with increasing shortness of breath for one day. He was admitted for further management.      For a detailed history and physical exam, please refer to the dictated admission note by Dr. Matt Hirsch on 06/28/2018.      Acute COPD exacerbation with acute hypoxic respiratory failure:   [PTA: albuterol neb daily, albuterol MDI 1-2 puffs every 4 hrs prn, Advair 500-50 bid, DuoNeb prn, Spiriva daily]  Patient presented with increasing dyspnea for one day. Hypoxic with O2 sat of 88% on admission. CBC with normal WBC of 4.8 and Hgb of 16. Lactate 1.6. Procalcitonin 0.62. CXR negative for acute cardiopulmonary disease. D dimer elevated at 4.6, but CT chest negative for PE or active pulmonary diease. It showed 0.8 x 0.5 cm pulmonary nodule in left lung apex, new since 1/14/2013, and moderate to marked  emphysematous changes in the lungs. Started on IV Solu-Medrol, Levaquin, and aggressive neb therapy (DueNeb) upon admission. Blood cultures remained negative. IV Solu-Medrol transitioned to po prednisone on 06/30 to continue for 4 more days. He had a satisfactory response to treatment but remained hypoxic on RA (87-88%) and therefore was prescribed home O2 at 1 L/min.        Lung nodule (left lung apex):    Incidentally noted on CT chest which reported a 0.8 x 0.5 cm pulmonary nodule in left lung apex, new since 01/14/2013. This was concerning for malignancy. He was instructed to follow up with PCP and have a repeat CT done within 3 months.       Atrial flutter s/p ablation in 01/2013:   EKG in ED showed accelerated junctional rhythm versus atrial flutter which converted to normal sinus rhythm. CHADS2-VASc score was 1 and he was started on aspirin. Following admission there was no recurrence of abnormal rhythm.      Problem list and histories reviewed & adjusted, as indicated.  Additional history: as documented    Labs reviewed in EPIC    Reviewed and updated as needed this visit by clinical staff       Reviewed and updated as needed this visit by Provider         ROS:  Constitutional, HEENT, cardiovascular, pulmonary, gi and gu systems are negative, except as otherwise noted.    OBJECTIVE:                                                    /86 (BP Location: Right arm, Patient Position: Chair, Cuff Size: Adult Regular)  Pulse 55  Temp 97.9  F (36.6  C) (Oral)  Resp 14  Wt 159 lb 11.2 oz (72.4 kg)  SpO2 95%  BMI 21.66 kg/m2  Body mass index is 21.66 kg/(m^2).  GENERAL APPEARANCE: alert and no distress  RESP: expiratory wheezes bilateral and prolonged expiratory phase  CV: regular rates and rhythm, normal S1 S2, no S3 or S4 and no murmur, click or rub    Diagnostic test results:  none      ASSESSMENT/PLAN:                                                    1. COPD exacerbation (H)  Seems to have resolved.   I think he is getting some relief with the oxygen and this likely will turn into a long-term use.    2. Panlobular emphysema (H)  Continue oxygen  Continue his current inhalers      Filemon Fletcher MD  [facility]

## 2018-07-06 NOTE — MR AVS SNAPSHOT
After Visit Summary   7/6/2018    Flavia Luu    MRN: [id number]           Patient Information     Date Of Birth          1945        Visit Information        Provider Department      7/6/2018 1:00 PM Filemon Fletcher MD [facility]        Today's Diagnoses     COPD exacerbation (H)    -  1    Panlobular emphysema (H)           Follow-ups after your visit        Who to contact     If you have questions or need follow up information about today's clinic visit or your schedule please contact [facility] directly at [phone].  Normal or non-critical lab and imaging results will be communicated to you by MyChart, letter or phone within 4 business days after the clinic has received the results. If you do not hear from us within 7 days, please contact the clinic through MyChart or phone. If you have a critical or abnormal lab result, we will notify you by phone as soon as possible.  Submit refill requests through [facility] or call your pharmacy and they will forward the refill request to us. Please allow 3 business days for your refill to be completed.          Additional Information About Your Visit        Care EveryWhere ID     This is your Care EveryWhere ID. This could be used by other organizations to access your [place] medical records  [id number]        Your Vitals Were     Pulse Temperature Respirations Pulse Oximetry BMI (Body Mass Index)       55 97.9  F (36.6  C) (Oral) 14 95% 21.66 kg/m2        Blood Pressure from Last 3 Encounters:   07/06/18 130/86   07/01/18 115/80   06/28/18 122/74    Weight from Last 3 Encounters:   07/06/18 159 lb 11.2 oz (72.4 kg)   06/28/18 159 lb 9.8 oz (72.4 kg)   06/28/18 158 lb 9.6 oz (71.9 kg)              We Performed the Following     PAF COMPLETED          Today's Medication Changes          These changes are accurate as of 7/6/18  1:25 PM.  If you have any questions, ask your nurse or doctor.                These medicines have changed or have updated prescriptions.        Dose/Directions    * albuterol 108 (90 Base) MCG/ACT Inhaler   Commonly known as:  PROAIR HFA   This may have changed:  Another medication with the same name was removed. Continue taking this medication, and follow the directions you see here.   Used for:  Pulmonary emphysema, unspecified emphysema type (H)   Changed by:  Filemon Fletcher MD        Dose:  1-2 puff   Inhale 1-2 puffs into the lungs every 4 hours as needed for shortness of breath / dyspnea   Quantity:  1 Inhaler   Refills:  11       * albuterol (2.5 MG/3ML) 0.083% neb solution   This may have changed:  Another medication with the same name was removed. Continue taking this medication, and follow the directions you see here.   Used for:  COPD exacerbation (H)   Changed by:  Filemon Fletcher MD        Dose:  2.5 mg   Take 1 vial (2.5 mg) by nebulization every 2 hours as needed for shortness of breath / dyspnea   Quantity:  360 mL   Refills:  3       * Notice:  This list has 2 medication(s) that are the same as other medications prescribed for you. Read the directions carefully, and ask your doctor or other care provider to review them with you.             Primary Care Provider Office Phone # Fax #    Filemon Fletcher [phone]       [address] [place] [phone]        Equal Access to Services     AASHISH [facility] : Hadii bradley nicole hadasho Sovincent, waaxda luqadaha, qaybta kaalmada sally, joseph gooden. [facility] [phone].    ATENCIÓN: Si habla español, tiene a rico disposición servicios gratuitos de asistencia lingüística. Llame al [phone].    We comply with applicable federal civil rights laws and [place] laws. We do not discriminate on the basis of race, color, national origin, age, disability, sex, sexual orientation, or gender identity.            Thank you!     Thank you for choosing [facility]  [facility]  for your care. Our goal is always to provide you with excellent care. Hearing back from our patients is one way we can continue to improve our services. Please take a few minutes to complete the written survey that you may receive in the mail after your visit with us. Thank you!             Your Updated Medication List - Protect others around you: Learn how to safely use, store and throw away your medicines at www.disposemymeds.org.          This list is accurate as of 7/6/18  1:25 PM.  Always use your most recent med list.                   Brand Name Dispense Instructions for use Diagnosis    * albuterol 108 (90 Base) MCG/ACT Inhaler    PROAIR HFA    1 Inhaler    Inhale 1-2 puffs into the lungs every 4 hours as needed for shortness of breath / dyspnea    Pulmonary emphysema, unspecified emphysema type (H)       * albuterol (2.5 MG/3ML) 0.083% neb solution     360 mL    Take 1 vial (2.5 mg) by nebulization every 2 hours as needed for shortness of breath / dyspnea    COPD exacerbation (H)       aspirin 325 MG tablet     120 tablet    Take 1 tablet (325 mg) by mouth daily    S/P ablation of atrial flutter       fluticasone-salmeterol 500-50 MCG/DOSE diskus inhaler    ADVAIR    1 Inhaler    Inhale 1 puff into the lungs 2 times daily    Pulmonary emphysema, unspecified emphysema type (H)       IBUPROFEN PO      Take 200 mg by mouth 4 times daily as needed (aches and pain)        tiotropium 18 MCG capsule    SPIRIVA HANDIHALER    30 capsule    Inhale 1 capsule (18 mcg) into the lungs daily Inhale contents of one capsule    Pulmonary emphysema, unspecified emphysema type (H)       * Notice:  This list has 2 medication(s) that are the same as other medications prescribed for you. Read the directions carefully, and ask your doctor or other care provider to review them with you.

## 2018-07-20 ENCOUNTER — DOCUMENTATION ONLY (OUTPATIENT)
Dept: SLEEP MEDICINE | Facility: CLINIC | Age: 73
End: 2018-07-20

## 2018-07-20 NOTE — PROGRESS NOTES
PULSE DOSE THERAPY EVALUATION COMPLETED ON 18 IN PATIENT'S HOME.  S: PT ON 0.5L O2 CONT. DURING THE DAY/ 2L CONT. @ NOC.   B: COPD; ON O2 WITH US SINCE 18  A: PT DOES APPEAR SOB OFF O2 W/ ACTIVITY/ ALONG WITH INCREASED HR. 88-89% SPO2 SEEMS TO BE HIS BASELINE @ REST. PT TOLERATED PD THERAPY WELL AND THINK IT WOULD MAKE A POSITIVE CONTRIBUTION TO HIS OVERALL MOBILITY & QUALITY OF LIFE.    R: 3-5L PD THERAPY DURING DAY & WITH ACTIVITY/ CONTINUE 2L CONT. @ NOC.    RESULTS:  RA RESTING 88% SPO2/ HR 95  0.5 LPM O2 CONT. RESTIN% SPO2, 94 HR  5 LPM O2 PULSE DOSE POST 1 MIN WALK:  91% SPO2, 101 HR  4 LPM O2 PULSE DOSE POST 1 MIN WALK:  94% SPO2, 114 HR  3 LPM O2 PULSE DOSE POST 1 MIN. WALK: 89% SPO2, 109 HR    AS A RESULT, RECOMMENDING 3-5L PD THERAPY AND PATIENT WOULD LIKE TO SWAP OUT POC FOR SIMPLY GO-MINI + O2 CONCENTRATOR.

## 2018-09-10 ENCOUNTER — OFFICE VISIT (OUTPATIENT)
Dept: INTERNAL MEDICINE | Facility: CLINIC | Age: 73
End: 2018-09-10
Payer: COMMERCIAL

## 2018-09-10 VITALS
WEIGHT: 155.8 LBS | OXYGEN SATURATION: 95 % | DIASTOLIC BLOOD PRESSURE: 80 MMHG | TEMPERATURE: 97.7 F | SYSTOLIC BLOOD PRESSURE: 130 MMHG | RESPIRATION RATE: 24 BRPM | HEART RATE: 81 BPM | BODY MASS INDEX: 21.13 KG/M2

## 2018-09-10 DIAGNOSIS — Z00.00 MEDICARE ANNUAL WELLNESS VISIT, SUBSEQUENT: Primary | ICD-10-CM

## 2018-09-10 DIAGNOSIS — J43.9 PULMONARY EMPHYSEMA, UNSPECIFIED EMPHYSEMA TYPE (H): ICD-10-CM

## 2018-09-10 DIAGNOSIS — Z13.6 CARDIOVASCULAR SCREENING; LDL GOAL LESS THAN 160: ICD-10-CM

## 2018-09-10 DIAGNOSIS — R91.1 LUNG NODULE: ICD-10-CM

## 2018-09-10 PROCEDURE — G0439 PPPS, SUBSEQ VISIT: HCPCS | Performed by: INTERNAL MEDICINE

## 2018-09-10 RX ORDER — ALBUTEROL SULFATE 90 UG/1
1-2 AEROSOL, METERED RESPIRATORY (INHALATION) EVERY 4 HOURS PRN
Qty: 1 INHALER | Refills: 11 | Status: SHIPPED | OUTPATIENT
Start: 2018-09-10 | End: 2019-09-09

## 2018-09-10 RX ORDER — TIOTROPIUM BROMIDE 18 UG/1
18 CAPSULE ORAL; RESPIRATORY (INHALATION) DAILY
Qty: 30 CAPSULE | Refills: 11 | Status: SHIPPED | OUTPATIENT
Start: 2018-09-10 | End: 2019-09-09

## 2018-09-10 NOTE — PROGRESS NOTES
SUBJECTIVE:   Flavia Luu is a 73 year old male who presents for Preventive Visit.    Are you in the first 12 months of your Medicare Part B coverage?  No    Healthy Habits:    Do you get at least three servings of calcium containing foods daily (dairy, green leafy vegetables, etc.)? yes    Amount of exercise or daily activities, outside of work: none    Problems taking medications regularly No    Medication side effects: No    Have you had an eye exam in the past two years? scheduled    Do you see a dentist twice per year? no    Do you have sleep apnea, excessive snoring or daytime drowsiness?no      Ability to successfully perform activities of daily living: Yes, no assistance needed    Home safety:  none identified     Hearing impairment: No    Fall risk:       COGNITIVE SCREEN  1) Repeat 3 items (Leader, Season, Table)    2) Clock draw: ABNORMAL extra numbers  3) 3 item recall: Recalls 1 object   Results: ABNORMAL clock, 1-2 items recalled: PROBABLE COGNITIVE IMPAIRMENT, **INFORM PROVIDER**    Mini-CogTM Copyright TICO Catsillo. Licensed by the author for use in [facility]; reprinted with permission ([email or web address]). All rights reserved.        Reviewed and updated as needed this visit by clinical staff  Tobacco  Allergies  Meds         Reviewed and updated as needed this visit by Provider        Social History   Substance Use Topics     Smoking status: Former Smoker     Packs/day: 1.00     Years: 43.00     Types: Cigarettes     Quit date: 12/8/2008     Smokeless tobacco: Never Used     Alcohol use 0.0 oz/week     0 Standard drinks or equivalent per week      Comment: very rare       If you drink alcohol do you typically have >3 drinks per day or >7 drinks per week? No                        Today's PHQ-2 Score:   PHQ-2 ( 1999 [employer]) 7/6/2018 6/28/2018   Q1: Little interest or pleasure in doing things 0 0   Q2: Feeling down, depressed or hopeless 0 0   PHQ-2 Score 0 0       Do you feel safe in your  environment - Yes    Do you have a Health Care Directive?: No: Advance care planning was reviewed with patient; patient declined at this time.    Current providers sharing in care for this patient include:   Patient Care Team:  Filemon Fletcher MD as PCP - General    The following health maintenance items are reviewed in Epic and correct as of today:  Health Maintenance   Topic Date Due     COPD ACTION PLAN Q1 YR  1945     ADVANCE DIRECTIVE PLANNING Q5 YRS  03/21/2018     FALL RISK ASSESSMENT  06/28/2019     PHQ-2 Q1 YR  07/06/2019     LIPID SCREEN Q5 YR MALE (SYSTEM ASSIGNED)  08/18/2020     TETANUS Q10 YR  03/21/2023     PNEUMO 5YR BOOST DUE AFTER AGE 65 (NO IB MSG)  Completed     SPIROMETRY ONETIME  Completed     PNEUMOCOCCAL  Completed     INFLUENZA VACCINE  Completed     AORTIC ANEURYSM SCREENING (SYSTEM ASSIGNED)  Completed     Labs reviewed in EPIC        ROS:  Constitutional, HEENT, cardiovascular, pulmonary, GI, , musculoskeletal, neuro, skin, endocrine and psych systems are negative, except as otherwise noted.    OBJECTIVE:   /80  Pulse 81  Temp 97.7  F (36.5  C) (Oral)  Resp 24  Wt 155 lb 12.8 oz (70.7 kg)  SpO2 95%  BMI 21.13 kg/m2 Estimated body mass index is 21.13 kg/(m^2) as calculated from the following:    Height as of 6/28/18: 6' (1.829 m).    Weight as of this encounter: 155 lb 12.8 oz (70.7 kg).  EXAM:   GENERAL: alert, no distress, fatigued and appears older than stated age  EYES: Eyes grossly normal to inspection, PERRL and conjunctivae and sclerae normal  HENT: ear canals and TM's normal, nose and mouth without ulcers or lesions  NECK: no adenopathy, no asymmetry, masses, or scars and thyroid normal to palpation  RESP: lungs clear to auscultation - no rales, rhonchi or wheezes and decreased breath sounds throughout  CV: regular rate and rhythm, normal S1 S2, no S3 or S4, no murmur, click or rub, no peripheral edema and peripheral pulses strong  ABDOMEN: soft, nontender,  no hepatosplenomegaly, no masses and bowel sounds normal  MS: no gross musculoskeletal defects noted, no edema  SKIN: no suspicious lesions or rashes  NEURO: Normal strength and tone, mentation intact and speech normal  PSYCH: mentation appears normal, affect normal/bright  LYMPH: no cervical, supraclavicular, axillary, or inguinal adenopathy    none     ASSESSMENT / PLAN:   1. Medicare annual wellness visit, subsequent  Aged out of colon ca screening  Focus on pulm care given underlying significant COPD    2. Lung nodule  F/u CT this month   - CT Chest w/o Contrast; Future    3. Pulmonary emphysema, unspecified emphysema type (H)  o2 dependent. uptodate on immuniz  Cont inhalers  - tiotropium (SPIRIVA HANDIHALER) 18 MCG capsule; Inhale 1 capsule (18 mcg) into the lungs daily  Dispense: 30 capsule; Refill: 11  - fluticasone-salmeterol (ADVAIR) 500-50 MCG/DOSE diskus inhaler; Inhale 1 puff into the lungs 2 times daily  Dispense: 1 Inhaler; Refill: 11  - albuterol (PROAIR HFA) 108 (90 Base) MCG/ACT inhaler; Inhale 1-2 puffs into the lungs every 4 hours as needed for shortness of breath / dyspnea  Dispense: 1 Inhaler; Refill: 11    4. CARDIOVASCULAR SCREENING; LDL GOAL LESS THAN 160  - Basic metabolic panel; Future    End of Life Planning:  Patient currently has an advanced directive: Yes.  Practitioner is supportive of decision.    COUNSELING:  Reviewed preventive health counseling, as reflected in patient instructions    BP Readings from Last 1 Encounters:   09/10/18 130/80     Estimated body mass index is 21.13 kg/(m^2) as calculated from the following:    Height as of 6/28/18: 6' (1.829 m).    Weight as of this encounter: 155 lb 12.8 oz (70.7 kg).           reports that he quit smoking about 9 years ago. His smoking use included Cigarettes. He has a 43.00 pack-year smoking history. He has never used smokeless tobacco.      Appropriate preventive services were discussed with this patient, including applicable  screening as appropriate for cardiovascular disease, diabetes, osteopenia/osteoporosis, and glaucoma.  As appropriate for age/gender, discussed screening for colorectal cancer, prostate cancer, breast cancer, and cervical cancer. Checklist reviewing preventive services available has been given to the patient.    Reviewed patients plan of care and provided an AVS. The Basic Care Plan (routine screening as documented in Health Maintenance) for Flavia meets the Care Plan requirement. This Care Plan has been established and reviewed with the Patient.    Counseling Resources:  ATP IV Guidelines  Pooled Cohorts Equation Calculator  Breast Cancer Risk Calculator  FRAX Risk Assessment  ICSI Preventive Guidelines  Dietary Guidelines for Americans, 2010  USDA's MyPlate  ASA Prophylaxis  Lung CA Screening    Filemon Fletcher MD  [facility]

## 2018-09-10 NOTE — MR AVS SNAPSHOT
After Visit Summary   9/10/2018    Flavia Luu    MRN: [id number]           Patient Information     Date Of Birth          1945        Visit Information        Provider Department      9/10/2018 2:00 PM Filemon Fletchre MD [facility]        Today's Diagnoses     Medicare annual wellness visit, subsequent    -  1    Lung nodule        Pulmonary emphysema, unspecified emphysema type (H)        CARDIOVASCULAR SCREENING; LDL GOAL LESS THAN 160          Care Instructions      Preventive Health Recommendations:       Male Ages 65 and over    Yearly exam:             See your health care provider every year in order to  o   Review health changes.   o   Discuss preventive care.    o   Review your medicines if your doctor has prescribed any.    Talk with your health care provider about whether you should have a test to screen for prostate cancer (PSA).    Every 3 years, have a diabetes test (fasting glucose). If you are at risk for diabetes, you should have this test more often.    Every 5 years, have a cholesterol test. Have this test more often if you are at risk for high cholesterol or heart disease.     Every 10 years, have a colonoscopy. Or, have a yearly FIT test (stool test). These exams will check for colon cancer.    Talk to with your health care provider about screening for Abdominal Aortic Aneurysm if you have a family history of AAA or have a history of smoking.  Shots:     Get a flu shot each year.     Get a tetanus shot every 10 years.     Talk to your doctor about your pneumonia vaccines. There are now two you should receive - Pneumovax (PPSV 23) and Prevnar (PCV 13).    Talk to your pharmacist about a shingles vaccine.     Talk to your doctor about the hepatitis B vaccine.  Nutrition:     Eat at least 5 servings of fruits and vegetables each day.     Eat whole-grain bread, whole-wheat pasta and brown rice instead of white grains and rice.     Get adequate  Calcium and Vitamin D.   Lifestyle    Exercise for at least 150 minutes a week (30 minutes a day, 5 days a week). This will help you control your weight and prevent disease.     Limit alcohol to one drink per day.     No smoking.     Wear sunscreen to prevent skin cancer.     See your dentist every six months for an exam and cleaning.     See your eye doctor every 1 to 2 years to screen for conditions such as glaucoma, macular degeneration and cataracts.          Follow-ups after your visit        Future tests that were ordered for you today     Open Future Orders        Priority Expected Expires Ordered    Basic metabolic panel Routine  9/10/2019 9/10/2018    CT Chest w/o Contrast Routine 10/1/2018 9/10/2019 9/10/2018            Who to contact     If you have questions or need follow up information about today's clinic visit or your schedule please contact [facility] directly at [phone].  Normal or non-critical lab and imaging results will be communicated to you by MyChart, letter or phone within 4 business days after the clinic has received the results. If you do not hear from us within 7 days, please contact the clinic through MyChart or phone. If you have a critical or abnormal lab result, we will notify you by phone as soon as possible.  Submit refill requests through aWhere or call your pharmacy and they will forward the refill request to us. Please allow 3 business days for your refill to be completed.          Additional Information About Your Visit        Care EveryWhere ID     This is your Care EveryWhere ID. This could be used by other organizations to access your [place] medical records  [id number]        Your Vitals Were     Pulse Temperature Respirations Pulse Oximetry BMI (Body Mass Index)       81 97.7  F (36.5  C) (Oral) 24 95% 21.13 kg/m2        Blood Pressure from Last 3 Encounters:   09/10/18 130/80   07/06/18 130/86   07/01/18 115/80    Weight from Last 3  Encounters:   09/10/18 155 lb 12.8 oz (70.7 kg)   07/06/18 159 lb 11.2 oz (72.4 kg)   06/28/18 159 lb 9.8 oz (72.4 kg)                 Today's Medication Changes          These changes are accurate as of 9/10/18  2:48 PM.  If you have any questions, ask your nurse or doctor.               These medicines have changed or have updated prescriptions.        Dose/Directions    tiotropium 18 MCG capsule   Commonly known as:  SPIRIVA HANDIHALER   This may have changed:  additional instructions   Used for:  Pulmonary emphysema, unspecified emphysema type (H)   Changed by:  Filemon Fletcher MD        Dose:  18 mcg   Inhale 1 capsule (18 mcg) into the lungs daily   Quantity:  30 capsule   Refills:  11            Where to get your medicines      These medications were sent to [id number] - [place], [place] - [address]  [address] [address]     Phone:  [phone]     albuterol 108 (90 Base) MCG/ACT inhaler    fluticasone-salmeterol 500-50 MCG/DOSE diskus inhaler    tiotropium 18 MCG capsule                Primary Care Provider Office Phone # Fax #    Filemon Fletcher [phone]       [address] [facility] [address]        Equal Access to Services     MALIK CERVANTES AH: Hadii aad ku hadasho Soomaali, waaxda luqadaha, qaybta kaalmada adeegyada, waxay hussain hayyi hills . So [facility] [phone].    ATENCIÓN: Si habla español, tiene a rico disposición servicios gratuitos de asistencia lingüística. Llame al [phone].    We comply with applicable federal civil rights laws and [place] laws. We do not discriminate on the basis of race, color, national origin, age, disability, sex, sexual orientation, or gender identity.            Thank you!     Thank you for choosing [facility]  for your care. Our goal is always to provide you with excellent care. Hearing back from our patients is one way we can continue to improve our  services. Please take a few minutes to complete the written survey that you may receive in the mail after your visit with us. Thank you!             Your Updated Medication List - Protect others around you: Learn how to safely use, store and throw away your medicines at www.disposemymeds.org.          This list is accurate as of 9/10/18  2:48 PM.  Always use your most recent med list.                   Brand Name Dispense Instructions for use Diagnosis    * albuterol (2.5 MG/3ML) 0.083% neb solution     360 mL    Take 1 vial (2.5 mg) by nebulization every 2 hours as needed for shortness of breath / dyspnea    COPD exacerbation (H)       * albuterol 108 (90 Base) MCG/ACT inhaler    PROAIR HFA    1 Inhaler    Inhale 1-2 puffs into the lungs every 4 hours as needed for shortness of breath / dyspnea    Pulmonary emphysema, unspecified emphysema type (H)       aspirin 325 MG tablet     120 tablet    Take 1 tablet (325 mg) by mouth daily    S/P ablation of atrial flutter       fluticasone-salmeterol 500-50 MCG/DOSE diskus inhaler    ADVAIR    1 Inhaler    Inhale 1 puff into the lungs 2 times daily    Pulmonary emphysema, unspecified emphysema type (H)       IBUPROFEN PO      Take 200 mg by mouth 4 times daily as needed (aches and pain)        tiotropium 18 MCG capsule    SPIRIVA HANDIHALER    30 capsule    Inhale 1 capsule (18 mcg) into the lungs daily    Pulmonary emphysema, unspecified emphysema type (H)       * Notice:  This list has 2 medication(s) that are the same as other medications prescribed for you. Read the directions carefully, and ask your doctor or other care provider to review them with you.

## 2018-10-10 ENCOUNTER — HOSPITAL ENCOUNTER (OUTPATIENT)
Dept: CT IMAGING | Facility: CLINIC | Age: 73
Discharge: HOME | End: 2018-10-10
Attending: INTERNAL MEDICINE | Admitting: INTERNAL MEDICINE
Payer: COMMERCIAL

## 2018-10-10 DIAGNOSIS — R91.1 LUNG NODULE: ICD-10-CM

## 2018-10-10 PROCEDURE — 71250 CT THORAX DX C-: CPT

## 2019-05-13 ENCOUNTER — OFFICE VISIT (OUTPATIENT)
Dept: INTERNAL MEDICINE | Facility: CLINIC | Age: 74
End: 2019-05-13
Payer: COMMERCIAL

## 2019-05-13 VITALS
WEIGHT: 159.5 LBS | HEIGHT: 71 IN | OXYGEN SATURATION: 97 % | DIASTOLIC BLOOD PRESSURE: 74 MMHG | HEART RATE: 80 BPM | BODY MASS INDEX: 22.33 KG/M2 | RESPIRATION RATE: 14 BRPM | TEMPERATURE: 98.9 F | SYSTOLIC BLOOD PRESSURE: 126 MMHG

## 2019-05-13 DIAGNOSIS — J43.1 PANLOBULAR EMPHYSEMA (H): Primary | ICD-10-CM

## 2019-05-13 PROCEDURE — 99213 OFFICE O/P EST LOW 20 MIN: CPT | Performed by: INTERNAL MEDICINE

## 2019-05-13 ASSESSMENT — MIFFLIN-ST. JEOR: SCORE: 1480.62

## 2019-05-13 NOTE — PROGRESS NOTES
"  SUBJECTIVE:   Flavia Luu is a 73 year old male who presents to clinic today for the following   health issues:      COPD Follow-Up    Symptoms are currently: stable    Current fatigue or dyspnea with ambulation: stable     Shortness of breath: stable    Increased or change in Cough/Sputum: No    Fever(s): No    Baseline ambulation without stopping to rest:  <1  blocks. Able to walk up <1  flights of stairs without stopping to rest.    Any ER/UC or hospital admissions since your last visit? No       Patient continues to use his oxygen with any ambulation and on a as needed basis at rest.      Off O2 is relatively persistent and consistent desaturations with any ambulation.    He tries to keep his oxygen between 90 to 92% but not much more.      History   Smoking Status     Former Smoker     Packs/day: 1.00     Years: 43.00     Types: Cigarettes     Quit date: 12/8/2008   Smokeless Tobacco     Never Used     No results found for: FEV1, [id number]    Additional history: as documented    Reviewed  and updated as needed this visit by clinical staff  Tobacco  Allergies  Meds  Med Hx  Surg Hx  Fam Hx  Soc Hx        Reviewed and updated as needed this visit by Provider         Labs reviewed in EPIC    ROS:  Constitutional, HEENT, cardiovascular, pulmonary, gi and gu systems are negative, except as otherwise noted.    OBJECTIVE:                                                    /74   Pulse 80   Temp 98.9  F (37.2  C) (Temporal)   Resp 14   Ht 1.803 m (5' 11\")   Wt 72.3 kg (159 lb 8 oz)   SpO2 97%   BMI 22.25 kg/m    Body mass index is 22.25 kg/m .  GENERAL APPEARANCE: alert and no distress  HENT: nose and mouth without ulcers or lesions  NECK: no adenopathy, no asymmetry, masses, or scars and thyroid normal to palpation  RESP: Diminished breath sounds bilaterally with ventilatory wheezing  CV: regular rates and rhythm, normal S1 S2, no S3 or S4 and no murmur, click or rub    Diagnostic test " results:  none      ASSESSMENT/PLAN:                                                    1.  Panlobular emphysema (H)  Recertification for home oxygen needed.  Patient requires oxygen with exertion.  Is stable on this and should continue the use of this.  Overall disease state is relatively stable.  We did discuss having him see pulmonary to discuss other options, pharmacological as well as procedurally to help with symptoms.  At this time he is not interested  Immunizations are up-to-date      Filemon Fletcher MD  [facility]

## 2019-07-30 ENCOUNTER — MEDICAL CORRESPONDENCE (OUTPATIENT)
Dept: HEALTH INFORMATION MANAGEMENT | Facility: CLINIC | Age: 74
End: 2019-07-30

## 2019-09-09 ENCOUNTER — OFFICE VISIT (OUTPATIENT)
Dept: INTERNAL MEDICINE | Facility: CLINIC | Age: 74
End: 2019-09-09
Payer: COMMERCIAL

## 2019-09-09 VITALS
OXYGEN SATURATION: 95 % | TEMPERATURE: 98.2 F | HEART RATE: 88 BPM | WEIGHT: 159.2 LBS | DIASTOLIC BLOOD PRESSURE: 80 MMHG | SYSTOLIC BLOOD PRESSURE: 138 MMHG | RESPIRATION RATE: 18 BRPM | BODY MASS INDEX: 22.2 KG/M2

## 2019-09-09 DIAGNOSIS — J43.9 PULMONARY EMPHYSEMA, UNSPECIFIED EMPHYSEMA TYPE (H): ICD-10-CM

## 2019-09-09 DIAGNOSIS — J44.1 COPD EXACERBATION (H): Primary | ICD-10-CM

## 2019-09-09 PROCEDURE — 99214 OFFICE O/P EST MOD 30 MIN: CPT | Performed by: INTERNAL MEDICINE

## 2019-09-09 PROCEDURE — 99207 C PAF COMPLETED  NO CHARGE: CPT | Mod: 25 | Performed by: INTERNAL MEDICINE

## 2019-09-09 RX ORDER — ALBUTEROL SULFATE 90 UG/1
1-2 AEROSOL, METERED RESPIRATORY (INHALATION) EVERY 4 HOURS PRN
Qty: 1 INHALER | Refills: 11 | Status: SHIPPED | OUTPATIENT
Start: 2019-09-09 | End: 2020-09-08

## 2019-09-09 RX ORDER — TIOTROPIUM BROMIDE 18 UG/1
18 CAPSULE ORAL; RESPIRATORY (INHALATION) DAILY
Qty: 30 CAPSULE | Refills: 11 | Status: SHIPPED | OUTPATIENT
Start: 2019-09-09 | End: 2020-09-08

## 2019-09-09 RX ORDER — ALBUTEROL SULFATE 0.83 MG/ML
2.5 SOLUTION RESPIRATORY (INHALATION)
Qty: 360 ML | Refills: 3 | Status: SHIPPED | OUTPATIENT
Start: 2019-09-09 | End: 2020-09-08

## 2019-09-09 NOTE — PROGRESS NOTES
Subjective     Flavia Luu is a 74 year old male who presents to clinic today for the following health issues:    HPI   COPD Follow-Up    Overall, how are your COPD symptoms since your last clinic visit?  Slightly worse    How much fatigue or shortness of breath do you have when you are walking?  More than usual    How much shortness of breath do you have when you are resting?  Same as usual    How often do you cough? Sometimes    Have you noticed any change in your sputum/phlegm?  Yes- thick     Have you experienced a recent fever? No    Please describe how far you can walk without stopping to rest:  The length of 3-5 rooms    How many flights of stairs are you able to walk up without stopping?  1    Have you had any Emergency Room Visits, Urgent Care Visits, or Hospital Admissions because of your COPD since your last office visit?  No    History   Smoking Status     Former Smoker     Packs/day: 1.00     Years: 43.00     Types: Cigarettes     Quit date: 12/8/2008   Smokeless Tobacco     Never Used         How many servings of fruits and vegetables do you eat daily?  2-3    On average, how many sweetened beverages do you drink each day (soda, juice, sweet tea, etc)?   0    How many days per week do you miss taking your medication? 0      RESPIRATORY SYMPTOMS      Duration: 1 week     Description  nasal congestion, rhinorrhea, facial pain/pressure, cough, wheezing and headache    Severity: moderate    Accompanying signs and symptoms: None    History (predisposing factors):  COPD    Precipitating or alleviating factors: None    Therapies tried and outcome:  Nyquil, helped with sleeping      -coughing up more phlegm  -phlegm thicker and clear                Reviewed and updated as needed this visit by Provider         Review of Systems   ROS COMP: Constitutional, HEENT, cardiovascular, pulmonary, gi and gu systems are negative, except as otherwise noted.      Objective    /80   Pulse 88   Temp 98.2  F (36.8   C) (Oral)   Resp 18   Wt 72.2 kg (159 lb 3.2 oz)   SpO2 95%   BMI 22.20 kg/m    Body mass index is 22.2 kg/m .  Physical Exam   EYES: Eyes grossly normal to inspection, PERRL and conjunctivae and sclerae normal  HENT: nose and mouth without ulcers or lesions, oropharynx clear and oral mucous membranes moist  NECK: no adenopathy, no asymmetry, masses, or scars and thyroid normal to palpation  RESP: Greatly diminished breath sounds bilaterally with rhonchi.  PSYCH: mentation appears normal, affect normal/bright    none         Assessment & Plan     1. COPD exacerbation (H)  Given increase in dyspnea, the amount of sputum will treat empirically with an antibiotic.  If it does not respond to treatment within 48 hours we will add steroids  - amoxicillin-clavulanate (AUGMENTIN) 875-125 MG tablet; Take 1 tablet by mouth 2 times daily  Dispense: 14 tablet; Refill: 0    2. Pulmonary emphysema, unspecified emphysema type (H)  - albuterol (PROAIR HFA) 108 (90 Base) MCG/ACT inhaler; Inhale 1-2 puffs into the lungs every 4 hours as needed for shortness of breath / dyspnea  Dispense: 1 Inhaler; Refill: 11  - fluticasone-salmeterol (ADVAIR) 500-50 MCG/DOSE inhaler; Inhale 1 puff into the lungs 2 times daily  Dispense: 1 Inhaler; Refill: 11  - tiotropium (SPIRIVA HANDIHALER) 18 MCG inhaled capsule; Inhale 1 capsule (18 mcg) into the lungs daily  Dispense: 30 capsule; Refill: 11     No follow-ups on file.    Filemon Fletcher MD  [facility]

## 2020-09-08 ENCOUNTER — OFFICE VISIT (OUTPATIENT)
Dept: INTERNAL MEDICINE | Facility: CLINIC | Age: 75
End: 2020-09-08
Payer: COMMERCIAL

## 2020-09-08 VITALS
SYSTOLIC BLOOD PRESSURE: 138 MMHG | BODY MASS INDEX: 22.01 KG/M2 | OXYGEN SATURATION: 97 % | WEIGHT: 157.2 LBS | HEART RATE: 96 BPM | DIASTOLIC BLOOD PRESSURE: 86 MMHG | HEIGHT: 71 IN | TEMPERATURE: 97.5 F

## 2020-09-08 DIAGNOSIS — J43.1 PANLOBULAR EMPHYSEMA (H): Primary | ICD-10-CM

## 2020-09-08 DIAGNOSIS — Z23 FLU VACCINE NEED: ICD-10-CM

## 2020-09-08 PROCEDURE — G0008 ADMIN INFLUENZA VIRUS VAC: HCPCS | Performed by: INTERNAL MEDICINE

## 2020-09-08 PROCEDURE — 90662 IIV NO PRSV INCREASED AG IM: CPT | Performed by: INTERNAL MEDICINE

## 2020-09-08 PROCEDURE — 99214 OFFICE O/P EST MOD 30 MIN: CPT | Mod: 25 | Performed by: INTERNAL MEDICINE

## 2020-09-08 RX ORDER — ALBUTEROL SULFATE 90 UG/1
1-2 AEROSOL, METERED RESPIRATORY (INHALATION) EVERY 4 HOURS PRN
Qty: 1 INHALER | Refills: 11 | Status: SHIPPED | OUTPATIENT
Start: 2020-09-08

## 2020-09-08 RX ORDER — ALBUTEROL SULFATE 0.83 MG/ML
2.5 SOLUTION RESPIRATORY (INHALATION)
Qty: 2 BOX | Refills: 11 | Status: SHIPPED | OUTPATIENT
Start: 2020-09-08

## 2020-09-08 RX ORDER — TIOTROPIUM BROMIDE 18 UG/1
18 CAPSULE ORAL; RESPIRATORY (INHALATION) DAILY
Qty: 30 CAPSULE | Refills: 11 | Status: ON HOLD | OUTPATIENT
Start: 2020-09-08 | End: 2021-09-01

## 2020-09-08 ASSESSMENT — MIFFLIN-ST. JEOR: SCORE: 1460.18

## 2020-09-08 NOTE — PROGRESS NOTES
"Subjective     Flavia Luu is a 75 year old male who presents to clinic today for the following health issues:    HPI       COPD Follow-Up    Overall, how are your COPD symptoms since your last clinic visit?  Slightly worse    How much fatigue or shortness of breath do you have when you are walking?  Same as usual    How much shortness of breath do you have when you are resting?  Same as usual    How often do you cough? Rarely once a day    Have you noticed any change in your sputum/phlegm?  No    Have you experienced a recent fever? No    Please describe how far you can walk without stopping to rest:  The length of 3-5 rooms    How many flights of stairs are you able to walk up without stopping?  1    Have you had any Emergency Room Visits, Urgent Care Visits, or Hospital Admissions because of your COPD since your last office visit?  No    History   Smoking Status     Former Smoker     Packs/day: 1.00     Years: 43.00     Types: Cigarettes     Quit date: 12/8/2008   Smokeless Tobacco     Never Used     No results found for: FEV1, [id number]    Review of Systems   Constitutional, HEENT, cardiovascular, pulmonary, gi and gu systems are negative, except as otherwise noted.      Objective    /86   Pulse 96   Temp 97.5  F (36.4  C) (Temporal)   Ht 1.803 m (5' 11\")   Wt 71.3 kg (157 lb 3.2 oz)   SpO2 97%   BMI 21.92 kg/m    Body mass index is 21.92 kg/m .  Physical Exam   GENERAL APPEARANCE: alert, Nourishment slim  and fatigued  HENT: nose and mouth without ulcers or lesions and normal cephalic/atraumatic  NECK: no adenopathy  RESP: greatly diminished BS posteriorly. Anteriorly a bit better. CTA bilat.             Assessment & Plan     Panlobular emphysema (H)  Pretty stable - really doing ok despite severity of his disease.   Minimize any in -office visits this year.  Has home oximeter, o2, bp machine    - tiotropium (SPIRIVA HANDIHALER) 18 MCG inhaled capsule; Inhale 1 capsule (18 mcg) into the lungs " daily  - fluticasone-salmeterol (ADVAIR) 500-50 MCG/DOSE inhaler; Inhale 1 puff into the lungs 2 times daily  - albuterol (PROAIR HFA) 108 (90 Base) MCG/ACT inhaler; Inhale 1-2 puffs into the lungs every 4 hours as needed for shortness of breath / dyspnea  - albuterol (PROVENTIL) (2.5 MG/3ML) 0.083% neb solution; Take 1 vial (2.5 mg) by nebulization every 2 hours as needed for shortness of breath / dyspnea    Flu vaccine need  - HC FLU VACCINE, INCREASED ANTIGEN, PRESV FREE  - ADMIN 1st VACCINE           Return in about 6 months (around 3/8/2021) for Pulmonary/Lung follow up.    Filemon Flethcer MD  [facility]

## 2020-09-28 ENCOUNTER — VIRTUAL VISIT (OUTPATIENT)
Dept: INTERNAL MEDICINE | Facility: CLINIC | Age: 75
End: 2020-09-28
Payer: COMMERCIAL

## 2020-09-28 VITALS — OXYGEN SATURATION: 95 % | TEMPERATURE: 98.3 F

## 2020-09-28 DIAGNOSIS — J44.1 COPD EXACERBATION (H): Primary | ICD-10-CM

## 2020-09-28 PROCEDURE — 99442 ZZC PHYSICIAN TELEPHONE EVALUATION 11-20 MIN: CPT | Mod: 95 | Performed by: INTERNAL MEDICINE

## 2020-09-28 RX ORDER — PREDNISONE 20 MG/1
TABLET ORAL
Qty: 17 TABLET | Refills: 0 | Status: SHIPPED | OUTPATIENT
Start: 2020-09-28 | End: 2020-10-08

## 2020-09-28 ASSESSMENT — ENCOUNTER SYMPTOMS
EYES NEGATIVE: 1
CONSTITUTIONAL NEGATIVE: 1
GASTROINTESTINAL NEGATIVE: 1
SHORTNESS OF BREATH: 1
COUGH: 1

## 2020-09-28 NOTE — PROGRESS NOTES
"Flavia Luu is a 75 year old male who is being evaluated via a billable telephone visit.      The patient has been notified of following:     \"This telephone visit will be conducted via a call between you and your physician/provider. We have found that certain health care needs can be provided without the need for a physical exam.  This service lets us provide the care you need with a short phone conversation.  If a prescription is necessary we can send it directly to your pharmacy.  If lab work is needed we can place an order for that and you can then stop by our lab to have the test done at a later time.    Telephone visits are billed at different rates depending on your insurance coverage. During this emergency period, for some insurers they may be billed the same as an in-person visit.  Please reach out to your insurance provider with any questions.    If during the course of the call the physician/provider feels a telephone visit is not appropriate, you will not be charged for this service.\"    Patient has given verbal consent for Telephone visit?  Yes    What phone number would you like to be contacted at? [phone]    How would you like to obtain your AVS? Mail a copy    Subjective     Flavia Luu is a 75 year old male who presents via phone visit today for the following health issues:    HPI    Pt calling to discuss worsening emphysema.  States O2 today is 95 today, pt does use oxygen at home. Started worsening [place] weekend [place] cabin.  See review of systems        Review of Systems   Constitutional: Negative.    HENT: Negative.    Eyes: Negative.    Respiratory: Positive for cough and shortness of breath.         Limited to HENRIQUEZ   Gastrointestinal: Negative.            Objective        Vitals:  Temp 98.3  F (36.8  C)   SpO2 95%  .  Alert.  dyspneic w/talking- not unusual for him  PSYCH: Alert and oriented times 3; coherent speech, normal   rate and volume, able to articulate logical thoughts, " able   to abstract reason, no tangential thoughts, no hallucinations   or delusions  His affect is normal  RESP: No cough, no audible wheezing, able to talk in full sentences  Remainder of exam unable to be completed due to telephone visits        Assessment & Plan     COPD exacerbation (H)  SX TYPICAL OF PREVIOUS FLARES- W/LACK OF ANY SIGNIFICANT SPUTUM WILL NOT RX ANTIBIOTICS.   - predniSONE (DELTASONE) 20 MG tablet; Take 2 tablets (40 mg) by mouth daily for 7 days, THEN 1 tablet (20 mg) daily for 3 days.  Lesions    See Patient Instructions    No follow-ups on file.    Filemon Fletcher MD  [facility]    Phone call duration:  12 minutes

## 2021-02-25 ENCOUNTER — IMMUNIZATION (OUTPATIENT)
Dept: NURSING | Facility: CLINIC | Age: 76
End: 2021-02-25
Payer: COMMERCIAL

## 2021-02-25 PROCEDURE — 91300 PR COVID VAC PFIZER DIL RECON 30 MCG/0.3 ML IM: CPT

## 2021-02-25 PROCEDURE — 0001A PR COVID VAC PFIZER DIL RECON 30 MCG/0.3 ML IM: CPT

## 2021-03-18 ENCOUNTER — IMMUNIZATION (OUTPATIENT)
Dept: NURSING | Facility: CLINIC | Age: 76
End: 2021-03-18
Attending: INTERNAL MEDICINE
Payer: COMMERCIAL

## 2021-03-18 PROCEDURE — 0002A PR COVID VAC PFIZER DIL RECON 30 MCG/0.3 ML IM: CPT

## 2021-03-18 PROCEDURE — 91300 PR COVID VAC PFIZER DIL RECON 30 MCG/0.3 ML IM: CPT

## 2021-05-25 ENCOUNTER — VIRTUAL VISIT (OUTPATIENT)
Dept: INTERNAL MEDICINE | Facility: CLINIC | Age: 76
End: 2021-05-25
Payer: COMMERCIAL

## 2021-05-25 DIAGNOSIS — J43.1 PANLOBULAR EMPHYSEMA (H): Primary | ICD-10-CM

## 2021-05-25 PROCEDURE — 99214 OFFICE O/P EST MOD 30 MIN: CPT | Mod: 95 | Performed by: INTERNAL MEDICINE

## 2021-05-25 RX ORDER — ROFLUMILAST 250 UG/1
250 TABLET ORAL DAILY
Qty: 30 TABLET | Refills: 0 | Status: SHIPPED | OUTPATIENT
Start: 2021-05-25 | End: 2021-07-06

## 2021-05-25 NOTE — PROGRESS NOTES
Flavia is a 75 year old who is being evaluated via a billable telephone visit.      What phone number would you like to be contacted at? [phone]  How would you like to obtain your AVS? MyChart    Assessment & Plan     Panlobular emphysema (H)  Oxygen dependent continue to use O2.  No need for treatment of any acute flare but do feel his COPD seems to be worsening.  We discussed options such as q. OD macrolide dosing but this would require him to come in and have a EKG first for monitoring of prolonged QT intervals.  Another option would be starting Daliresp.  We discussed the potential side effects and is willing to try this to see if it helps.  Initial month would be 250 mcg follow-up after that as we would increase to 500 mcg daily- roflumilast (DALIRESP) 250 MCG TABS tablet; Take 1 tablet (250 mcg) by mouth daily             See Patient Instructions    Return in about 4 weeks (around 6/22/2021) for Via Tele Visit.    Filemon Fletcher MD  [facility]   Flavia is a 75 year old who presents for the following health issues     HPI     COPD Follow-Up    Overall, how are your COPD symptoms since your last clinic visit?  Slightly worse    How much fatigue or shortness of breath do you have when you are walking?  More than usual    How much shortness of breath do you have when you are resting?  Same as usual    How often do you cough? Rarely    Have you noticed any change in your sputum/phlegm?  No    Have you experienced a recent fever? No    Please describe how far you can walk without stopping to rest:  Less than 1 block    How many flights of stairs are you able to walk up without stopping?  None    Have you had any Emergency Room Visits, Urgent Care Visits, or Hospital Admissions because of your COPD since your last office visit?  No    History   Smoking Status     Former Smoker     Packs/day: 1.00     Years: 43.00     Types: Cigarettes     Quit date: 12/8/2008    Smokeless Tobacco     Never Used     Patient with oxygen dependent COPD.  He has noticed gradual worsening in his symptoms.  He averages several flares a year which typically are treated with short course of antibiotic plus minus prednisone.  He now notes not so much flare but gradually worsening dyspnea more on a regular basis.  The PFTs were done in 2003 showing moderate COPD.  Due to cost these have not been repeated but would be significantly worse at this point.        Review of Systems         Objective       Vitals:  No vitals were obtained today due to virtual visit.    Physical Exam   alert and no distress  PSYCH: Alert and oriented times 3; coherent speech, normal   rate and volume, able to articulate logical thoughts, able   to abstract reason, no tangential thoughts, no hallucinations   or delusions  His affect is   RESP: Talks in short sentences.  This is typical due to his dyspnea.              Phone call duration: 16 minutes

## 2021-05-26 ENCOUNTER — TELEPHONE (OUTPATIENT)
Dept: INTERNAL MEDICINE | Facility: CLINIC | Age: 76
End: 2021-05-26

## 2021-05-26 NOTE — TELEPHONE ENCOUNTER
PA - Auth for Roflumilast - approved. Case # [id number] dates: 4/25/2021- 5/26/2022 . Pt and prescriber will recieve notification .Sharon Cohen RN

## 2021-06-13 ENCOUNTER — APPOINTMENT (OUTPATIENT)
Dept: GENERAL RADIOLOGY | Facility: CLINIC | Age: 76
DRG: 199 | End: 2021-06-13
Attending: HOSPITALIST
Payer: COMMERCIAL

## 2021-06-13 ENCOUNTER — HOSPITAL ENCOUNTER (INPATIENT)
Facility: CLINIC | Age: 76
LOS: 7 days | Discharge: NURSING, REHAB OR LONG-TERM CARE | DRG: 199 | End: 2021-06-20
Attending: EMERGENCY MEDICINE | Admitting: INTERNAL MEDICINE
Payer: COMMERCIAL

## 2021-06-13 ENCOUNTER — APPOINTMENT (OUTPATIENT)
Dept: GENERAL RADIOLOGY | Facility: CLINIC | Age: 76
DRG: 199 | End: 2021-06-13
Attending: EMERGENCY MEDICINE
Payer: COMMERCIAL

## 2021-06-13 ENCOUNTER — APPOINTMENT (OUTPATIENT)
Dept: CT IMAGING | Facility: CLINIC | Age: 76
DRG: 199 | End: 2021-06-13
Attending: EMERGENCY MEDICINE
Payer: COMMERCIAL

## 2021-06-13 DIAGNOSIS — I48.91 ATRIAL FIBRILLATION, UNSPECIFIED TYPE (H): Primary | ICD-10-CM

## 2021-06-13 DIAGNOSIS — J93.9 PNEUMOTHORAX ON RIGHT: ICD-10-CM

## 2021-06-13 DIAGNOSIS — R06.89 HYPERCARBIA: ICD-10-CM

## 2021-06-13 DIAGNOSIS — R05.9 COUGH: ICD-10-CM

## 2021-06-13 DIAGNOSIS — G47.00 INSOMNIA, UNSPECIFIED TYPE: ICD-10-CM

## 2021-06-13 DIAGNOSIS — H04.123 DRY EYES: ICD-10-CM

## 2021-06-13 DIAGNOSIS — R09.02 HYPOXEMIA: ICD-10-CM

## 2021-06-13 DIAGNOSIS — J44.1 COPD EXACERBATION (H): ICD-10-CM

## 2021-06-13 LAB
ALBUMIN SERPL-MCNC: 3.7 G/DL (ref 3.4–5)
ALP SERPL-CCNC: 77 U/L (ref 40–150)
ALT SERPL W P-5'-P-CCNC: 18 U/L (ref 0–70)
ANION GAP SERPL CALCULATED.3IONS-SCNC: 2 MMOL/L (ref 3–14)
AST SERPL W P-5'-P-CCNC: 16 U/L (ref 0–45)
BASE EXCESS BLDV CALC-SCNC: 1 MMOL/L
BASE EXCESS BLDV CALC-SCNC: 4 MMOL/L
BASOPHILS # BLD AUTO: 0.1 10E9/L (ref 0–0.2)
BASOPHILS NFR BLD AUTO: 0.9 %
BILIRUB SERPL-MCNC: 0.4 MG/DL (ref 0.2–1.3)
BUN SERPL-MCNC: 21 MG/DL (ref 7–30)
CALCIUM SERPL-MCNC: 9.1 MG/DL (ref 8.5–10.1)
CHLORIDE SERPL-SCNC: 100 MMOL/L (ref 94–109)
CO2 BLDCOV-SCNC: 33 MMOL/L (ref 21–28)
CO2 BLDCOV-SCNC: 35 MMOL/L (ref 21–28)
CO2 SERPL-SCNC: 33 MMOL/L (ref 20–32)
CREAT SERPL-MCNC: 0.77 MG/DL (ref 0.66–1.25)
DIFFERENTIAL METHOD BLD: ABNORMAL
EOSINOPHIL # BLD AUTO: 0 10E9/L (ref 0–0.7)
EOSINOPHIL NFR BLD AUTO: 0 %
ERYTHROCYTE [DISTWIDTH] IN BLOOD BY AUTOMATED COUNT: 12.8 % (ref 10–15)
GFR SERPL CREATININE-BSD FRML MDRD: 87 ML/MIN/{1.73_M2}
GLUCOSE SERPL-MCNC: 112 MG/DL (ref 70–99)
HCO3 BLDV-SCNC: 32 MMOL/L (ref 21–28)
HCO3 BLDV-SCNC: 34 MMOL/L (ref 21–28)
HCT VFR BLD AUTO: 44.8 % (ref 40–53)
HGB BLD-MCNC: 13.7 G/DL (ref 13.3–17.7)
IMM GRANULOCYTES # BLD: 0.1 10E9/L (ref 0–0.4)
IMM GRANULOCYTES NFR BLD: 0.8 %
LABORATORY COMMENT REPORT: NORMAL
LACTATE BLD-SCNC: 0.7 MMOL/L (ref 0.7–2.1)
LACTATE BLD-SCNC: 0.9 MMOL/L (ref 0.7–2.1)
LYMPHOCYTES # BLD AUTO: 1.6 10E9/L (ref 0.8–5.3)
LYMPHOCYTES NFR BLD AUTO: 19.5 %
MCH RBC QN AUTO: 31 PG (ref 26.5–33)
MCHC RBC AUTO-ENTMCNC: 30.6 G/DL (ref 31.5–36.5)
MCV RBC AUTO: 101 FL (ref 78–100)
MONOCYTES # BLD AUTO: 1.2 10E9/L (ref 0–1.3)
MONOCYTES NFR BLD AUTO: 15.4 %
NEUTROPHILS # BLD AUTO: 5 10E9/L (ref 1.6–8.3)
NEUTROPHILS NFR BLD AUTO: 63.4 %
NRBC # BLD AUTO: 0 10*3/UL
NRBC BLD AUTO-RTO: 0 /100
NT-PROBNP SERPL-MCNC: 189 PG/ML (ref 0–1800)
O2/TOTAL GAS SETTING VFR VENT: 4 %
OXYHGB MFR BLDV: 90 %
OXYHGB MFR BLDV: 93 %
PCO2 BLDV: 77 MM HG (ref 40–50)
PCO2 BLDV: 83 MM HG (ref 40–50)
PCO2 BLDV: 84 MM HG (ref 40–50)
PCO2 BLDV: 88 MM HG (ref 40–50)
PH BLDV: 7.2 PH (ref 7.32–7.43)
PH BLDV: 7.2 PH (ref 7.32–7.43)
PH BLDV: 7.21 PH (ref 7.32–7.43)
PH BLDV: 7.25 PH (ref 7.32–7.43)
PLATELET # BLD AUTO: 315 10E9/L (ref 150–450)
PO2 BLDV: 37 MM HG (ref 25–47)
PO2 BLDV: 62 MM HG (ref 25–47)
PO2 BLDV: 66 MM HG (ref 25–47)
PO2 BLDV: 83 MM HG (ref 25–47)
POTASSIUM SERPL-SCNC: 4.6 MMOL/L (ref 3.4–5.3)
PROT SERPL-MCNC: 7.4 G/DL (ref 6.8–8.8)
RBC # BLD AUTO: 4.42 10E12/L (ref 4.4–5.9)
SAO2 % BLDV FROM PO2: 55 %
SAO2 % BLDV FROM PO2: 86 %
SARS-COV-2 RNA RESP QL NAA+PROBE: NEGATIVE
SODIUM SERPL-SCNC: 135 MMOL/L (ref 133–144)
SPECIMEN SOURCE: NORMAL
TROPONIN I SERPL-MCNC: <0.015 UG/L (ref 0–0.04)
WBC # BLD AUTO: 7.9 10E9/L (ref 4–11)

## 2021-06-13 PROCEDURE — 999N000105 HC STATISTIC NO DOCUMENTATION TO SUPPORT CHARGE

## 2021-06-13 PROCEDURE — 36415 COLL VENOUS BLD VENIPUNCTURE: CPT | Performed by: HOSPITALIST

## 2021-06-13 PROCEDURE — 80053 COMPREHEN METABOLIC PANEL: CPT | Performed by: EMERGENCY MEDICINE

## 2021-06-13 PROCEDURE — 83605 ASSAY OF LACTIC ACID: CPT

## 2021-06-13 PROCEDURE — 36415 COLL VENOUS BLD VENIPUNCTURE: CPT | Performed by: INTERNAL MEDICINE

## 2021-06-13 PROCEDURE — 96374 THER/PROPH/DIAG INJ IV PUSH: CPT | Mod: 59

## 2021-06-13 PROCEDURE — 94640 AIRWAY INHALATION TREATMENT: CPT

## 2021-06-13 PROCEDURE — 82805 BLOOD GASES W/O2 SATURATION: CPT | Performed by: HOSPITALIST

## 2021-06-13 PROCEDURE — 71045 X-RAY EXAM CHEST 1 VIEW: CPT | Mod: 76

## 2021-06-13 PROCEDURE — 94640 AIRWAY INHALATION TREATMENT: CPT | Mod: 76

## 2021-06-13 PROCEDURE — 71250 CT THORAX DX C-: CPT

## 2021-06-13 PROCEDURE — 250N000013 HC RX MED GY IP 250 OP 250 PS 637: Performed by: INTERNAL MEDICINE

## 2021-06-13 PROCEDURE — 250N000009 HC RX 250

## 2021-06-13 PROCEDURE — C9803 HOPD COVID-19 SPEC COLLECT: HCPCS

## 2021-06-13 PROCEDURE — 120N000013 HC R&B IMCU

## 2021-06-13 PROCEDURE — 250N000009 HC RX 250: Performed by: EMERGENCY MEDICINE

## 2021-06-13 PROCEDURE — 85025 COMPLETE CBC W/AUTO DIFF WBC: CPT | Performed by: EMERGENCY MEDICINE

## 2021-06-13 PROCEDURE — 120N000001 HC R&B MED SURG/OB

## 2021-06-13 PROCEDURE — 96361 HYDRATE IV INFUSION ADD-ON: CPT

## 2021-06-13 PROCEDURE — 84484 ASSAY OF TROPONIN QUANT: CPT | Performed by: EMERGENCY MEDICINE

## 2021-06-13 PROCEDURE — 258N000003 HC RX IP 258 OP 636: Performed by: EMERGENCY MEDICINE

## 2021-06-13 PROCEDURE — 93005 ELECTROCARDIOGRAM TRACING: CPT

## 2021-06-13 PROCEDURE — 258N000003 HC RX IP 258 OP 636: Performed by: HOSPITALIST

## 2021-06-13 PROCEDURE — 82803 BLOOD GASES ANY COMBINATION: CPT

## 2021-06-13 PROCEDURE — 82805 BLOOD GASES W/O2 SATURATION: CPT | Performed by: INTERNAL MEDICINE

## 2021-06-13 PROCEDURE — 250N000011 HC RX IP 250 OP 636: Performed by: EMERGENCY MEDICINE

## 2021-06-13 PROCEDURE — 83880 ASSAY OF NATRIURETIC PEPTIDE: CPT | Performed by: EMERGENCY MEDICINE

## 2021-06-13 PROCEDURE — 96375 TX/PRO/DX INJ NEW DRUG ADDON: CPT

## 2021-06-13 PROCEDURE — 71045 X-RAY EXAM CHEST 1 VIEW: CPT

## 2021-06-13 PROCEDURE — 250N000009 HC RX 250: Performed by: HOSPITALIST

## 2021-06-13 PROCEDURE — 99285 EMERGENCY DEPT VISIT HI MDM: CPT | Mod: 25

## 2021-06-13 PROCEDURE — 87635 SARS-COV-2 COVID-19 AMP PRB: CPT | Performed by: EMERGENCY MEDICINE

## 2021-06-13 PROCEDURE — 5A09357 ASSISTANCE WITH RESPIRATORY VENTILATION, LESS THAN 24 CONSECUTIVE HOURS, CONTINUOUS POSITIVE AIRWAY PRESSURE: ICD-10-PCS | Performed by: EMERGENCY MEDICINE

## 2021-06-13 PROCEDURE — 99223 1ST HOSP IP/OBS HIGH 75: CPT | Mod: AI | Performed by: INTERNAL MEDICINE

## 2021-06-13 PROCEDURE — 250N000011 HC RX IP 250 OP 636: Performed by: HOSPITALIST

## 2021-06-13 PROCEDURE — 71045 X-RAY EXAM CHEST 1 VIEW: CPT | Mod: 77

## 2021-06-13 RX ORDER — ACETAMINOPHEN 325 MG/1
650 TABLET ORAL EVERY 4 HOURS PRN
Status: DISCONTINUED | OUTPATIENT
Start: 2021-06-13 | End: 2021-06-20 | Stop reason: HOSPADM

## 2021-06-13 RX ORDER — IPRATROPIUM BROMIDE AND ALBUTEROL SULFATE 2.5; .5 MG/3ML; MG/3ML
3 SOLUTION RESPIRATORY (INHALATION) ONCE
Status: COMPLETED | OUTPATIENT
Start: 2021-06-13 | End: 2021-06-13

## 2021-06-13 RX ORDER — METHYLPREDNISOLONE SODIUM SUCCINATE 125 MG/2ML
125 INJECTION, POWDER, LYOPHILIZED, FOR SOLUTION INTRAMUSCULAR; INTRAVENOUS ONCE
Status: COMPLETED | OUTPATIENT
Start: 2021-06-13 | End: 2021-06-13

## 2021-06-13 RX ORDER — METHYLPREDNISOLONE SODIUM SUCCINATE 125 MG/2ML
60 INJECTION, POWDER, LYOPHILIZED, FOR SOLUTION INTRAMUSCULAR; INTRAVENOUS EVERY 8 HOURS
Status: DISCONTINUED | OUTPATIENT
Start: 2021-06-13 | End: 2021-06-14

## 2021-06-13 RX ORDER — NALOXONE HYDROCHLORIDE 0.4 MG/ML
0.2 INJECTION, SOLUTION INTRAMUSCULAR; INTRAVENOUS; SUBCUTANEOUS
Status: DISCONTINUED | OUTPATIENT
Start: 2021-06-13 | End: 2021-06-20 | Stop reason: HOSPADM

## 2021-06-13 RX ORDER — HYDROMORPHONE HCL IN WATER/PF 6 MG/30 ML
0.2 PATIENT CONTROLLED ANALGESIA SYRINGE INTRAVENOUS
Status: DISCONTINUED | OUTPATIENT
Start: 2021-06-13 | End: 2021-06-20 | Stop reason: HOSPADM

## 2021-06-13 RX ORDER — ONDANSETRON 4 MG/1
4 TABLET, ORALLY DISINTEGRATING ORAL EVERY 6 HOURS PRN
Status: DISCONTINUED | OUTPATIENT
Start: 2021-06-13 | End: 2021-06-20 | Stop reason: HOSPADM

## 2021-06-13 RX ORDER — LIDOCAINE 40 MG/G
CREAM TOPICAL
Status: DISCONTINUED | OUTPATIENT
Start: 2021-06-13 | End: 2021-06-18

## 2021-06-13 RX ORDER — BISACODYL 10 MG
10 SUPPOSITORY, RECTAL RECTAL DAILY PRN
Status: DISCONTINUED | OUTPATIENT
Start: 2021-06-13 | End: 2021-06-20 | Stop reason: HOSPADM

## 2021-06-13 RX ORDER — ONDANSETRON 2 MG/ML
4 INJECTION INTRAMUSCULAR; INTRAVENOUS EVERY 6 HOURS PRN
Status: DISCONTINUED | OUTPATIENT
Start: 2021-06-13 | End: 2021-06-20 | Stop reason: HOSPADM

## 2021-06-13 RX ORDER — ALBUTEROL SULFATE 0.83 MG/ML
2.5 SOLUTION RESPIRATORY (INHALATION)
Status: DISCONTINUED | OUTPATIENT
Start: 2021-06-13 | End: 2021-06-19

## 2021-06-13 RX ORDER — AMOXICILLIN 250 MG
2 CAPSULE ORAL 2 TIMES DAILY PRN
Status: DISCONTINUED | OUTPATIENT
Start: 2021-06-13 | End: 2021-06-20 | Stop reason: HOSPADM

## 2021-06-13 RX ORDER — LORAZEPAM 2 MG/ML
0.5 INJECTION INTRAMUSCULAR
Status: DISCONTINUED | OUTPATIENT
Start: 2021-06-13 | End: 2021-06-13

## 2021-06-13 RX ORDER — IBUPROFEN 200 MG
200 TABLET ORAL EVERY 6 HOURS PRN
Status: ON HOLD | COMMUNITY
End: 2021-06-20

## 2021-06-13 RX ORDER — IPRATROPIUM BROMIDE AND ALBUTEROL SULFATE 2.5; .5 MG/3ML; MG/3ML
SOLUTION RESPIRATORY (INHALATION)
Status: COMPLETED
Start: 2021-06-13 | End: 2021-06-13

## 2021-06-13 RX ORDER — NITROGLYCERIN 0.4 MG/1
0.4 TABLET SUBLINGUAL EVERY 5 MIN PRN
Status: DISCONTINUED | OUTPATIENT
Start: 2021-06-13 | End: 2021-06-18

## 2021-06-13 RX ORDER — IPRATROPIUM BROMIDE AND ALBUTEROL SULFATE 2.5; .5 MG/3ML; MG/3ML
3 SOLUTION RESPIRATORY (INHALATION) EVERY 4 HOURS PRN
Status: DISCONTINUED | OUTPATIENT
Start: 2021-06-13 | End: 2021-06-15

## 2021-06-13 RX ORDER — SODIUM CHLORIDE 9 MG/ML
INJECTION, SOLUTION INTRAVENOUS CONTINUOUS
Status: ACTIVE | OUTPATIENT
Start: 2021-06-13 | End: 2021-06-13

## 2021-06-13 RX ORDER — ACETAMINOPHEN 650 MG/1
650 SUPPOSITORY RECTAL EVERY 4 HOURS PRN
Status: DISCONTINUED | OUTPATIENT
Start: 2021-06-13 | End: 2021-06-20 | Stop reason: HOSPADM

## 2021-06-13 RX ORDER — AMOXICILLIN 250 MG
1 CAPSULE ORAL 2 TIMES DAILY PRN
Status: DISCONTINUED | OUTPATIENT
Start: 2021-06-13 | End: 2021-06-20 | Stop reason: HOSPADM

## 2021-06-13 RX ORDER — NALOXONE HYDROCHLORIDE 0.4 MG/ML
0.4 INJECTION, SOLUTION INTRAMUSCULAR; INTRAVENOUS; SUBCUTANEOUS
Status: DISCONTINUED | OUTPATIENT
Start: 2021-06-13 | End: 2021-06-20 | Stop reason: HOSPADM

## 2021-06-13 RX ORDER — IPRATROPIUM BROMIDE AND ALBUTEROL SULFATE 2.5; .5 MG/3ML; MG/3ML
3 SOLUTION RESPIRATORY (INHALATION)
Status: COMPLETED | OUTPATIENT
Start: 2021-06-13 | End: 2021-06-13

## 2021-06-13 RX ORDER — PROCHLORPERAZINE 25 MG
12.5 SUPPOSITORY, RECTAL RECTAL EVERY 12 HOURS PRN
Status: DISCONTINUED | OUTPATIENT
Start: 2021-06-13 | End: 2021-06-20 | Stop reason: HOSPADM

## 2021-06-13 RX ORDER — PROCHLORPERAZINE MALEATE 5 MG
5 TABLET ORAL EVERY 6 HOURS PRN
Status: DISCONTINUED | OUTPATIENT
Start: 2021-06-13 | End: 2021-06-20 | Stop reason: HOSPADM

## 2021-06-13 RX ORDER — IPRATROPIUM BROMIDE AND ALBUTEROL SULFATE 2.5; .5 MG/3ML; MG/3ML
3 SOLUTION RESPIRATORY (INHALATION)
Status: DISCONTINUED | OUTPATIENT
Start: 2021-06-13 | End: 2021-06-15

## 2021-06-13 RX ORDER — MORPHINE SULFATE 2 MG/ML
2 INJECTION, SOLUTION INTRAMUSCULAR; INTRAVENOUS ONCE
Status: COMPLETED | OUTPATIENT
Start: 2021-06-13 | End: 2021-06-13

## 2021-06-13 RX ORDER — LIDOCAINE 40 MG/G
CREAM TOPICAL
Status: DISCONTINUED | OUTPATIENT
Start: 2021-06-13 | End: 2021-06-13

## 2021-06-13 RX ADMIN — IPRATROPIUM BROMIDE AND ALBUTEROL SULFATE 3 ML: .5; 3 SOLUTION RESPIRATORY (INHALATION) at 07:40

## 2021-06-13 RX ADMIN — SODIUM CHLORIDE 500 ML: 9 INJECTION, SOLUTION INTRAVENOUS at 04:35

## 2021-06-13 RX ADMIN — METHYLPREDNISOLONE SODIUM SUCCINATE 62.5 MG: 125 INJECTION, POWDER, FOR SOLUTION INTRAMUSCULAR; INTRAVENOUS at 20:03

## 2021-06-13 RX ADMIN — IPRATROPIUM BROMIDE AND ALBUTEROL SULFATE 3 ML: .5; 3 SOLUTION RESPIRATORY (INHALATION) at 03:46

## 2021-06-13 RX ADMIN — LORAZEPAM 0.5 MG: 2 INJECTION INTRAMUSCULAR; INTRAVENOUS at 04:34

## 2021-06-13 RX ADMIN — MORPHINE SULFATE 2 MG: 2 INJECTION, SOLUTION INTRAMUSCULAR; INTRAVENOUS at 04:20

## 2021-06-13 RX ADMIN — IPRATROPIUM BROMIDE AND ALBUTEROL SULFATE 3 ML: .5; 3 SOLUTION RESPIRATORY (INHALATION) at 03:49

## 2021-06-13 RX ADMIN — IPRATROPIUM BROMIDE AND ALBUTEROL SULFATE 3 ML: .5; 3 SOLUTION RESPIRATORY (INHALATION) at 11:51

## 2021-06-13 RX ADMIN — SODIUM CHLORIDE: 9 INJECTION, SOLUTION INTRAVENOUS at 16:35

## 2021-06-13 RX ADMIN — IPRATROPIUM BROMIDE AND ALBUTEROL SULFATE 3 ML: .5; 3 SOLUTION RESPIRATORY (INHALATION) at 15:33

## 2021-06-13 RX ADMIN — IPRATROPIUM BROMIDE AND ALBUTEROL SULFATE 3 ML: 2.5; .5 SOLUTION RESPIRATORY (INHALATION) at 07:40

## 2021-06-13 RX ADMIN — METHYLPREDNISOLONE SODIUM SUCCINATE 125 MG: 125 INJECTION, POWDER, FOR SOLUTION INTRAMUSCULAR; INTRAVENOUS at 03:47

## 2021-06-13 RX ADMIN — IPRATROPIUM BROMIDE AND ALBUTEROL SULFATE 3 ML: .5; 3 SOLUTION RESPIRATORY (INHALATION) at 06:39

## 2021-06-13 RX ADMIN — IPRATROPIUM BROMIDE AND ALBUTEROL SULFATE 3 ML: .5; 3 SOLUTION RESPIRATORY (INHALATION) at 08:42

## 2021-06-13 RX ADMIN — ACETAMINOPHEN 650 MG: 325 TABLET, FILM COATED ORAL at 21:31

## 2021-06-13 RX ADMIN — IPRATROPIUM BROMIDE AND ALBUTEROL SULFATE 3 ML: .5; 3 SOLUTION RESPIRATORY (INHALATION) at 03:52

## 2021-06-13 RX ADMIN — METHYLPREDNISOLONE SODIUM SUCCINATE 62.5 MG: 125 INJECTION, POWDER, FOR SOLUTION INTRAMUSCULAR; INTRAVENOUS at 11:37

## 2021-06-13 RX ADMIN — IPRATROPIUM BROMIDE AND ALBUTEROL SULFATE 3 ML: .5; 3 SOLUTION RESPIRATORY (INHALATION) at 04:30

## 2021-06-13 ASSESSMENT — ACTIVITIES OF DAILY LIVING (ADL)
ADLS_ACUITY_SCORE: 11
DIFFICULTY_COMMUNICATING: NO
DRESSING/BATHING_DIFFICULTY: NO
ADLS_ACUITY_SCORE: 13
ADLS_ACUITY_SCORE: 11
DIFFICULTY_EATING/SWALLOWING: NO
TOILETING_ISSUES: NO

## 2021-06-13 ASSESSMENT — ENCOUNTER SYMPTOMS
FEVER: 0
SHORTNESS OF BREATH: 1
COUGH: 0
CHEST TIGHTNESS: 1
CHILLS: 0

## 2021-06-13 ASSESSMENT — MIFFLIN-ST. JEOR: SCORE: 1475.15

## 2021-06-13 NOTE — ED NOTES
Pt. Dyspneic, denies pain. NC up to 4lpm for comfort and 90% sats. Port xray ordered and in room now. Wife in the room.

## 2021-06-13 NOTE — H&P
Admitted: 06/13/2021    PRIMARY CARE PHYSICIAN:   Filemon Fletcher MD    CODE STATUS:  DNR/DNI, discussed with the patient and his wife at bedside.    CHIEF COMPLAINT:  Shortness of breath.    HISTORY OF PRESENT ILLNESS:  Mr. Luu is a 75-year-old male with past medical history significant for severe oxygen-dependent COPD, atrial flutter status post ablation, who presents to the Emergency Department with the above concerns.  History is obtained through discussion with the ED physician, as well as the patient and his wife at bedside.  The patient states that he typically is short of breath and has noted a little bit worsening over the past couple of weeks, but had a sudden change in his respiratory status, within the past 8-10 hours.  He states that he was doing nothing in particular when he all of a sudden felt more short of breath, but did not have specific sharp chest pains.  He has not had any fevers or chills, nor any cough.  No nausea or vomiting.  He is taking his inhalers on a regular basis and other than that, he does not really take much in the way of medications and is pretty healthy.  The patient presented to the ED here where a chest x-ray showed a small right pneumothorax, which was confirmed on chest CT.  His VBG did show a pH of 7.21 with a pCO2 of 88, pO2 of 37, and bicarbonate of 35.  After a couple of hours, repeated showing stability with pH of 7.2, pCO2 of 84, pO2 of 66, and bicarbonate of 33.  I did discuss the case with the patient and his wife at bedside, as well as Dr. Lang, who came to visit while I was in the room.  We discussed the potential options of chest tube and confirmed that the patient is DNR/DNI.  The patient's hospital course has shown relative stability for the most part.  Shortly before I came into the room, he had changed position because he was uncomfortable, and this did seem to make him more short of breath and decompensate slightly.  The patient also was on BiPAP  briefly before pneumothorax was noted, and he did not tolerate BiPAP very well.    PAST MEDICAL HISTORY:     1.  Severe, oxygen-dependent COPD with 2 liters of nasal cannula at home.  2.  Atrial flutter, status post ablation.    MEDICATIONS:    Medications Prior to Admission   Medication Sig Dispense Refill Last Dose     albuterol (PROAIR HFA) 108 (90 Base) MCG/ACT inhaler Inhale 1-2 puffs into the lungs every 4 hours as needed for shortness of breath / dyspnea 1 Inhaler 11 6/12/2021 at Unknown time     albuterol (PROVENTIL) (2.5 MG/3ML) 0.083% neb solution Take 1 vial (2.5 mg) by nebulization every 2 hours as needed for shortness of breath / dyspnea 2 Box 11 6/12/2021 at Unknown time     fluticasone-salmeterol (ADVAIR) 500-50 MCG/DOSE inhaler Inhale 1 puff into the lungs 2 times daily 1 Inhaler 11 6/12/2021 at Unknown time     ibuprofen (ADVIL/MOTRIN) 200 MG tablet Take 200 mg by mouth every 6 hours as needed for mild pain or moderate pain   6/12/2021 at Unknown time     roflumilast (DALIRESP) 250 MCG TABS tablet Take 1 tablet (250 mcg) by mouth daily 30 tablet 0 6/12/2021 at Unknown time     tiotropium (SPIRIVA HANDIHALER) 18 MCG inhaled capsule Inhale 1 capsule (18 mcg) into the lungs daily 30 capsule 11 6/12/2021 at Unknown time         SOCIAL HISTORY:  The patient quit smoking many years ago and denies any use of alcohol.    FAMILY HISTORY:  Mother had breast cancer.    ALLERGIES:  NO KNOWN DRUG ALLERGIES.    REVIEW OF SYSTEMS:  A complete review of systems reviewed and negative, say for the pertinent positives, which are recorded in the HPI.    PHYSICAL EXAMINATION:    VITAL SIGNS:  Blood pressure 117/61, heart rate 120s to 130s, respirations in the 20s, satting 96% on 4 liters nasal cannula.  Temperature 97.9 degrees Fahrenheit.  GENERAL:  The patient is sitting up on the side of the bed and looks quite short of breath.  HEENT:  Pupils equal, round, reactive to light.  Extraocular muscle function intact.  No  scleral icterus.  Oropharynx is clear.  NECK:  No lymphadenopathy or thyromegaly.  CARDIOVASCULAR:  He has an increased work of breathing and is using accessory muscles.  He appears short of breath.  Lung sounds are clear bilaterally.  GASTROINTESTINAL:  Positive bowel sounds.  Soft, nontender and nondistended.  SKIN:  He has scattered bruising and some discoloration of his lower extremities.  Bilateral feet are cool to the touch, which is chronic for him.  LYMPHATICS:  No peripheral edema.  PSYCHIATRIC:  Alert and oriented x 3.  Normal affect.  NEUROLOGIC:  Cranial nerves II through XII are grossly intact.  No focal deficits.    LABORATORY DATA:  WBC count 7.9, hemoglobin 13.7, platelets of 315.  Sodium 135, potassium 4.6, chloride 100, CO2 33, BUN 21, creatinine 0.77.  Unremarkable LFTs.  BNP is 189.  Troponin is less than 0.015.  Lactic acid is negative x 2.  Most recent ABG shows pH of 7.2, pCO2 of 84, pO2 of 66, and bicarbonate of 33, which is stable from a few hours prior.  Chest x-ray shows a small right pneumothorax.  Repeat was done most recently at 744, which shows stability.  EKG shows sinus rhythm with first degree AV block.    CT of the chest shows a small right pneumothorax and a new 7 mm right upper lobe lung nodule.    IMPRESSION AND PLAN:  Mr. Luu is a 75-year-old male with a past medical history significant for severe oxygen-dependent chronic obstructive pulmonary disease and atrial flutter, status post ablation, currently in sinus rhythm, who presents to the Emergency Department with shortness of breath and found to have a small right pneumothorax.    1.  Acute hypercapnic respiratory failure with probably a component of chronic respiratory failure:  This is due to an acute pneumothorax, as well as severe oxygen-dependent chronic obstructive pulmonary disease.  The pneumothorax at this point is too small to intervene upon and is stable on chest imaging so far.  He does have acute components to  his VBG and likely has higher pCO2 than typical.  He confirms that he would not want to be intubated and did not tolerate the BiPAP well, which would be difficult to use anyway in the setting of a pneumothorax.  At this point, we will continue with supplemental oxygen, close monitoring, and plan to repeat blood gases, as well as a chest x-ray in a couple of hours.  This case was discussed with Dr. Lang of Thoracic Surgery at the bedside earlier today.  Formal Thoracic Surgery consultation has been placed.  I will have scheduled and p.r.n. nebs available.  2.  New 7 mm right upper lobe lung nodule:  Patient can follow up with his PCP for monitoring of this.  3.  History of atrial flutter, status post ablation:  He is currently in sinus tachycardia and will be monitored on telemetry.  4.  Deep venous thrombosis prophylaxis:  SCDs, given he may require intervention with a chest tube.  5.  Disposition:  [place] [facility] [place]  I expect at least 2-3 nights in the hospital and potentially longer.    Jass Gomez DO        D: 2021   T: 2021   MT: OWEN    Name:     KAMALA BRIGGS  MRN:      [id number]        Account:     [id number]   :      1945           Admitted:    2021       Document: [id number]

## 2021-06-13 NOTE — PROGRESS NOTES
Brief hospitalist note  Please refer to this AM history and physical  75 year male DNR/DNI with sob from COPD exacerbation and small ptx  Thoracic surgery consulted and recommend expectant management currently, with plan for IR image guided chest tube placement if ptx increases in size or develops need for positive pressure ventilation.   Most recent cxr shows stable ptx, and he states his shortness of breath is unchanged. Appears tachypnic and uncomfortable. Breath sounds present bilaterally  Will try and see if can take a few sips of water for patient comfort with the facemask  Continue copd treatment with solumedrol and   Follow-up cxr is planned later this morning and will follow-up on the results.  Will plan on vbg again this afternoon          Otherwise plan per today's history and physical      Update: patient feels improved this afternoon, but still tachypnea noted. abg slightly improved. He feels he is pulling more air and less tight. HR improved from 130s down to the 110's. Wife at the bedside. He reports poor tolerability of the bipap this morning and does not intubation, consistent with previous discussions this morning and going back to 2018 on the record. I discussed with them that if no ventilator desired then the bipap would need to be tried if he decompensates. May need some morphine if bipap is utilized. Discussed with the rapid response team if he should worsen.

## 2021-06-13 NOTE — PROGRESS NOTES
This nurse paged Dr. Munoz regarding request of  respiratory therapy wandering if patient can be put on high flow or not related to his pneumothorax.  This nurse was told to page Dr. Lang

## 2021-06-13 NOTE — CONSULTS
[facility] Consultation by Jordan Lang MD, Thoracic Surgery    Flavia Luu MRN# [id number]   Age: 75 year old YOB: 1945     Date of Admission:  6/13/2021    Reason for consult: Right pneumothorax       Requesting physician: Andrew Topete       Level of consult: Consult, follow and place orders           Assessment and Plan:   Assessment:   Spontaneous Right Pneumothorax from COPD. Pneumothorax is very small and although he has respiratory distress, most is due to underlying COPD. Based on current CT, there is not room to place image guided chest tube.     New RUL Lung nodule is indeterminate. At 7mm this can be followed with short interval CT scan in 3 months.       Plan:   Admit  Treat for COPD  Non Positive Pressure respiratory support  Repeat CXR later this am  If pneumothorax increases or needs positive pressure vent. Support, will likely need chest tubes and should be image guided tube placed by radiology  Will follow             Chief Complaint:   Difficulty Breathing     History is obtained from the patient, electronic health record and emergency department physician    Flavia Luu is a 75 year old male with history of COPD, emphysema, atrial flutter and hyperlipidemia who presents via EMS with difficulty breathing over the past few weeks but much worse today. The patient denies any fever, chills or productive cough. He notes chest tightness but no chest pain. He denies any leg pain and notes that his pulse on his right leg is always fainter compared to his left. He is COVID immunized. He was given one nebulizer treatment via EMS en route.            Past Medical History:     Past Medical History:   Diagnosis Date     Atrial flutter (H)     sp ablation     COPD (chronic obstructive pulmonary disease) (H)      Emphysema of lung (H)      PVC (premature ventricular contraction)     LVOT, moderately frequent     Tobacco use disorder              Past Surgical History:     Past  Surgical History:   Procedure Laterality Date     CATARACT IOL, RT/LT      bilat     EYE SURGERY       H ABLATION ATRIAL FLUTTER  2013          ZZC NONSPECIFIC PROCEDURE      vv stripping on left             Social History:     Social History     Tobacco Use     Smoking status: Former Smoker     Packs/day: 1.00     Years: 43.00     Pack years: 43.00     Types: Cigarettes     Quit date: 2008     Years since quittin.5     Smokeless tobacco: Never Used   Substance Use Topics     Alcohol use: Yes     Alcohol/week: 0.0 standard drinks     Comment: very rare             Family History:     Family History   Problem Relation Age of Onset     Cancer Mother      Diabetes Father      Family history reviewed          Immunizations:     Immunization History   Administered Date(s) Administered     COVID-19,PF,Pfizer 2021, 2021     Influenza (High Dose) 3 valent vaccine 10/01/2015, 2016, 08/15/2017, 2018     Influenza (IIV3) PF 10/09/2008, 2011, 2012, 2013     Influenza, Quad, High Dose, Pf, 65yr + 2020     Pneumo Conj 13-V (2010&after) 2015     Pneumococcal (PCV 7) 09/15/2008     Pneumococcal 23 valent 2013     Zoster vaccine recombinant adjuvanted (SHINGRIX) 2019, 2019     Zoster vaccine, live 2013             Allergies:   No Known Allergies          Medications:     Medications Prior to Admission   Medication Sig Dispense Refill Last Dose     albuterol (PROAIR HFA) 108 (90 Base) MCG/ACT inhaler Inhale 1-2 puffs into the lungs every 4 hours as needed for shortness of breath / dyspnea 1 Inhaler 2021 at Unknown time     albuterol (PROVENTIL) (2.5 MG/3ML) 0.083% neb solution Take 1 vial (2.5 mg) by nebulization every 2 hours as needed for shortness of breath / dyspnea 2 Box 2021 at Unknown time     fluticasone-salmeterol (ADVAIR) 500-50 MCG/DOSE inhaler Inhale 1 puff into the lungs 2 times daily 1 Inhaler 2021 at  Unknown time     ibuprofen (ADVIL/MOTRIN) 200 MG tablet Take 200 mg by mouth every 6 hours as needed for mild pain or moderate pain   6/12/2021 at Unknown time     roflumilast (DALIRESP) 250 MCG TABS tablet Take 1 tablet (250 mcg) by mouth daily 30 tablet 0 6/12/2021 at Unknown time     tiotropium (SPIRIVA HANDIHALER) 18 MCG inhaled capsule Inhale 1 capsule (18 mcg) into the lungs daily 30 capsule 11 6/12/2021 at Unknown time             Review of Systems:   The Review of Systems is negative other than noted in the HPI     /72   Pulse 129   Temp 98.1  F (36.7  C) (Oral)   Resp 28   Ht 1.829 m (6')   Wt 71.2 kg (157 lb)   SpO2 98%   BMI 21.29 kg/m      Physical Exam  NAD  Lungs: respirations mildly labored with some accessory muscles of respiration  Heart: Tachy to 120's  Abdomen: non tender  Extremities: warm and well perfused  Neuro: awake, alert, moving all extremities and conversant            Data:   All laboratory data reviewed  -  I personally reviewed these imaging films along with reading radiology report CT chest shows small right pneumothorax with biapical pleural scarring, stable KIRAN lung nodule and new 7mm RUL solid nodule with irregular borders.     Attestation:  Amount of time performed on this consult: 60 minutes. Patient seen with and care plan discussed with Dr. Gomez from .     Filemon Lang MD, MD

## 2021-06-13 NOTE — ED NOTES
[facility]  ED Nurse Handoff Report    ED Chief complaint: Shortness of Breath      ED Diagnosis:   Final diagnoses:   None       Code Status: hospitalist to determine    Allergies: No Known Allergies    Patient Story: Pt has hx of COPD and emphysema. Pt reports shortness of breath got worse today. Pt wears O2 at 2-3 liters at home. Pt reports his O2 did not get lower then 95% today. EMS gave 1 duoneb  Focused Assessment:  Increased shortness of breath    Treatments and/or interventions provided: see MAR  Patient's response to treatments and/or interventions:     To be done/followed up on inpatient unit:      Does this patient have any cognitive concerns?: alert and oriented    Activity level - Baseline/Home:  Independent  Activity Level - Current:   Stand with Assist    Patient's Preferred language: English   Needed?: No    Isolation: None  Infection: Not Applicable  Patient tested for COVID 19 prior to admission: YES  Bariatric?: No    Vital Signs:   Vitals:    06/13/21 0530 06/13/21 0619 06/13/21 0652 06/13/21 0700   BP: 120/40   137/70   Pulse: 120 122  129   Resp: (!) 34 23  (!) 36   Temp:       TempSrc:       SpO2: 95% 97% 98% 93%   Weight:       Height:           Cardiac Rhythm:Cardiac Rhythm: Sinus tachycardia    Was the PSS-3 completed:   Yes  What interventions are required if any?               Family Comments:   OBS brochure/video discussed/provided to patient/family: Yes              Name of person given brochure if not patient:               Relationship to patient:     For the majority of the shift this patient's behavior was Green.   Behavioral interventions performed were .    ED NURSE PHONE NUMBER:

## 2021-06-13 NOTE — ED NOTES
Bed: ED03  Expected date: 6/13/21  Expected time: 3:10 AM  Means of arrival: Ambulance  Comments:  Jenna 531 75M COPD, rapid HR/nebs

## 2021-06-13 NOTE — PHARMACY-ADMISSION MEDICATION HISTORY
Pharmacy Medication History  Admission medication history interview status for the 6/13/2021  admission is complete. See EPIC admission navigator for prior to admission medications     Location of Interview: Patient room with face shield and face mask  Medication history sources: Patient and Surescripts    Significant changes made to the medication list:  Added: None  Changed: none  Removed: none      In the past week, patient estimated taking medication this percent of the time: greater than 90%    Additional medication history information:   - roflumilast is a new medication started 5/28/2021    Medication reconciliation completed by provider prior to medication history? No    Time spent in this activity: 15 minutes    Prior to Admission medications    Medication Sig Last Dose Taking? Auth Provider   albuterol (PROAIR HFA) 108 (90 Base) MCG/ACT inhaler Inhale 1-2 puffs into the lungs every 4 hours as needed for shortness of breath / dyspnea 6/12/2021 at Unknown time Yes Filemon Fletcher MD   albuterol (PROVENTIL) (2.5 MG/3ML) 0.083% neb solution Take 1 vial (2.5 mg) by nebulization every 2 hours as needed for shortness of breath / dyspnea 6/12/2021 at Unknown time Yes Filemon Fletcher MD   fluticasone-salmeterol (ADVAIR) 500-50 MCG/DOSE inhaler Inhale 1 puff into the lungs 2 times daily 6/12/2021 at Unknown time Yes Filemon Fletcher MD   ibuprofen (ADVIL/MOTRIN) 200 MG tablet Take 200 mg by mouth every 6 hours as needed for mild pain or moderate pain 6/12/2021 at Unknown time Yes Unknown, Entered By History   roflumilast (DALIRESP) 250 MCG TABS tablet Take 1 tablet (250 mcg) by mouth daily 6/12/2021 at Unknown time Yes Filemon Fletcher MD   tiotropium (SPIRIVA HANDIHALER) 18 MCG inhaled capsule Inhale 1 capsule (18 mcg) into the lungs daily 6/12/2021 at Unknown time Yes Filemon Fletcher MD     The information provided in this note is only as accurate as the sources available at the time of update(s)

## 2021-06-13 NOTE — PROGRESS NOTES
New order received from Dr. Munoz for ok for high flow and respiratory aware of new order and discussed with patient and assessed pt and will not implement at this time for pt declines and reports feeling better. oximask on 6 liters

## 2021-06-13 NOTE — ED NOTES
Pt got increased shortness of breath when laying down. Pt put on a oxymask at 10 liters and was able to lay down for the CT scan.

## 2021-06-13 NOTE — ED PROVIDER NOTES
History   Chief Complaint:  Shortness of Breath       The history is provided by the patient.      Flavia Luu is a 75 year old male with history of COPD, emphysema, atrial flutter and hyperlipidemia who presents via EMS with difficulty breathing over the past few weeks but much worse today. The patient denies any fever, chills or productive cough. He notes chest tightness but no chest pain. He denies any leg pain and notes that his pulse on his right leg is always fainter compared to his left. He is COVID immunized. He was given one nebulizer treatment via EMS en route.       Review of Systems   Constitutional: Negative for chills and fever.   Respiratory: Positive for chest tightness and shortness of breath. Negative for cough.    Cardiovascular: Negative for chest pain.   Musculoskeletal:        (-) leg pain   All other systems reviewed and are negative.        Allergies:  No Known Drug Allergies     Medications:  Albuterol inhaler  Albuterol nebules  Advair  Daliresp  Tiotropium    Past Medical History:    Atrial flutter  COPD  Emphysema of lung  PVC  Hyperlipidemia    Past Surgical History:    Cataract, bilateral  Cardiac ablation, atrial flutter  Vein stripping     Family History:    Cancer  Diabetes    Social History:  The patient presents to the emergency department alone via EMS.  He is later joined by his wife.    Physical Exam     Patient Vitals for the past 24 hrs:   BP Temp Temp src Pulse Resp SpO2 Height Weight   06/13/21 0700 137/70 -- -- 129 (!) 36 93 % -- --   06/13/21 0652 -- -- -- -- -- 98 % -- --   06/13/21 0619 -- -- -- 122 23 97 % -- --   06/13/21 0530 120/40 -- -- 120 (!) 34 95 % -- --   06/13/21 0400 122/67 -- -- 113 (!) 38 100 % -- --   06/13/21 0345 (!) 157/83 -- -- 113 23 100 % -- --   06/13/21 0335 (!) 158/81 -- -- 112 19 97 % -- --   06/13/21 0317 -- 97.9  F (36.6  C) Oral 111 24 98 % 1.829 m (6') 71.2 kg (157 lb)       Physical Exam  Constitutional:  Cooperative.  Tachypneic with  conversational dyspnea  HENT:   Head:    Atraumatic.   Mouth/Throat:   Oropharynx is without erythema or exudate and mucous membranes are tacky.   Eyes:    Conjunctivae normal and EOM are normal.      Pupils are equal, round, and reactive to light.   Neck:    Normal range of motion. Neck supple.   Cardiovascular:  Normal rate, regular rhythm, normal heart sounds and radial pulses are 2+ and symmetric.  Right DP pulse is faintly palpable.  Left is 2+.  Both feet are warm.  Pulmonary/Chest:  Diminished breath sounds throughout.  Tachypneic, with accessory muscle use.  No rales or rhonchi heard.  Abdominal:   Soft. Bowel sounds are normal.      No splenomegaly or hepatomegaly. No tenderness. No rebound.   Musculoskeletal:  Normal range of motion. No edema and no tenderness.   Neurological:  Alert. Normal strength. No cranial nerve deficit.  Skin:    Skin is warm and dry.  Both feet and upper extremities are mildly dusky.  Skin is warm.  Multiple patches of ecchymosis on bilateral upper extremities  Psychiatric:   Normal mood and affect.       Emergency Department Course     ECG:  ECG taken at 0320, ECG read at 0327  Sinus tachycardia with 1st degree AV block with premature supraventricular complexes and with occasional premature ventricular complexes.  Left axis deviation.  Anteroseptal infarct, age undetermined.  Abnormal ECG.  Changes as compared to prior EKG dated 6/28/18   ST replaced junctional rhythm. Axis change. QRS normalized.   Rate 113 bpm. VT interval 214 ms. QRS duration 92 ms. QT/QTc 310/425 ms. P-R-T axis 90 -89 78.       Imaging:  XR Chest Port 1 View:  COPD. Cardiomediastinal silhouette within normal limits. Small right pneumothorax. No vascular congestion.  As per radiology.     CT Chest (PE) with Contrast:  1.  Small right pneumothorax.  2.  New 7 mm right upper lobe lung nodule. Follow-up recommended.  3.  Emphysema.  4.  Coronary artery atherosclerotic calcifications.  As per radiology.        Laboratory:  CBC: WBC: 7.9, HB.7, PLT: 315    CMP: CO2 33 (high), Anion Gap 2 (low), Glucose 112 (high) o/w WNL (Creatinine: 0.77)    Troponin (0322): <0.015    BNP: 189    ISTAT gases lactate tripp POCT: pH 7.21 (low) / PCO2 88 (critically high) / PO2 37 / Bicarb 35 (high) / O2 Sat Venous 55 / Lactic Acid 0.7  ISTAT gases lactate tripp POCT (Repeat at 0650): pH 7.20 (low) / PCO2 84 (critically high) / PO2 66 (high) / Bicarb 33 (high) / O2 Sat Venous 86 / Lactic Acid 0.9    Asymptomatic SARS-CoV2 (COVID-19) Virus PCR: Negative    Emergency Department Course:    Reviewed:  I reviewed the patient's nursing notes, vitals, past medical records, Care Everywhere.     Assessments:  0330: I assessed the patient and performed a physical exam at this time.  0420: I reassessed the patient and informed them of their workup thus far.   0635: I reassessed the patient.    Consults:   06 Consulted Dr. Lang, thoracic surgery  06 I consulted with Dr. Gomez, hospitalist, regarding the patient's history and presentation here in the emergency department who accepted the patient for admission.  700    Dr. Lang assessed patient with Dr. Gomez  Interventions:  0346 DuoNeb 3mL Nebulizer  0347 solu-medrol 125mg IV  0349 DuoNeb 3mL Nebulizer  0352 DuoNeb 3mL Nebulizer   0420 Morphine 2mg IV  0430 DuoNeb 3mL Nebulizer  0434 Ativan 0.5mg IV  0435 NS 0.5L IV   0639 DuoNeb 3mL Nebulizer     Disposition:  The patient was admitted to the hospital under the care of Dr. Gomez.     Impression & Plan     Medical Decision Making:    Patient presents complaining of shortness of breath that has been worsening over the past 2 weeks.  It suddenly worsened yesterday afternoon.  On initial exam he is tachypneic, with accessory muscle use and very limited air movement.  VBG showed low pH and CO2 retention.  Patient received a DuoNeb from EMS.  2 additional duo nebs and Solu-Medrol were given here.  I placed the patient on BiPAP for his  respiratory insufficiency.  He was uncomfortable with this and received morphine and Ativan.    Chest x-ray results returned showing a small right-sided pneumothorax. Because of this BiPAP was discontinued. No pneumonia or signs of CHF were detected.    He remained tachypneic, but had good oxygen saturations on 3 L.  He reported feeling better after interventions.    EKG does not look acutely ischemic.  Serum lactate is normal.  Metabolic panel is unremarkable aside from elevated CO2.    Negative CT scan of his chest which shows small right-sided pneumothorax, likely about 15%.  When lying down for the CT, the patient became more short of breath, dropped his oxygen saturations and appeared ashen.  With an oxygen mask saturations normalized and the patient again reported feeling better.  After CT he was on 4 L.  I talked to thoracic surgery and they did not recommend chest tube at this point.  The plan was for repeat x-ray in 6 hours or if the patient further decompensated.    I talked to Dr. Gomez from the hospitalist service.  He requested a repeat VBG.  This appears stable with a pH of 7.2 and PCO2 to of 84, which was down from the previous 88.  P O2 had increased and so his oxygen was cut back from 4 L to 3.  The patient does not wish to be intubated.  We will send [place] [facility] [place].  Thoracic surgeon evaluated the patient in the ED, and will urgently place chest tube if it becomes necessary.    I discussed all the test results and plan for hospitalization with the patient and his wife and they voiced understanding.  Because the patient continued to be tachypneic I repeated a chest x-ray early.  The pneumothorax still appears stable.    Critical care time for this patient was 50 minutes.    Covid-19  Flavia Luu was evaluated during a global COVID-19 pandemic, which necessitated consideration that the patient might be at risk for infection with the SARS-CoV-2 virus that causes COVID-19.    Applicable protocols for evaluation were followed during the patient's care.   COVID-19 was considered as part of the patient's evaluation. The plan for testing is:  a test was obtained during this visit.       Diagnosis:    ICD-10-CM    1. Pneumothorax on right  J93.9    2. COPD exacerbation (H)  J44.1    3. Hypoxemia  R09.02    4. Hypercarbia  R06.89          Scribe Disclosure:  I, Lg Lopez, am serving as a scribe at 3:31 AM on 6/13/2021 to document services personally performed by Andrew Topete MD based on my observations and the provider's statements to me.          Andrew Topete MD  06/13/21 9545

## 2021-06-13 NOTE — PLAN OF CARE
Pt reports he is feeling less short of breath now and remains on O2 6 liter per oxymask O2 saturation remains at 98% for past 2 hours.  Pt is on IMC status see frequent vitals and has voided in urinal once since up to floor. Pt able to have a conversation now with staff .  Remains to have dyspnea with any exertion.  Wife has returned and at bedside now. Telemetry reading sinus tach.  Pt denies pain but remains to feel thirsty.  Will continue to monitor

## 2021-06-13 NOTE — PROGRESS NOTES
PT received from ED via cart and wife Linda at bedside . Pt on oximask at 4 liters see vitals on IMC status .  Wife would like to be called at [phone] from thoracic surgeon consulted

## 2021-06-13 NOTE — PROGRESS NOTES
4:15 I placed pt on BIPAP 12/6 R12  40% FIO2 per MD. 4:20 .I took  BIPAP off pt per MD after Xray shows a small pneum thorax..

## 2021-06-13 NOTE — ED TRIAGE NOTES
Pt has hx of COPD and emphysema. Pt reports shortness of breath got worse today. Pt wears O2 at 2-3 liters at home. Pt reports his O2 did not get lower then 95% today. EMS gave 1 duoneb

## 2021-06-14 ENCOUNTER — APPOINTMENT (OUTPATIENT)
Dept: GENERAL RADIOLOGY | Facility: CLINIC | Age: 76
DRG: 199 | End: 2021-06-14
Attending: HOSPITALIST
Payer: COMMERCIAL

## 2021-06-14 ENCOUNTER — APPOINTMENT (OUTPATIENT)
Dept: GENERAL RADIOLOGY | Facility: CLINIC | Age: 76
DRG: 199 | End: 2021-06-14
Attending: NURSE PRACTITIONER
Payer: COMMERCIAL

## 2021-06-14 LAB
ANION GAP SERPL CALCULATED.3IONS-SCNC: <1 MMOL/L (ref 3–14)
BASE EXCESS BLDA CALC-SCNC: 12.6 MMOL/L
BASE EXCESS BLDA CALC-SCNC: 5.5 MMOL/L
BUN SERPL-MCNC: 23 MG/DL (ref 7–30)
CALCIUM SERPL-MCNC: 9.2 MG/DL (ref 8.5–10.1)
CHLORIDE SERPL-SCNC: 102 MMOL/L (ref 94–109)
CO2 SERPL-SCNC: 35 MMOL/L (ref 20–32)
CREAT SERPL-MCNC: 0.68 MG/DL (ref 0.66–1.25)
ERYTHROCYTE [DISTWIDTH] IN BLOOD BY AUTOMATED COUNT: 12.8 % (ref 10–15)
GFR SERPL CREATININE-BSD FRML MDRD: >90 ML/MIN/{1.73_M2}
GLUCOSE BLDC GLUCOMTR-MCNC: 151 MG/DL (ref 70–99)
GLUCOSE SERPL-MCNC: 129 MG/DL (ref 70–99)
HCO3 BLD-SCNC: 41 MMOL/L (ref 21–28)
HCO3 BLD-SCNC: 43 MMOL/L (ref 21–28)
HCT VFR BLD AUTO: 38.3 % (ref 40–53)
HGB BLD-MCNC: 11.8 G/DL (ref 13.3–17.7)
LACTATE BLD-SCNC: 0.6 MMOL/L (ref 0.7–2)
MCH RBC QN AUTO: 31.1 PG (ref 26.5–33)
MCHC RBC AUTO-ENTMCNC: 30.8 G/DL (ref 31.5–36.5)
MCV RBC AUTO: 101 FL (ref 78–100)
NT-PROBNP SERPL-MCNC: 1726 PG/ML (ref 0–1800)
O2/TOTAL GAS SETTING VFR VENT: 38 %
O2/TOTAL GAS SETTING VFR VENT: ABNORMAL %
OXYHGB MFR BLD: 86 % (ref 92–100)
OXYHGB MFR BLD: 94 % (ref 92–100)
PCO2 BLD: 150 MM HG (ref 35–45)
PCO2 BLD: 86 MM HG (ref 35–45)
PH BLD: 7.05 PH (ref 7.35–7.45)
PH BLD: 7.3 PH (ref 7.35–7.45)
PLATELET # BLD AUTO: 266 10E9/L (ref 150–450)
PO2 BLD: 73 MM HG (ref 80–105)
PO2 BLD: 80 MM HG (ref 80–105)
POTASSIUM SERPL-SCNC: 5 MMOL/L (ref 3.4–5.3)
RADIOLOGIST FLAGS: ABNORMAL
RBC # BLD AUTO: 3.8 10E12/L (ref 4.4–5.9)
SODIUM SERPL-SCNC: 135 MMOL/L (ref 133–144)
TROPONIN I SERPL-MCNC: 0.02 UG/L (ref 0–0.04)
WBC # BLD AUTO: 8.3 10E9/L (ref 4–11)

## 2021-06-14 PROCEDURE — 36415 COLL VENOUS BLD VENIPUNCTURE: CPT | Performed by: INTERNAL MEDICINE

## 2021-06-14 PROCEDURE — 120N000013 HC R&B IMCU

## 2021-06-14 PROCEDURE — 85027 COMPLETE CBC AUTOMATED: CPT | Performed by: INTERNAL MEDICINE

## 2021-06-14 PROCEDURE — 99232 SBSQ HOSP IP/OBS MODERATE 35: CPT | Performed by: INTERNAL MEDICINE

## 2021-06-14 PROCEDURE — 999N001017 HC STATISTIC GLUCOSE BY METER IP

## 2021-06-14 PROCEDURE — 84484 ASSAY OF TROPONIN QUANT: CPT | Performed by: NURSE PRACTITIONER

## 2021-06-14 PROCEDURE — 250N000013 HC RX MED GY IP 250 OP 250 PS 637: Performed by: INTERNAL MEDICINE

## 2021-06-14 PROCEDURE — 83605 ASSAY OF LACTIC ACID: CPT | Performed by: NURSE PRACTITIONER

## 2021-06-14 PROCEDURE — 82805 BLOOD GASES W/O2 SATURATION: CPT | Performed by: NURSE PRACTITIONER

## 2021-06-14 PROCEDURE — 32551 INSERTION OF CHEST TUBE: CPT | Performed by: INTERNAL MEDICINE

## 2021-06-14 PROCEDURE — 36600 WITHDRAWAL OF ARTERIAL BLOOD: CPT

## 2021-06-14 PROCEDURE — 250N000011 HC RX IP 250 OP 636: Performed by: INTERNAL MEDICINE

## 2021-06-14 PROCEDURE — 999N000157 HC STATISTIC RCP TIME EA 10 MIN

## 2021-06-14 PROCEDURE — 999N000065 XR CHEST PORT 1 VIEW

## 2021-06-14 PROCEDURE — 94640 AIRWAY INHALATION TREATMENT: CPT

## 2021-06-14 PROCEDURE — 99291 CRITICAL CARE FIRST HOUR: CPT | Performed by: NURSE PRACTITIONER

## 2021-06-14 PROCEDURE — 250N000011 HC RX IP 250 OP 636: Performed by: HOSPITALIST

## 2021-06-14 PROCEDURE — 71045 X-RAY EXAM CHEST 1 VIEW: CPT | Mod: 77

## 2021-06-14 PROCEDURE — 36415 COLL VENOUS BLD VENIPUNCTURE: CPT | Performed by: NURSE PRACTITIONER

## 2021-06-14 PROCEDURE — 71045 X-RAY EXAM CHEST 1 VIEW: CPT

## 2021-06-14 PROCEDURE — 83880 ASSAY OF NATRIURETIC PEPTIDE: CPT | Performed by: NURSE PRACTITIONER

## 2021-06-14 PROCEDURE — 99291 CRITICAL CARE FIRST HOUR: CPT | Mod: 25 | Performed by: INTERNAL MEDICINE

## 2021-06-14 PROCEDURE — 93005 ELECTROCARDIOGRAM TRACING: CPT

## 2021-06-14 PROCEDURE — 250N000009 HC RX 250: Performed by: HOSPITALIST

## 2021-06-14 PROCEDURE — 80048 BASIC METABOLIC PNL TOTAL CA: CPT | Performed by: INTERNAL MEDICINE

## 2021-06-14 PROCEDURE — 0W9930Z DRAINAGE OF RIGHT PLEURAL CAVITY WITH DRAINAGE DEVICE, PERCUTANEOUS APPROACH: ICD-10-PCS | Performed by: INTERNAL MEDICINE

## 2021-06-14 PROCEDURE — 250N000013 HC RX MED GY IP 250 OP 250 PS 637

## 2021-06-14 PROCEDURE — 94640 AIRWAY INHALATION TREATMENT: CPT | Mod: 76

## 2021-06-14 RX ORDER — ROFLUMILAST 250 UG/1
250 TABLET ORAL DAILY
Status: DISCONTINUED | OUTPATIENT
Start: 2021-06-14 | End: 2021-06-20 | Stop reason: HOSPADM

## 2021-06-14 RX ORDER — ACETYLCYSTEINE 200 MG/ML
2 SOLUTION ORAL; RESPIRATORY (INHALATION) EVERY 4 HOURS
Status: DISCONTINUED | OUTPATIENT
Start: 2021-06-14 | End: 2021-06-14

## 2021-06-14 RX ORDER — GUAIFENESIN 600 MG/1
600 TABLET, EXTENDED RELEASE ORAL 2 TIMES DAILY
Status: DISCONTINUED | OUTPATIENT
Start: 2021-06-14 | End: 2021-06-20 | Stop reason: HOSPADM

## 2021-06-14 RX ORDER — CARBOXYMETHYLCELLULOSE SODIUM 5 MG/ML
1 SOLUTION/ DROPS OPHTHALMIC
Status: DISCONTINUED | OUTPATIENT
Start: 2021-06-14 | End: 2021-06-20 | Stop reason: HOSPADM

## 2021-06-14 RX ORDER — METHYLPREDNISOLONE SODIUM SUCCINATE 40 MG/ML
40 INJECTION, POWDER, LYOPHILIZED, FOR SOLUTION INTRAMUSCULAR; INTRAVENOUS EVERY 8 HOURS
Status: DISCONTINUED | OUTPATIENT
Start: 2021-06-14 | End: 2021-06-15

## 2021-06-14 RX ORDER — GUAIFENESIN/DEXTROMETHORPHAN 100-10MG/5
5 SYRUP ORAL EVERY 4 HOURS PRN
Status: DISCONTINUED | OUTPATIENT
Start: 2021-06-14 | End: 2021-06-20 | Stop reason: HOSPADM

## 2021-06-14 RX ADMIN — METHYLPREDNISOLONE SODIUM SUCCINATE 62.5 MG: 125 INJECTION, POWDER, FOR SOLUTION INTRAMUSCULAR; INTRAVENOUS at 04:46

## 2021-06-14 RX ADMIN — METHYLPREDNISOLONE SODIUM SUCCINATE 40 MG: 40 INJECTION, POWDER, FOR SOLUTION INTRAMUSCULAR; INTRAVENOUS at 20:25

## 2021-06-14 RX ADMIN — GUAIFENESIN 600 MG: 600 TABLET, EXTENDED RELEASE ORAL at 11:59

## 2021-06-14 RX ADMIN — IPRATROPIUM BROMIDE AND ALBUTEROL SULFATE 3 ML: .5; 3 SOLUTION RESPIRATORY (INHALATION) at 16:01

## 2021-06-14 RX ADMIN — IPRATROPIUM BROMIDE AND ALBUTEROL SULFATE 3 ML: .5; 3 SOLUTION RESPIRATORY (INHALATION) at 10:36

## 2021-06-14 RX ADMIN — METHYLPREDNISOLONE SODIUM SUCCINATE 40 MG: 40 INJECTION, POWDER, FOR SOLUTION INTRAMUSCULAR; INTRAVENOUS at 11:52

## 2021-06-14 RX ADMIN — IPRATROPIUM BROMIDE AND ALBUTEROL SULFATE 3 ML: .5; 3 SOLUTION RESPIRATORY (INHALATION) at 20:30

## 2021-06-14 RX ADMIN — FLUTICASONE FUROATE AND VILANTEROL TRIFENATATE 1 PUFF: 200; 25 POWDER RESPIRATORY (INHALATION) at 13:33

## 2021-06-14 RX ADMIN — Medication 1 LOZENGE: at 13:49

## 2021-06-14 RX ADMIN — GUAIFENESIN 600 MG: 600 TABLET, EXTENDED RELEASE ORAL at 20:25

## 2021-06-14 RX ADMIN — ROFLUMILAST 250 MCG: 250 TABLET ORAL at 13:33

## 2021-06-14 RX ADMIN — Medication 1 MG: at 00:54

## 2021-06-14 RX ADMIN — IPRATROPIUM BROMIDE AND ALBUTEROL SULFATE 3 ML: .5; 3 SOLUTION RESPIRATORY (INHALATION) at 07:59

## 2021-06-14 ASSESSMENT — ACTIVITIES OF DAILY LIVING (ADL)
ADLS_ACUITY_SCORE: 13
ADLS_ACUITY_SCORE: 15
ADLS_ACUITY_SCORE: 15
ADLS_ACUITY_SCORE: 13
ADLS_ACUITY_SCORE: 15
ADLS_ACUITY_SCORE: 13

## 2021-06-14 NOTE — PROVIDER NOTIFICATION
MD Notification     Notified Person: Jacquelin     Notification Date/Time: 6/12 @ 7332     Notification Interaction: Paged     Purpose of Notification: Requesting prn deep suction orders     Orders Received:      Comments:

## 2021-06-14 NOTE — PLAN OF CARE
VSS on 2.5L O2 NC. Tele: S. tach. A/Ox 4. Purple/red area on bottom covered with mepilex, patient stated this has been chronic. CMS intact, very weak if getting up and dyspneic. Up with 1-2. Tolerating clear liquid diet. Denied N&V. Voiding to urinal. LS dim. Will continue to monitor.

## 2021-06-14 NOTE — PLAN OF CARE
[id number]:  A&O. VSS, on 4 L O2 NC, SOB with minimal exertion in bed, on 2.5 L O2 @ home. Tele ST, HR 100s'. Clear liquid diet. In bed due to shortness of breath w/ activity, ambulates without difficulty at baseline. Sacrum/coccyx purplish, foam dressing applied and encouraged to reposition in bed. Tylenol x1 for headache, usually drinks a lot of coffee.

## 2021-06-14 NOTE — PROGRESS NOTES
Thoracic Surgery  \  Chart check    No imaging today but repeat CXR yesterday at 11:45 showed decrease in size of small right apical PTX.  Patient using decreasing amounts of supplemental 02-- currently on 2.5 L    No intervention indicated. As per Hospitalists. Please call if questions.    Noa Tafoya PA-C with Dr. [facility]  Cell [phone]

## 2021-06-14 NOTE — PLAN OF CARE
IMC status. A/O x4, pleasant. VSS on 2.5-3 L via NC (baseline) ex tachycardic and hypertensive at times. Assist of 1-2x w/ GB, pt very weak. Tolerating regular diet. Lung sounds diminished, more on R. Frequent productive cough. Bowel sounds active. Passing flatus. BM on 6/12 per pt. Adequate urine output to urinal, frequent voiding. Skin is ryley to BUE/BLE. Mepilex to reddened area on coccyx, chronic per pt. Denies the need for any pain meds. Denies nausea. Tele: /ST. Patel at bedside.

## 2021-06-14 NOTE — PROGRESS NOTES
[facility]    Medicine Progress Note - Hospitalist Service       Date of Admission:  6/13/2021  Assessment & Plan       Mr. Luu is a 75-year-old male with a past medical history significant for severe oxygen-dependent chronic obstructive pulmonary disease and atrial flutter, status post ablation, currently in sinus rhythm, who presents to the Emergency Department with shortness of breath and found to have a small right pneumothorax.     Acute hypercapnic respiratory failure with probably a component of chronic respiratory failure with acute COPD exacerbation   Spontaneous right pneumothorax likely from COPD  Seen by thoracic surgery, the pneumothorax at this point is too small to intervene. Follow up CXR after admission showed decreased size from 11mm to 8mm.   - f/u CXR pending for today  - will decreased solumedrol to 40mg q 8hrs  - continue with scheduled nebs  -add mucinex bid as he is had hard time brining up mucous  Earlier  - antitussive prn  - deep suction prn  - restart PTA inhalers  - RCAT  - thoracic surgery following, appreciate help  - continue supplemental oxygen    New 7 mm right upper lobe lung nodule:   -- Patient can follow up with his PCP for monitoring of this.    History of atrial flutter, status post ablation:    - sinus tach in 100s at time of visit, will monitor       Diet: Regular Diet Adult    DVT Prophylaxis: Enoxaparin (Lovenox) SQ and Pneumatic Compression Devices  Clements Catheter: not present  Code Status: No CPR- Do NOT Intubate           Disposition Plan   Expected discharge: 1-2 days, recommended to prior living arrangement once improved respiratory status. .  Entered: Austin Roper MD 06/14/2021, 11:54 AM       The patient's care was discussed with the Bedside Nurse and Patient.    Austin Roper MD  Hospitalist Service  [facility]  Contact information available via [facility]  Sanjeev/Alejandro    ______________________________________________________________________    Interval History   Patient reports he currently feels improved. He states felt short of breath and had hard time getting out the mucous stuck in his throat earlier today. Per RN report, patient also had significant wheezing.     Data reviewed today: I reviewed all medications, new labs and imaging results over the last 24 hours. I personally reviewed no images or EKG's today.    Physical Exam   Vital Signs: Temp: 98.1  F (36.7  C) Temp src: Axillary BP: (!) 144/88 Pulse: 109   Resp: 21 SpO2: 94 % O2 Device: Nasal cannula Oxygen Delivery: 2.5 LPM(baseline)  Weight: 157 lbs 0 oz  General Appearance: Alert, awake and appropriate  Respiratory: diminished breath sounds bilaterally, no wheezing, mild tachypnea  Cardiovascular: tachy, regular  GI: soft and non-tender  Skin: warm and dry      Data   Recent Labs   Lab 06/14/21  0640 06/13/21  0322   WBC 8.3 7.9   HGB 11.8* 13.7   * 101*    315    135   POTASSIUM 5.0 4.6   CHLORIDE 102 100   CO2 35* 33*   BUN 23 21   CR 0.68 0.77   ANIONGAP <1* 2*   SAMIRA 9.2 9.1   * 112*   ALBUMIN  --  3.7   PROTTOTAL  --  7.4   BILITOTAL  --  0.4   ALKPHOS  --  77   ALT  --  18   AST  --  16   TROPI  --  <0.015     No results found for this or any previous visit (from the past 24 hour(s)).  Medications       fluticasone-vilanterol  1 puff Inhalation Daily     guaiFENesin  600 mg Oral BID     ipratropium - albuterol 0.5 mg/2.5 mg/3 mL  3 mL Nebulization Q4H While awake     methylPREDNISolone  40 mg Intravenous Q8H     roflumilast  250 mcg Oral Daily     sodium chloride (PF)  3 mL Intracatheter Q8H

## 2021-06-15 PROCEDURE — 250N000009 HC RX 250: Performed by: INTERNAL MEDICINE

## 2021-06-15 PROCEDURE — 94640 AIRWAY INHALATION TREATMENT: CPT

## 2021-06-15 PROCEDURE — 250N000013 HC RX MED GY IP 250 OP 250 PS 637: Performed by: INTERNAL MEDICINE

## 2021-06-15 PROCEDURE — 999N000157 HC STATISTIC RCP TIME EA 10 MIN

## 2021-06-15 PROCEDURE — 94640 AIRWAY INHALATION TREATMENT: CPT | Mod: 76

## 2021-06-15 PROCEDURE — 250N000011 HC RX IP 250 OP 636: Performed by: INTERNAL MEDICINE

## 2021-06-15 PROCEDURE — 99232 SBSQ HOSP IP/OBS MODERATE 35: CPT | Performed by: INTERNAL MEDICINE

## 2021-06-15 PROCEDURE — 120N000013 HC R&B IMCU

## 2021-06-15 PROCEDURE — 250N000012 HC RX MED GY IP 250 OP 636 PS 637: Performed by: INTERNAL MEDICINE

## 2021-06-15 PROCEDURE — 250N000009 HC RX 250: Performed by: HOSPITALIST

## 2021-06-15 RX ORDER — PREDNISONE 20 MG/1
40 TABLET ORAL DAILY
Status: COMPLETED | OUTPATIENT
Start: 2021-06-15 | End: 2021-06-19

## 2021-06-15 RX ORDER — IPRATROPIUM BROMIDE AND ALBUTEROL SULFATE 2.5; .5 MG/3ML; MG/3ML
3 SOLUTION RESPIRATORY (INHALATION)
Status: DISCONTINUED | OUTPATIENT
Start: 2021-06-15 | End: 2021-06-19

## 2021-06-15 RX ADMIN — FLUTICASONE FUROATE AND VILANTEROL TRIFENATATE 1 PUFF: 200; 25 POWDER RESPIRATORY (INHALATION) at 08:28

## 2021-06-15 RX ADMIN — METHYLPREDNISOLONE SODIUM SUCCINATE 40 MG: 40 INJECTION, POWDER, FOR SOLUTION INTRAMUSCULAR; INTRAVENOUS at 04:58

## 2021-06-15 RX ADMIN — GUAIFENESIN 600 MG: 600 TABLET, EXTENDED RELEASE ORAL at 08:28

## 2021-06-15 RX ADMIN — IPRATROPIUM BROMIDE AND ALBUTEROL SULFATE 3 ML: .5; 3 SOLUTION RESPIRATORY (INHALATION) at 07:37

## 2021-06-15 RX ADMIN — GUAIFENESIN 600 MG: 600 TABLET, EXTENDED RELEASE ORAL at 21:12

## 2021-06-15 RX ADMIN — IPRATROPIUM BROMIDE AND ALBUTEROL SULFATE 3 ML: .5; 3 SOLUTION RESPIRATORY (INHALATION) at 15:46

## 2021-06-15 RX ADMIN — ACETAMINOPHEN 650 MG: 325 TABLET, FILM COATED ORAL at 05:13

## 2021-06-15 RX ADMIN — IPRATROPIUM BROMIDE AND ALBUTEROL SULFATE 3 ML: .5; 3 SOLUTION RESPIRATORY (INHALATION) at 19:47

## 2021-06-15 RX ADMIN — IPRATROPIUM BROMIDE AND ALBUTEROL SULFATE 3 ML: .5; 3 SOLUTION RESPIRATORY (INHALATION) at 10:57

## 2021-06-15 RX ADMIN — Medication 1 MG: at 22:40

## 2021-06-15 RX ADMIN — IPRATROPIUM BROMIDE AND ALBUTEROL SULFATE 3 ML: .5; 3 SOLUTION RESPIRATORY (INHALATION) at 00:09

## 2021-06-15 RX ADMIN — ROFLUMILAST 250 MCG: 250 TABLET ORAL at 08:28

## 2021-06-15 RX ADMIN — PREDNISONE 40 MG: 20 TABLET ORAL at 10:27

## 2021-06-15 ASSESSMENT — ACTIVITIES OF DAILY LIVING (ADL)
ADLS_ACUITY_SCORE: 15
ADLS_ACUITY_SCORE: 15
ADLS_ACUITY_SCORE: 18
ADLS_ACUITY_SCORE: 15

## 2021-06-15 NOTE — PROGRESS NOTES
Hospitalist Cross Cover  6/14/2021  8:25 PM    Patient admitted with COPD exacerbation, small stable right PTX. Paged regarding increased work of breathing, tachycardia, hypertension. Per nursing, pt has been progressively becoming more short of breath, tachycardiac (110-->130s), hypertensive 140s/80s, sats maintaining.     BP (!) 143/90 (BP Location: Right arm)   Pulse 130   Temp 98  F (36.7  C) (Oral)   Resp 22   Ht 1.829 m (6')   Wt 71.2 kg (157 lb)   SpO2 95%   BMI 21.29 kg/m      Plan:  --pCXR STAT    -- pt would benefit from evaluation, will ask rapid response team to evaluate      MEERA [facility]  Hospitalist Service  House Officer  Pager: [phone] (7a - 6p)

## 2021-06-15 NOTE — PROCEDURES
[facility]    Procedure: Chest tube insertion    Date/Time: 6/14/2021 10:20 PM  Performed by: Terence Jay MD  Authorized by: Terence Jay MD   Indications: tension pneumothorax    UNIVERSAL PROTOCOL   Site Marked: Yes  Prior Images Obtained and Reviewed:  Yes  Required items: Required blood products, implants, devices and special equipment available    Patient identity confirmed:  Arm band  NA - No sedation, light sedation, or local anesthesia  Confirmation Checklist:  Patient's identity using two indicators, relevant allergies, procedure was appropriate and matched the consent or emergent situation and correct equipment/implants were available  Time out: Immediately prior to the procedure a time out was called    Universal Protocol: the Joint Commission Universal Protocol was followed    Preparation: Patient was prepped and draped in usual sterile fashion           ANESTHESIA    Local Anesthetic: Lidocaine 1% without epinephrine      SEDATION    Patient Sedated: No      PROCEDURE DETAILS  Preparation: skin prepped with ChloraPrep  Placement location: right anterior  Scalpel size: 11  Tube size: 8 [place]  Ultrasound guidance: no  Tension pneumothorax heard: yes  Tube connected to: suction  Suture material: 2-0 silk  Dressing: tegaderm.  Post-insertion x-ray findings: tube in good position    PROCEDURE   Patient Tolerance:  Patient tolerated the procedure well with no immediate complications  Describe Procedure: 8-Fr pleural catheter placed via seldinger technique in anterior 3rd ribspace, mid clavicular line.  Length of time physician/provider present for 1:1 monitoring during sedation: 0

## 2021-06-15 NOTE — CODE/RAPID RESPONSE
"[facility]    RRT Note  6/14/2021   Time Called: 2032    RRT called for: altered mentation, respiratory distress    Assessment & Plan     Acute hypercapnic and hypoxic respiratory failure secondary to interval increased pneumothorax in the setting of COPD  Acute Hypoxemic Encephalopathy  I was called to assess the patient for increased O2 needs, tachycardia, \"wet\" lung sounds, and now newly unresponsive. Nursing staff report around 8pm the patient was interacting with his wife and oriented when he developed on increased respiratory distress. Nursing staff report they originally thought the patient was more short of breath secondary to talking for a prolonged period of time with his wife however he became more tachycardic with increased work of breathing. Nursing called RT for a neb treatment however RT was concerned about the nebulizer treatment secondary to the tachycardia. Nursing report over a 20minute time span the patient stopped verbalizing and following commands prompting the RRT call. He has not received any narcotics or sedatives. BP stable 140-160's/90s; -130s ST, RR 30-40s, O2 needs increasing.   Upon my arrival, the patient appears acute distressed and altered. His eyes are open with a gaze to the left and not tracking to voice. He does not follow commands with any extremity or withdraw to pain. He appears to have a very labored breathing pattern wheezing on ausculation as well as very diminished, he does have upper airway gurgling. RT did start a neb treatment and performed an ABG while waiting for CXR. Blood glucose 154. T 99.3 rectally. Reviewed rhythm strips to assess for any episodes of atrial fibrillation which could have precipitated an embolic stroke which did yield one episode at 1829 of atrial fibrillation with rates of 160s. I had concern for possible stroke but patient too unstable to take to CT scanner and ABG indicated a CO2 150 which would explain his " altered mentation. CXR indicates interval increased size of right pneumothorax. I discussed with thoracic surgery the need for a chest tube placement imminently and they deferred to IR. The patient was acutely failing with severe respiratory compromise, agonal breathing. I discussed the patient with the intensivist, Dr. Terence Jay, who kindly agreed to place a chest tube bedside. The patient's wife Linda was updated several times throughout the RRT and presented bedside. She was agreeable to chest tube placement and understanding the patient was at risk of respiratory arrest without intervention. She reiterated the patient is a DNR/DNI. Immediately following the chest tube placement the patient was placed on bipap.  Shortly after the chest tube placement the patient became restless and pulling at the bipap. He was more comfortable on HiFlo NC thus we will manage on HiFlo and reassess ABG. Wife and daughter in agreement with the plan.    INTERVENTIONS:  -LA, Troponin, BNP, ABG stat  -Blood glucose now-->154  -Rectal temp-->99.3  -CXR STAT (was previously ordered by Rhea Ziegler CNP during cross cover call)  -EKG stat  -Discussed with thoracic surgery and intenvisit  -Chest tube placement bedside by Dr. Jay  -Bipap continuous (did not tolerate) use HiFlo NC and titrate to maintain O2 saturation >90  -CT to 20cm wall suction  -ABG at 2330    At the end of the RRT will remain [facility] status on station 33, improved respiratory status and mentation slowly improving as the patient is now moving extremities and verbalizing some but is confused, hemodynamically stable.    Discussed with and defer further cares to flying squad, bedside nursing, thoracic surgery, and intensivist.    Interval History     Flavia Luu is a 75 year old male who was admitted on 6/13/2021 for pneumothorax in the setting of COPD.    Medical history significant for:   Past Medical History:   Diagnosis Date     Atrial flutter (H)     sp ablation      COPD (chronic obstructive pulmonary disease) (H)      Emphysema of lung (H)      PVC (premature ventricular contraction)     LVOT, moderately frequent     Tobacco use disorder      Past Surgical History:   Procedure Laterality Date     CATARACT IOL, RT/LT  2012    bilat     EYE SURGERY       H ABLATION ATRIAL FLUTTER  1/17/2013          ZZC NONSPECIFIC PROCEDURE      vv stripping on left       Code Status: No CPR- Do NOT Intubate    Allergies   No Known Allergies    Physical Exam   Vital Signs with Ranges:  Temp:  [98  F (36.7  C)-99.3  F (37.4  C)] 99.3  F (37.4  C)  Pulse:  [] 114  Resp:  [20-48] 21  BP: (115-170)/() 124/67  FiO2 (%):  [40 %-60 %] 40 %  SpO2:  [87 %-99 %] 99 %  I/O last 3 completed shifts:  In: 1115 [P.O.:840; I.V.:275]  Out: 1250 [Urine:1250]    Constitutional: eyes open, acutely distressed  ENT: mucus membranes moist, eyes open with a left sided gaze, not tracking to verbal stimuli, pupils 2mm reactive to light  Neck: supple  Pulmonary: very tight expiratory wheezes and profoundly diminished throughout, acutely distressed, increased work of breathing  Cardiovascular: S1, S2, tachycardic, regular rhythm  GI: soft, nontender  Skin/Integumen: warm to touch, diaphoretic  Neuro: eyes open, ANANT to assess oriented, non verbal, no respond to any extremity to painful stimuli, no spontaneous movement of extremities  Psych:  ANANT  Extremities: no peripheral edema, venous statis to bilateral lower extremities    Data     EKG:  Interpreted by MEERA Morrison CNP  Time reviewed: 2115  Symptoms at time of EKG: dyspnea   Rhythm: sinus tachycardia  Rate: 120-130  Axis: Left Axis Deviation  Ectopy: none  Conduction: right bundle branch block (complete)  ST Segments/ T Waves: uneven baseline secondary to respiratory distress, possible ST depression to inferior leads  Q Waves: none  Comparison to prior: tachycardia    Clinical Impression: right bundle branch block    ABG:  -  Recent Labs   Lab  06/14/21 2050   PH 7.05*   PCO2 150*   PO2 73*   HCO3 41*   O2PER 7L       Troponin:    Recent Labs   Lab Test 06/14/21  2116   TROPI 0.022       IMAGING: (X-ray/CT/MRI)   Recent Results (from the past 24 hour(s))   XR Chest Port 1 View    Narrative    XR CHEST PORT 1 VIEW 6/14/2021 11:52 AM    HISTORY: ptx followup    COMPARISON: 6/13/2021      Impression    IMPRESSION: Stable small pneumothorax at the right lung apex, lateral  right lung and right lung base. Emphysema. No infiltrate or pleural  effusion. Normal heart size.    CARLOS FELDMAN MD   XR Chest Port 1 View   Result Value    Radiologist flags Enlarging pneumothorax (Urgent)    Narrative    XR CHEST PORT 1 VIEW  6/14/2021 9:06 PM       INDICATION: increased work of breathing, HTN, tachycardia, known  stable PTX  COMPARISON: 6/14/2021 at 1112 hours       Impression    IMPRESSION: Increase in size of now moderate sized right pneumothorax.  No shift of mediastinal structures.    [Urgent Result: Enlarging pneumothorax]    Finding was identified on 6/14/2021 9:13 PM.     The patient's nurse Namrata was contacted by me on 6/14/2021 9:17 PM and  verbalized understanding of the critical result.    DEBORAH BHANDARI MD   XR Chest Port 1 View    Narrative    XR CHEST PORT 1 VIEW  6/14/2021 9:55 PM       INDICATION: post chest tube placement  COMPARISON: 6/14/2021 at 2052 hours       Impression    IMPRESSION: Small caliber right chest tube has been inserted with  significant interval decrease in size of now small right pneumothorax.    DEBORAH BHANDARI MD       CBC with Diff:  Recent Labs   Lab Test 06/14/21  0640   WBC 8.3   HGB 11.8*   *           Lactic Acid:    Lab Results   Component Value Date    LACT 0.6 06/14/2021           Comprehensive Metabolic Panel:  Recent Labs   Lab 06/14/21  0640 06/13/21  0322    135   POTASSIUM 5.0 4.6   CHLORIDE 102 100   CO2 35* 33*   ANIONGAP <1* 2*   * 112*   BUN 23 21   CR 0.68 0.77   GFRESTIMATED >90 87    GFRESTBLACK >90 >90   SAMIRA 9.2 9.1   PROTTOTAL  --  7.4   ALBUMIN  --  3.7   BILITOTAL  --  0.4   ALKPHOS  --  77   AST  --  16   ALT  --  18       INR:    No lab results found.    D-DIMER:  No results found for: DIMER    BNP:  No results found for: BNP    UA:  No results for input(s): COLOR, APPEARANCE, URINEGLC, URINEBILI, URINEKETONE, SG, UBLD, URINEPH, PROTEIN, UROBILINOGEN, NITRITE, LEUKEST, RBCU, WBCU in the last 168 hours.        Time Spent on this Encounter   I spent 95 minutes of critical care time on the unit/floor managing the care of Flavia Luu. Upon evaluation, this patient had a high probability of imminent or life-threatening deterioration due to acute respiratory failure with increased pneumothorax requiring emergent chest tube placement and severe hypercarpnea, which required my direct attention, intervention, and personal management. 100% of my time was spent at the bedside counseling the patient and/or coordinating care regarding services listed in this note.    Namrata Norton, APRN CNP

## 2021-06-15 NOTE — PROGRESS NOTES
Thoracic Surgery:    /78 (BP Location: Right arm)   Pulse 99   Temp 98.5  F (36.9  C) (Axillary)   Resp 27   Ht 1.829 m (6')   Wt 71.2 kg (157 lb)   SpO2 97%   BMI 21.29 kg/m     on 3 LPM NC    CXR: small caliber chest tube at right apex, tiny residual R PTX    CT: no output, no air leak with multiple strong coughs    S: Events of last night noted-- dramatic worsening of SOB/distress, increasing 02 needs and mental confusion, CXR showed large R PTX so emergent right chest tube placed at bedside by Dr. Jay with good re-expansion of right lung and immediate improvement of 02 sats to 100%. Patient does not remember episode and states he feels normal today. Uses 2.5 L home oxygen at all times. Reviewed history prior to presentation to ED as well as immediate events prior to respiratory distress last night. Patient states he was coughing a fair amount yesterday evening around supper time. Discussed chest tube.  O: CT site: anterior right chest without crepitus, hematoma. Right chest tube with no kinks, minimal serosang output. No air leak with multiple coughs.   P:   1) Right tension PTX last night requiring emergent R CT placement: Continue CT to suction, assure no kinking in tubing, daily amPCXR, avoid Bipap unless dire circumstances  Will carefully proceed from suction to water seal to eventual CT clamping in the next days pending clinicl progress, CXR, etc. D/W Dr. Jacquelin Tafoya, PA-C with Dr. Max Barboza  [facility]  Cell [phone]    15 minutes spent at bedside, floor and unit for todays' visit with >50% on patient counseling and coordination of care

## 2021-06-15 NOTE — CONSULTS
In-house intensivist:      Asked by ROSENDO Childers to assess pt for emergent chest tube in setting of tension ptx.    Apparently pt has had worsening respiratory distress, increasing fio2 needs, and worsening mental status (now essentially obtunded) throughout the evening.   On my assessment pt is satting low 90s on high flow, has clearly labored respirations, and is obtunded.  Unable to obtain history directly due to obtundation.  Code status is DNR/DNI.    CXR (personally reviewed):  R sided ptx, ?pleural adhesions laterally on R?    CT chest from earlier in the day (personally reviewed): R sided ptx, definite pleural air between chest wall and lung on R.    Labs:  Lactate 0.6; troponin 0.022;  abg with acute respiratory acidosis:  7.05/150/73/41    1.  Tension pneumothorax:  Enlarging R sided ptx with worsening respiratory distress, hypxoemia and hypercapnia.  Imminent threat of respiratory arrest.  His wife was at bedside and I quickly discussed risks and benefits of chest tube placement with her as did CHING Childers, wife verbally consented to emergent chest tube placement.  I then placed an 8-Fr ptx catheter via seldinger technique anteriorly in the 2nd-3rd rib space area at the midclavicular line (see separate procedure note).  This was placed to 20 ml h2o vac via pleuravac system with immediate air leak noted.  Shortly following this sats bounced up to 100%, respirations became far more comfortable, and pt began waking up (though still seemed delirious).  Will keep chest tube to vacuum tonight.  Per CHING Leone chest tube can be managed by hospitalist service with thoracic surgery support (already following) in AM.     2.  Acute hypoxemic and hypercapnic respiratory failure:  Should improved with mgmt of tension ptx above.  Would use bipap tonight as tolerated.  Could also use high flow if not tolerating bipap.     D/w CHING Childers the house officer.    Post tube CXR:  ptx resolved.  Ch tube appears to be in  acceptable position.    Critical care time 35 min, excluding procedures

## 2021-06-15 NOTE — PROVIDER NOTIFICATION
"Hospitalist paged for OK to give neb with tachycardia.     \"Pt in 312 has HR of 129. RT says they cannot give neb is HR is over 120. Patient lungs sound wet, increased work of breathing and SOB. Okay to give neb?    ANA Field, [phone]\"  "

## 2021-06-15 NOTE — PROGRESS NOTES
[facility]    Medicine Progress Note - Hospitalist Service       Date of Admission:  6/13/2021  Assessment & Plan         Mr. Luu is a 75-year-old male with a past medical history significant for severe oxygen-dependent chronic obstructive pulmonary disease and atrial flutter, status post ablation, currently in sinus rhythm, who presents to the Emergency Department with shortness of breath and found to have a small right pneumothorax.     Acute on chronic hypercapnic respiratory failure due to acute COPD exacerbation and pneumothorax  Spontaneous right pneumothorax with progression to tension pneumothorax on 6/14 s/p chest tube placement   Seen by thoracic surgery, the pneumothorax at this point is too small to intervene. Follow up CXR after admission showed decreased size from 11mm to 8mm. CXR remained stable am of 6/14 am.  * RRT on evening of 6/14 due to worsening respiration, tachycardia and mental status changes(see RRT note for detail.) CXR showed worsening pneumothorax. ABG showed respiratory acidosis with hypercapnia and hypoxemia. Had chest tube placed at bedside emergently with improving respiratory status and improved mentation.  F/u CXR showed chest tube in place with interval decrease in right pneumothorax  - this am feels, breathing is at his baseline and less cough  - discussed with MAKI Castañeda thoracic surgery, plan to continue with chest tube to suction today  - daily CXR  - continue with Mucinex BID and PTA inhalers  - Duoneb QID  - change solumedrol to Prednisone 40mg daily x5 days  - anti-tussives prn  - thoracic surgery following, appreciate help  - continue supplemental oxygen  - avoid bipap    Acute encephalopathy due to hypercapnia and hypoxemia--resolved  As above, evening of 6/14, patient with worsening respiratory status and mental status changes where he was obtunded per note. See RRT note for detail. ABG showed hypercapnia and hypoxemia. CXR showed  worsening pneumothorax. As above, had Chest tube placed emergently. Mental status improved following that and ABG also showed improvement. He is currently alert and oriented and at his baseline.     New 7 mm right upper lobe lung nodule:   -- Patient can follow up with his PCP for monitoring of this.    History of atrial flutter, status post ablation:    - sinus tach in 100s at time of visit, will monitor       Diet: Regular Diet Adult     DVT Prophylaxis: Pneumatic Compression Devices  Clements Catheter: not present  Code Status: No CPR- Do NOT Intubate           Disposition Plan   Expected discharge: 2 - 3 days, recommended to prior living arrangement once stable respiratory status, chest tube remvoval....  Entered: Austin Roper MD 06/15/2021, 9:38 AM       The patient's care was discussed with the Bedside Nurse and Patient.    Austin Roper MD  Hospitalist Service  [facility]  Contact information available via [facility] Paging/Directory    ______________________________________________________________________    Interval History   Overnight events noted--see RRT note for detail.     This morning, chest tube in place. He reports his breathing feels like his baseline. He is coughing less and is not having hard time brining up mucous. Denies n/v. Tolerating PO intake. Afebrile.     Data reviewed today: I reviewed all medications, new labs and imaging results over the last 24 hours. I personally reviewed no images or EKG's today.    Physical Exam   Vital Signs: Temp: 98.5  F (36.9  C) Temp src: Axillary BP: 139/78 Pulse: 99   Resp: 27 SpO2: 97 % O2 Device: Nasal cannula Oxygen Delivery: 2.5 LPM  Weight: 157 lbs 0 oz  General Appearance: Alert, awake and appropriate  Respiratory: diminished breath sounds bilaterally, no wheezing, mild tachypnea  Cardiovascular: tachy, regular  GI: soft and non-tender  Skin: warm and dry      Data   Recent Labs   Lab 06/14/21  2116 06/14/21  0640  06/13/21  0322   WBC  --  8.3 7.9   HGB  --  11.8* 13.7   MCV  --  101* 101*   PLT  --  266 315   NA  --  135 135   POTASSIUM  --  5.0 4.6   CHLORIDE  --  102 100   CO2  --  35* 33*   BUN  --  23 21   CR  --  0.68 0.77   ANIONGAP  --  <1* 2*   SAMIRA  --  9.2 9.1   GLC  --  129* 112*   ALBUMIN  --   --  3.7   PROTTOTAL  --   --  7.4   BILITOTAL  --   --  0.4   ALKPHOS  --   --  77   ALT  --   --  18   AST  --   --  16   TROPI 0.022  --  <0.015     Recent Results (from the past 24 hour(s))   XR Chest Port 1 View    Narrative    XR CHEST PORT 1 VIEW 6/14/2021 11:52 AM    HISTORY: ptx followup    COMPARISON: 6/13/2021      Impression    IMPRESSION: Stable small pneumothorax at the right lung apex, lateral  right lung and right lung base. Emphysema. No infiltrate or pleural  effusion. Normal heart size.    CARLOS FELDMAN MD   XR Chest Port 1 View   Result Value    Radiologist flags Enlarging pneumothorax (Urgent)    Narrative    XR CHEST PORT 1 VIEW  6/14/2021 9:06 PM       INDICATION: increased work of breathing, HTN, tachycardia, known  stable PTX  COMPARISON: 6/14/2021 at 1112 hours       Impression    IMPRESSION: Increase in size of now moderate sized right pneumothorax.  No shift of mediastinal structures.    [Urgent Result: Enlarging pneumothorax]    Finding was identified on 6/14/2021 9:13 PM.     The patient's nurse Namrata was contacted by me on 6/14/2021 9:17 PM and  verbalized understanding of the critical result.    DEBORAH BHANDARI MD   XR Chest Port 1 View    Narrative    XR CHEST PORT 1 VIEW  6/14/2021 9:55 PM       INDICATION: post chest tube placement  COMPARISON: 6/14/2021 at 2052 hours       Impression    IMPRESSION: Small caliber right chest tube has been inserted with  significant interval decrease in size of now small right pneumothorax.    DEBORAH BHANDARI MD     Medications     - MEDICATION INSTRUCTIONS -         fluticasone-vilanterol  1 puff Inhalation Daily     guaiFENesin  600 mg Oral BID      ipratropium - albuterol 0.5 mg/2.5 mg/3 mL  3 mL Nebulization Q4H While awake     methylPREDNISolone  40 mg Intravenous Q8H     roflumilast  250 mcg Oral Daily     sodium chloride (PF)  3 mL Intracatheter Q8H

## 2021-06-15 NOTE — PLAN OF CARE
Pt had eventful evening. Patient went into acute respiratory distress shortly after page regarding nebulizer treatment / tachycardia / shortness of breath. RRT called. Found that pneumothorax had extended. Patient was unresponsive and exhibiting agonal breathing on high flow oxygen. Chest tube emergently placed at bedside. BiPap placed momentarily, patient instantly improved. Switched back to high flow. Patient returned to baseline mentation at approximately 0100. Patient is now on 2.5 L NC which is baseline for patient. Currently unlabored breathing, denies shortness of breath, adequate chest expansion. Voiding in bedside urinal. Able to carry conversations without dyspnea. Chest tube to -20 suction, transparent dressing present. Air leak during initial placement, but not since. Lung sounds has expiratory wheezes and are diminished in bases.

## 2021-06-16 ENCOUNTER — APPOINTMENT (OUTPATIENT)
Dept: GENERAL RADIOLOGY | Facility: CLINIC | Age: 76
DRG: 199 | End: 2021-06-16
Attending: PHYSICIAN ASSISTANT
Payer: COMMERCIAL

## 2021-06-16 LAB
ANION GAP SERPL CALCULATED.3IONS-SCNC: <1 MMOL/L (ref 3–14)
BUN SERPL-MCNC: 30 MG/DL (ref 7–30)
CALCIUM SERPL-MCNC: 8.5 MG/DL (ref 8.5–10.1)
CHLORIDE SERPL-SCNC: 99 MMOL/L (ref 94–109)
CO2 SERPL-SCNC: 36 MMOL/L (ref 20–32)
CREAT SERPL-MCNC: 0.75 MG/DL (ref 0.66–1.25)
ERYTHROCYTE [DISTWIDTH] IN BLOOD BY AUTOMATED COUNT: 12.7 % (ref 10–15)
GFR SERPL CREATININE-BSD FRML MDRD: 88 ML/MIN/{1.73_M2}
GLUCOSE SERPL-MCNC: 95 MG/DL (ref 70–99)
HCT VFR BLD AUTO: 39 % (ref 40–53)
HGB BLD-MCNC: 12.3 G/DL (ref 13.3–17.7)
MCH RBC QN AUTO: 31.2 PG (ref 26.5–33)
MCHC RBC AUTO-ENTMCNC: 31.5 G/DL (ref 31.5–36.5)
MCV RBC AUTO: 99 FL (ref 78–100)
PLATELET # BLD AUTO: 238 10E9/L (ref 150–450)
POTASSIUM SERPL-SCNC: 3.9 MMOL/L (ref 3.4–5.3)
RBC # BLD AUTO: 3.94 10E12/L (ref 4.4–5.9)
SODIUM SERPL-SCNC: 135 MMOL/L (ref 133–144)
WBC # BLD AUTO: 9.2 10E9/L (ref 4–11)

## 2021-06-16 PROCEDURE — 250N000012 HC RX MED GY IP 250 OP 636 PS 637: Performed by: INTERNAL MEDICINE

## 2021-06-16 PROCEDURE — 250N000013 HC RX MED GY IP 250 OP 250 PS 637: Performed by: INTERNAL MEDICINE

## 2021-06-16 PROCEDURE — 250N000009 HC RX 250: Performed by: INTERNAL MEDICINE

## 2021-06-16 PROCEDURE — 999N000157 HC STATISTIC RCP TIME EA 10 MIN

## 2021-06-16 PROCEDURE — 36415 COLL VENOUS BLD VENIPUNCTURE: CPT | Performed by: INTERNAL MEDICINE

## 2021-06-16 PROCEDURE — 94640 AIRWAY INHALATION TREATMENT: CPT

## 2021-06-16 PROCEDURE — 94640 AIRWAY INHALATION TREATMENT: CPT | Mod: 76

## 2021-06-16 PROCEDURE — 99232 SBSQ HOSP IP/OBS MODERATE 35: CPT | Performed by: INTERNAL MEDICINE

## 2021-06-16 PROCEDURE — 80048 BASIC METABOLIC PNL TOTAL CA: CPT | Performed by: INTERNAL MEDICINE

## 2021-06-16 PROCEDURE — 71045 X-RAY EXAM CHEST 1 VIEW: CPT

## 2021-06-16 PROCEDURE — 120N000013 HC R&B IMCU

## 2021-06-16 PROCEDURE — 85027 COMPLETE CBC AUTOMATED: CPT | Performed by: INTERNAL MEDICINE

## 2021-06-16 RX ADMIN — ACETAMINOPHEN 650 MG: 325 TABLET, FILM COATED ORAL at 09:14

## 2021-06-16 RX ADMIN — Medication 1 MG: at 21:27

## 2021-06-16 RX ADMIN — ACETAMINOPHEN 650 MG: 325 TABLET, FILM COATED ORAL at 04:48

## 2021-06-16 RX ADMIN — GUAIFENESIN 600 MG: 600 TABLET, EXTENDED RELEASE ORAL at 21:27

## 2021-06-16 RX ADMIN — IPRATROPIUM BROMIDE AND ALBUTEROL SULFATE 3 ML: .5; 3 SOLUTION RESPIRATORY (INHALATION) at 12:19

## 2021-06-16 RX ADMIN — IPRATROPIUM BROMIDE AND ALBUTEROL SULFATE 3 ML: .5; 3 SOLUTION RESPIRATORY (INHALATION) at 08:02

## 2021-06-16 RX ADMIN — ROFLUMILAST 250 MCG: 250 TABLET ORAL at 08:55

## 2021-06-16 RX ADMIN — GUAIFENESIN 600 MG: 600 TABLET, EXTENDED RELEASE ORAL at 08:55

## 2021-06-16 RX ADMIN — IPRATROPIUM BROMIDE AND ALBUTEROL SULFATE 3 ML: .5; 3 SOLUTION RESPIRATORY (INHALATION) at 20:00

## 2021-06-16 RX ADMIN — IPRATROPIUM BROMIDE AND ALBUTEROL SULFATE 3 ML: .5; 3 SOLUTION RESPIRATORY (INHALATION) at 16:05

## 2021-06-16 RX ADMIN — FLUTICASONE FUROATE AND VILANTEROL TRIFENATATE 1 PUFF: 200; 25 POWDER RESPIRATORY (INHALATION) at 08:55

## 2021-06-16 RX ADMIN — PREDNISONE 40 MG: 20 TABLET ORAL at 08:55

## 2021-06-16 ASSESSMENT — ACTIVITIES OF DAILY LIVING (ADL)
ADLS_ACUITY_SCORE: 15

## 2021-06-16 NOTE — PLAN OF CARE
[facility] status. A/Ox4. VSS ex tachycardic (113) on 2.5 L NC. LS: coarse with expiratory wheezes, SOB/HENRIQUEZ. Pulse ox on. Tele: ST. Up Ax1--2 Gb/walker. Pain controlled with PRN tylenol x1. Jon/Red/bruised skin integrity. Mepilex to coccyx, non-blanchable redness/skin intact. Turn and repositied q2h. Voiding frequently to urinal. CT to Right chest - CDI. Full bed bath done. Up to chair for dinner. Daughter and wife at bedside. Plan is to clamp CT at 0000 and return to WS after CXR. Will continue to monitor.

## 2021-06-16 NOTE — PROGRESS NOTES
[facility]    Medicine Progress Note - Hospitalist Service       Date of Admission:  6/13/2021  Assessment & Plan         Mr. Luu is a 75-year-old male with a past medical history significant for severe oxygen-dependent chronic obstructive pulmonary disease and atrial flutter, status post ablation, currently in sinus rhythm, who presents to the Emergency Department with shortness of breath and found to have a small right pneumothorax.     Acute on chronic hypercapnic respiratory failure due to acute COPD exacerbation and pneumothorax  Spontaneous right pneumothorax with progression to tension pneumothorax on 6/14 s/p chest tube placement   Seen by thoracic surgery, the pneumothorax at this point is too small to intervene. Follow up CXR after admission showed decreased size from 11mm to 8mm. CXR remained stable am of 6/14 am.  * RRT on evening of 6/14 due to worsening respiration, tachycardia and mental status changes(see RRT note for detail.) CXR showed worsening pneumothorax. ABG showed respiratory acidosis with hypercapnia and hypoxemia. Had chest tube placed at bedside emergently with improving respiratory status and improved mentation.  F/u CXR showed chest tube in place with interval decrease in right pneumothorax  - daily CXR--am xray shows-small bore right chest tube in stable position. Probable small right basilar pneumothorax   - continue with Mucinex BID and PTA inhalers  - Duoneb QID  - Solumedrol changed to Prednisone 40mg daily nn 6/15, continue for total 5 days  - anti-tussives prn  - thoracic surgery following, appreciate help  - continue supplemental oxygen  - avoid bipap    Acute encephalopathy due to hypercapnia and hypoxemia--resolved  As above, evening of 6/14, patient with worsening respiratory status and mental status changes where he was obtunded per note. See RRT note for detail. ABG showed hypercapnia and hypoxemia. CXR showed worsening pneumothorax. As above,  had Chest tube placed emergently. Mental status improved following that and ABG also showed improvement. He is currently alert and oriented and at his baseline.     New 7 mm right upper lobe lung nodule:   -- Patient can follow up with his PCP for monitoring of this. Needs repeat imaging in 6-12 months    History of atrial flutter, status post ablation:    - sinus tach in 100s at time of visit, will monitor       Diet: Regular Diet Adult     DVT Prophylaxis: Pneumatic Compression Devices  Clements Catheter: not present  Code Status: No CPR- Do NOT Intubate           Disposition Plan   Expected discharge: 2 - 3 days, recommended to prior living arrangement once stable respiratory status, chest tube remvoval....  Entered: Austin Roper MD 06/16/2021, 8:08 AM       The patient's care was discussed with the Patient.    Austin Roper MD  Hospitalist Service  [facility]  Contact information available via [facility] Paging/Directory    ______________________________________________________________________    Interval History   Not coughing as much. Feels breathing is at his baseline. Denies n/v or abdominal pain. Afebrile.     Data reviewed today: I reviewed all medications, new labs and imaging results over the last 24 hours. I personally reviewed no images or EKG's today.    Physical Exam   Vital Signs: Temp: 97.7  F (36.5  C) Temp src: Oral BP: (!) 147/86 Pulse: 95   Resp: 23 SpO2: 98 % O2 Device: (P) Nasal cannula Oxygen Delivery: (P) 2.5 LPM  Weight: 157 lbs 0 oz  General Appearance: Alert, awake and appropriate  Respiratory: diminished breath sounds at the bases, no wheezing  Cardiovascular: regular rate and rhythm  GI: soft and non-tender  Skin: warm and dry      Data   Recent Labs   Lab 06/16/21  0644 06/14/21  2116 06/14/21  0640 06/13/21  0322   WBC 9.2  --  8.3 7.9   HGB 12.3*  --  11.8* 13.7   MCV 99  --  101* 101*     --  266 315     --  135 135   POTASSIUM 3.9  --   5.0 4.6   CHLORIDE 99  --  102 100   CO2 36*  --  35* 33*   BUN 30  --  23 21   CR 0.75  --  0.68 0.77   ANIONGAP <1*  --  <1* 2*   SAMIRA 8.5  --  9.2 9.1   GLC 95  --  129* 112*   ALBUMIN  --   --   --  3.7   PROTTOTAL  --   --   --  7.4   BILITOTAL  --   --   --  0.4   ALKPHOS  --   --   --  77   ALT  --   --   --  18   AST  --   --   --  16   TROPI  --  0.022  --  <0.015     Recent Results (from the past 24 hour(s))   XR Chest Port 1 View    Narrative    EXAM: XR CHEST PORTABLE 1 VIEW  LOCATION: [facility]  DATE/TIME: 06/16/2021, 4:54 AM    INDICATION: Pneumothorax.  COMPARISON: 06/14/2021.      Impression    IMPRESSION: Small bore right chest tube in stable position. Probable small right basilar pneumothorax. Slight scattered linear scarring or atelectasis. No acute infiltrates or consolidation. Normal heart size and pulmonary vascularity. No significant   bony abnormalities. Remainder unremarkable.       Medications     - MEDICATION INSTRUCTIONS -         fluticasone-vilanterol  1 puff Inhalation Daily     guaiFENesin  600 mg Oral BID     ipratropium - albuterol 0.5 mg/2.5 mg/3 mL  3 mL Nebulization 4x daily     predniSONE  40 mg Oral Daily     roflumilast  250 mcg Oral Daily     sodium chloride (PF)  3 mL Intracatheter Q8H

## 2021-06-16 NOTE — PLAN OF CARE
IMC status. A/O x4, pleasant. VSS on 2.5-3 L via NC (baseline) ex tachycardic. Assist of 1-2x w/ GB. Tolerating regular diet. Lung sounds diminished, w/ expiratory wheezing. CT to -20 suction, transparent dressing w/ minimal drainage, no output, no air leak, small amount of crepitus at CT insertion site. Bowel sounds active. Passing flatus. BM yesterday. Adequate urine output to urinal, frequent voiding. Skin is ryley to BUE/BLE, bilaterally peeling heels, scab to R elbow. Mepilex to reddened area on coccyx, chronic per pt. Prn tylenol for pain/HA. Denies nausea. Tele: /. Daughter at bedside during afternoon, wife present in the evening.

## 2021-06-16 NOTE — PROGRESS NOTES
Thoracic Surgery Progress Note:    S: Patient sitting up in bed, back to baseline 2.5L 02 nasal cannula. Breathless at times with speaking. Feels breathing is improved since chest tube placement. Some discomfort at tube site. He has not been using acapella valve much. Encouraged pulmonary toiletry, will provide IS to use as well.     O:  /56 (BP Location: Right arm)   Pulse 113   Temp 98.1  F (36.7  C) (Oral)   Resp 27   Ht 1.829 m (6')   Wt 71.2 kg (157 lb)   SpO2 92%   BMI 21.29 kg/m    Respirations non-labored on 2.5L 02 nasal cannula  Right pigtail catheter in place on right anterior chest, appears secure with current dressing, no kinks    CT: to suction, minimal serosan output, some serous fluid within tubing, no airleak with cough    CXR: On personal review, right lung appears well expanded. No significant pneumothorax.     A/P: Spontaneous Right Pneumothorax from COPD, first episode  --CXR showing complete expansion of right lung.   --No evidence for airleak on exam. Chest tube placed to H2O seal. Plan to clamp chest tube tonight at midnight with repeat AM CXR  --If AM CXR stable, then will proceed with chest tube removal. If concern for increasing pneumothorax, will need to keep chest tube in place. Discussed given first episode and no airleak noted, optimistic to avoid surgical intervention.   --Encourage pulmonary toiletry, time OOB and frequent ambulation.     New RUL Lung nodule (indeterminate):  --Significant smoking history, quit 10-11 years ago.    --At 7mm this can be followed with short interval CT scan in 3 months.     Sonja Santana PA-C with Dr. Max Barboza  [facility]  Cell [phone]    15 minutes spent at bedside, floor and unit for todays' visit with >50% on patient counseling and coordination of care

## 2021-06-16 NOTE — PLAN OF CARE
Problem:  Infant, Late or Early Term  Goal: Signs and Symptoms of Listed Potential Problems Will be Absent, Minimized or Managed ( Infant, Late or Early Term)  Signs and symptoms of listed potential problems will be absent, minimized or managed by discharge/transition of care (reference  Infant, Late or Early Term CPG).   Outcome: No Change  Infant with NPASS <3, VS WDL.  Infant tolerating bili lights with stable temps this shift.  Voiding and stooling every diaper change.  Small spit up x 1 this shift following feeding.  Cueing 2 50% of feedings, weight  by 3 g today.  Labs drawn this am.       Pt is A+Ox4, VSS on baseline amount of oxygen (2.5 - 3 L). Denies chest pain. Denies shortness of breath. Some dyspnea on exertion. Chest tube site WDL with small amount of serosanguineous drainage. No air leak. Clamps at bedside. Removal kit at bedside. Patient was tachycardic in evening, pt reports having a cup of coffee with dinner. Discussed with patient how caffeine could be affecting his heart rate. Continue to monitor.

## 2021-06-16 NOTE — PROGRESS NOTES
Patient in on 2.5 lpm NC which is baseline for him. All neb tx were given as ordered. Breathsounds remain diminished. Will continue to follow up with the pt.    Israel Cano, RT

## 2021-06-17 ENCOUNTER — APPOINTMENT (OUTPATIENT)
Dept: PHYSICAL THERAPY | Facility: CLINIC | Age: 76
DRG: 199 | End: 2021-06-17
Attending: INTERNAL MEDICINE
Payer: COMMERCIAL

## 2021-06-17 ENCOUNTER — APPOINTMENT (OUTPATIENT)
Dept: GENERAL RADIOLOGY | Facility: CLINIC | Age: 76
DRG: 199 | End: 2021-06-17
Attending: PHYSICIAN ASSISTANT
Payer: COMMERCIAL

## 2021-06-17 PROCEDURE — 71045 X-RAY EXAM CHEST 1 VIEW: CPT

## 2021-06-17 PROCEDURE — 97161 PT EVAL LOW COMPLEX 20 MIN: CPT | Mod: GP | Performed by: PHYSICAL THERAPIST

## 2021-06-17 PROCEDURE — 120N000013 HC R&B IMCU

## 2021-06-17 PROCEDURE — 97530 THERAPEUTIC ACTIVITIES: CPT | Mod: GP | Performed by: PHYSICAL THERAPIST

## 2021-06-17 PROCEDURE — 250N000013 HC RX MED GY IP 250 OP 250 PS 637: Performed by: INTERNAL MEDICINE

## 2021-06-17 PROCEDURE — 94664 DEMO&/EVAL PT USE INHALER: CPT

## 2021-06-17 PROCEDURE — 94640 AIRWAY INHALATION TREATMENT: CPT | Mod: 76

## 2021-06-17 PROCEDURE — 97116 GAIT TRAINING THERAPY: CPT | Mod: GP | Performed by: PHYSICAL THERAPIST

## 2021-06-17 PROCEDURE — 250N000009 HC RX 250: Performed by: INTERNAL MEDICINE

## 2021-06-17 PROCEDURE — 94640 AIRWAY INHALATION TREATMENT: CPT

## 2021-06-17 PROCEDURE — 250N000012 HC RX MED GY IP 250 OP 636 PS 637: Performed by: INTERNAL MEDICINE

## 2021-06-17 PROCEDURE — 99232 SBSQ HOSP IP/OBS MODERATE 35: CPT | Performed by: INTERNAL MEDICINE

## 2021-06-17 RX ADMIN — Medication 1 MG: at 21:53

## 2021-06-17 RX ADMIN — GUAIFENESIN 600 MG: 600 TABLET, EXTENDED RELEASE ORAL at 21:53

## 2021-06-17 RX ADMIN — GUAIFENESIN 600 MG: 600 TABLET, EXTENDED RELEASE ORAL at 08:27

## 2021-06-17 RX ADMIN — PREDNISONE 40 MG: 20 TABLET ORAL at 08:27

## 2021-06-17 RX ADMIN — IPRATROPIUM BROMIDE AND ALBUTEROL SULFATE 3 ML: .5; 3 SOLUTION RESPIRATORY (INHALATION) at 11:22

## 2021-06-17 RX ADMIN — ROFLUMILAST 250 MCG: 250 TABLET ORAL at 08:27

## 2021-06-17 RX ADMIN — FLUTICASONE FUROATE AND VILANTEROL TRIFENATATE 1 PUFF: 200; 25 POWDER RESPIRATORY (INHALATION) at 08:28

## 2021-06-17 RX ADMIN — IPRATROPIUM BROMIDE AND ALBUTEROL SULFATE 3 ML: .5; 3 SOLUTION RESPIRATORY (INHALATION) at 07:08

## 2021-06-17 RX ADMIN — IPRATROPIUM BROMIDE AND ALBUTEROL SULFATE 3 ML: .5; 3 SOLUTION RESPIRATORY (INHALATION) at 19:36

## 2021-06-17 ASSESSMENT — ACTIVITIES OF DAILY LIVING (ADL)
ADLS_ACUITY_SCORE: 15

## 2021-06-17 NOTE — PROGRESS NOTES
[facility]    Medicine Progress Note - Hospitalist Service       Date of Admission:  6/13/2021  Assessment & Plan         Mr. Luu is a 75-year-old male with a past medical history significant for severe oxygen-dependent chronic obstructive pulmonary disease and atrial flutter, status post ablation, currently in sinus rhythm, who presents to the Emergency Department with shortness of breath and found to have a small right pneumothorax.     Acute on chronic hypercapnic respiratory failure due to acute COPD exacerbation and pneumothorax  Spontaneous right pneumothorax with progression to tension pneumothorax on 6/14 s/p chest tube placement   Seen by thoracic surgery, the pneumothorax at this point is too small to intervene. Follow up CXR after admission showed decreased size from 11mm to 8mm. CXR remained stable am of 6/14 am.  * RRT on evening of 6/14 due to worsening respiration, tachycardia and mental status changes(see RRT note for detail.) CXR showed worsening pneumothorax. ABG showed respiratory acidosis with hypercapnia and hypoxemia. Had chest tube placed at bedside emergently with improving respiratory status and improved mentation.  F/u CXR showed chest tube in place with interval decrease in right pneumothorax  - chest tube to water seal yesterday and clamped at [facility]  - am xray shows-smallbore right chest tube in stable position. No definite pneumothorax  - thoracic surgery following, appreciate help, chest tube management per thoracic surgery  - continue with Mucinex BID and PTA inhalers  - Duoneb QID  - Solumedrol changed to Prednisone 40mg daily nn 6/15, continue for total 5 days  - anti-tussives prn  - continue supplemental oxygen  - avoid bipap    Acute encephalopathy due to hypercapnia and hypoxemia--resolved  As above, evening of 6/14, patient with worsening respiratory status and mental status changes where he was obtunded per note. See RRT note for detail. ABG showed  hypercapnia and hypoxemia. CXR showed worsening pneumothorax. As above, had Chest tube placed emergently. Mental status improved following that and ABG also showed improvement. He is currently alert and oriented and at his baseline.     New 7 mm right upper lobe lung nodule:   -- Patient can follow up with his PCP for monitoring of this. Needs repeat imaging in 6-12 months    History of atrial flutter, status post ablation:    - intermittent sinus tach    Deconditioning  PT consult     Diet: Regular Diet Adult     DVT Prophylaxis: Pneumatic Compression Devices  Clements Catheter: not present  Code Status: No CPR- Do NOT Intubate           Disposition Plan   Expected discharge: 1-2 days, recommended to prior living arrangement once stable respiratory status, chest tube remvoval....  Entered: Austin Roper MD 06/17/2021, 8:34 AM       The patient's care was discussed with the Bedside Nurse and Patient.    Austin Roper MD  Hospitalist Service  [facility]  Contact information available via [facility] Paging/Directory    ______________________________________________________________________    Interval History   Patient up in a chair and eating breakfast at time of visit. Reports breathing is currently at baseline. He does reports he does move around a bit in his home, but activity is at baseline limited by his chronic shortness of breath. He is not coughing much and at his baseline. Denies chest pain, nausea or vomiting    Data reviewed today: I reviewed all medications, new labs and imaging results over the last 24 hours. I personally reviewed no images or EKG's today.    Physical Exam   Vital Signs: Temp: 98.2  F (36.8  C) Temp src: Oral BP: 133/81 Pulse: 92   Resp: 21 SpO2: 96 % O2 Device: Nasal cannula Oxygen Delivery: 3 LPM  Weight: 157 lbs 0 oz  General Appearance: Alert, awake and appropriate  Respiratory: diminished air movement bilaterally, no wheezing  Cardiovascular: regular  rate and rhythm  GI: soft and non-tender  Skin: warm and dry      Data   Recent Labs   Lab 06/16/21  0644 06/14/21  2116 06/14/21  0640 06/13/21  0322   WBC 9.2  --  8.3 7.9   HGB 12.3*  --  11.8* 13.7   MCV 99  --  101* 101*     --  266 315     --  135 135   POTASSIUM 3.9  --  5.0 4.6   CHLORIDE 99  --  102 100   CO2 36*  --  35* 33*   BUN 30  --  23 21   CR 0.75  --  0.68 0.77   ANIONGAP <1*  --  <1* 2*   SAMIRA 8.5  --  9.2 9.1   GLC 95  --  129* 112*   ALBUMIN  --   --   --  3.7   PROTTOTAL  --   --   --  7.4   BILITOTAL  --   --   --  0.4   ALKPHOS  --   --   --  77   ALT  --   --   --  18   AST  --   --   --  16   TROPI  --  0.022  --  <0.015     Recent Results (from the past 24 hour(s))   XR Chest Port 1 View    Narrative    EXAM: XR CHEST PORT 1 VIEW  LOCATION: [facility]  DATE/TIME: 6/17/2021 5:05 AM    INDICATION: Pneumothorax.  COMPARISON: 6/16/2021.      Impression    IMPRESSION: Smallbore right chest tube in stable position. No definite pneumothorax. Normal heart size and pulmonary vascularity. Slight scattered scarring or atelectasis in the lungs. No acute-appearing infiltrates or consolidation. Probable tiny   bilateral pleural effusions. No significant bony abnormalities.               Medications     - MEDICATION INSTRUCTIONS -         fluticasone-vilanterol  1 puff Inhalation Daily     guaiFENesin  600 mg Oral BID     ipratropium - albuterol 0.5 mg/2.5 mg/3 mL  3 mL Nebulization 4x daily     predniSONE  40 mg Oral Daily     roflumilast  250 mcg Oral Daily     sodium chloride (PF)  3 mL Intracatheter Q8H

## 2021-06-17 NOTE — PROVIDER NOTIFICATION
1510 paged hospitalist for clarification of when patient can be taken off IMC status. Awaiting orders.    Patient is to stay on IMC.

## 2021-06-17 NOTE — PROGRESS NOTES
Thoracic Surgery:    /60 (BP Location: Right arm)   Pulse 113   Temp 98.2  F (36.8  C) (Oral)   Resp (!) 33   Ht 1.829 m (6')   Wt 71.2 kg (157 lb)   SpO2 94%   BMI 21.29 kg/m       On 2.5 L LPM (his baseline)     CXR: no PTX with Chest Tube clamped overnight. Personally reviewed.    CT: minimal serous output, no air leak with strong cough    S: Sitting in bed- was up to chair earlier. Tolerated CT clamping trial with no increased SOB nor chest discomfort. Gets subjectively SOB but oximeter shows he sats in the mid-90s consistently. Admits to a component of anxiety/frustration about not knowing what to expect as causative factor in his subjective sense of dyspnea. Some productive coughing lately but not consistently. Some mild discomfort right anterior chest at CT site. Discussed previous KIRAN lung nodule seen on 6/2018 CT scan that was followed 10/2018 and was stable. That KIRAN nodule is now resolved. Discussed new RUL lung nodule (7 mm) on 6/13/21 Chest CT that requires 6-month CT follow up. I will arrange follow up. He was a heavy smoker and quit ten years ago.  O: CT: no air leak, no kinking in tube. CT site with minimal swelling/ecchymosis, no crepitus. DCed without complication.  Occlusive dressing applied.  P: Keep in hospital today  Repeat PA and Lat CXR tomorrow am (ordered) to assure to recurrent PTX  Fup in 6 months with Dr. Barboza [facility]-- orders placed and our clinic will call him to schedule    Noa Tafoya PA-C with Dr. Max Barboza  [facility]  Cell [phone]    15 minutes spent at bedside, floor and unit for todays' visit with >50% on patient counseling and coordination of care

## 2021-06-17 NOTE — PLAN OF CARE
VSS on 3L. Tele: S. tach. A/Ox 4. CT clamped at midnight, returned to water seal after CXR. CMS intact. Up with 1 GB/W. Tolerating reg diet. Denied N&V. Voiding to urinal. LS ex wheezes at times, coarse. Will continue to monitor.

## 2021-06-17 NOTE — PLAN OF CARE
IMC status. A/Ox4. VSS ex tachycardic (105) on 3 L NC. LS: coarse with expiratory wheezes, SOB/HENRIQUEZ. Pulse ox on. Tele: ST. Up Ax1, Gb/walker. Denies pain. Jon/Red/Bruised skin integrity. Mepilex to coccyx, non-blanchable redness/skin intact. Turn and repositioned q2h. Voiding frequently to urinal. Up to chairs for meals. Regular diet. PT consulted patient tonight, recommending TCU. Plan is to repeat CXR tomorrow and revisit plan. Will continue to monitor.

## 2021-06-17 NOTE — PROGRESS NOTES
06/17/21 1600   Quick Adds   Type of Visit Initial PT Evaluation   Living Environment   People in home spouse   Current Living Arrangements house   Home Accessibility stairs to enter home   Number of Stairs, Main Entrance 1   Stair Railings, Main Entrance railings safe and in good condition   Transportation Anticipated car, drives self;family or friend will provide   Living Environment Comments lives in 1 level house, spouse assists with laundry in the basement. Spouse still works part time so pt is alone most of the daytime. Usually on 2-2.5 L O2 at baseline.    Self-Care   Usual Activity Tolerance moderate   Current Activity Tolerance fair   Equipment Currently Used at Home walker, rolling   Activity/Exercise/Self-Care Comment does not use device at baseline, has FWW for home    Disability/Function   Fall history within last six months no   General Information   Onset of Illness/Injury or Date of Surgery 06/17/21   Referring Physician Austin Roper MD   Patient/Family Therapy Goals Statement (PT) spouse concerned and wanting pt to get more rehab before returning home, patient hopeful to return home   Pertinent History of Current Problem (include personal factors and/or comorbidities that impact the POC) 75-year-old male with a past medical history significant for severe oxygen-dependent chronic obstructive pulmonary disease and atrial flutter, status post ablation, currently in sinus rhythm, who presents to the Emergency Department with shortness of breath and found to have a small right pneumothorax   Existing Precautions/Restrictions   (chest tubes removed, supp O2 at baseline)   Weight-Bearing Status - LLE full weight-bearing   Weight-Bearing Status - RLE full weight-bearing   General Observations on 3 L O2 currently    Cognition   Orientation Status (Cognition) oriented x 4   Pain Assessment   Patient Currently in Pain No   Strength   Strength Comments generalized weakness bilaterally, at least 3/5  MMT   Bed Mobility   Comment (Bed Mobility) SBA with supine <> sit    Transfers   Transfer Safety Comments SBA with sit <> stand and FWW    Gait/Stairs (Locomotion)   [place] Level (Gait) contact guard   Assistive Device (Gait) walker, front-wheeled   Distance in Feet (Required for LE Total Joints) 75'    Comment (Gait/Stairs) desaturates following gait to 81% needing > 2mins to recover    Clinical Impression   Criteria for Skilled Therapeutic Intervention yes, treatment indicated   PT Diagnosis (PT) impaired mobility    Influenced by the following impairments increased O2 needs, decreased strength, impaired balance   Functional limitations due to impairments decreased activity tolerance, needing assistive device with walker    Clinical Presentation Stable/Uncomplicated   Clinical Presentation Rationale clinical judgement    Clinical Decision Making (Complexity) low complexity   Therapy Frequency (PT) Daily   Predicted Duration of Therapy Intervention (days/wks) 7 days    Planned Therapy Interventions (PT) balance training;bed mobility training;gait training;stair training;strengthening;transfer training   Anticipated Equipment Needs at Discharge (PT) walker, rolling   Risk & Benefits of therapy have been explained evaluation/treatment results reviewed;care plan/treatment goals reviewed;risks/benefits reviewed;current/potential barriers reviewed;participants voiced agreement with care plan;participants included;patient   PT Discharge Planning    PT Discharge Recommendation (DC Rec) Transitional Care Facility   PT Rationale for DC Rec Patient currently amb 75 feet needing FWW and functioning below baseline d/t low activity tolerance and desaturating with short distance gait. Recommend continued inpatient PT and PT at TCU to assist pt return to prior level of function. Will continue to assess ability to return home vs. TCU. Pt is hopeful to return home as he feels he is close to baseline however spouse is hopeful  patient can receive more therapy prior to returning home safely.    Total Evaluation Time   Total Evaluation Time (Minutes) 10   Skin WDL   Skin WDL X   Skin Color redness blanchable   Skin Temperature warm   Skin Moisture dry,flaky   Skin Elasticity slow return to original state   Skin Integrity/Characteristics bruised;scab;peeling/scaling   Wound Sacrum Pressure injury community acquired   No Date First Assessed or Time First Assessed found.   Location: Sacrum  Wound Type: Pressure injury community acquired  Pre-existing: (c) Yes   Wound Bed Pink;Erythema, non-blanchable, skin intact   Dressing Foam   Dressing Status Clean, dry, intact

## 2021-06-18 ENCOUNTER — APPOINTMENT (OUTPATIENT)
Dept: GENERAL RADIOLOGY | Facility: CLINIC | Age: 76
DRG: 199 | End: 2021-06-18
Attending: PHYSICIAN ASSISTANT
Payer: COMMERCIAL

## 2021-06-18 ENCOUNTER — APPOINTMENT (OUTPATIENT)
Dept: PHYSICAL THERAPY | Facility: CLINIC | Age: 76
DRG: 199 | End: 2021-06-18
Attending: INTERNAL MEDICINE
Payer: COMMERCIAL

## 2021-06-18 LAB — INTERPRETATION ECG - MUSE: NORMAL

## 2021-06-18 PROCEDURE — 94640 AIRWAY INHALATION TREATMENT: CPT

## 2021-06-18 PROCEDURE — 250N000013 HC RX MED GY IP 250 OP 250 PS 637: Performed by: INTERNAL MEDICINE

## 2021-06-18 PROCEDURE — 999N000157 HC STATISTIC RCP TIME EA 10 MIN

## 2021-06-18 PROCEDURE — 99232 SBSQ HOSP IP/OBS MODERATE 35: CPT | Performed by: INTERNAL MEDICINE

## 2021-06-18 PROCEDURE — 250N000009 HC RX 250: Performed by: INTERNAL MEDICINE

## 2021-06-18 PROCEDURE — 97530 THERAPEUTIC ACTIVITIES: CPT | Mod: GP | Performed by: PHYSICAL THERAPY ASSISTANT

## 2021-06-18 PROCEDURE — 120N000013 HC R&B IMCU

## 2021-06-18 PROCEDURE — 250N000012 HC RX MED GY IP 250 OP 636 PS 637: Performed by: INTERNAL MEDICINE

## 2021-06-18 PROCEDURE — 71046 X-RAY EXAM CHEST 2 VIEWS: CPT

## 2021-06-18 PROCEDURE — 94640 AIRWAY INHALATION TREATMENT: CPT | Mod: 76

## 2021-06-18 RX ADMIN — GUAIFENESIN 600 MG: 600 TABLET, EXTENDED RELEASE ORAL at 21:13

## 2021-06-18 RX ADMIN — PREDNISONE 40 MG: 20 TABLET ORAL at 09:44

## 2021-06-18 RX ADMIN — GUAIFENESIN 600 MG: 600 TABLET, EXTENDED RELEASE ORAL at 09:44

## 2021-06-18 RX ADMIN — FLUTICASONE FUROATE AND VILANTEROL TRIFENATATE 1 PUFF: 200; 25 POWDER RESPIRATORY (INHALATION) at 09:47

## 2021-06-18 RX ADMIN — ROFLUMILAST 250 MCG: 250 TABLET ORAL at 09:44

## 2021-06-18 RX ADMIN — IPRATROPIUM BROMIDE AND ALBUTEROL SULFATE 3 ML: .5; 3 SOLUTION RESPIRATORY (INHALATION) at 16:04

## 2021-06-18 RX ADMIN — Medication 1 MG: at 22:33

## 2021-06-18 RX ADMIN — IPRATROPIUM BROMIDE AND ALBUTEROL SULFATE 3 ML: .5; 3 SOLUTION RESPIRATORY (INHALATION) at 19:20

## 2021-06-18 RX ADMIN — IPRATROPIUM BROMIDE AND ALBUTEROL SULFATE 3 ML: .5; 3 SOLUTION RESPIRATORY (INHALATION) at 07:31

## 2021-06-18 RX ADMIN — IPRATROPIUM BROMIDE AND ALBUTEROL SULFATE 3 ML: .5; 3 SOLUTION RESPIRATORY (INHALATION) at 11:12

## 2021-06-18 ASSESSMENT — ACTIVITIES OF DAILY LIVING (ADL)
ADLS_ACUITY_SCORE: 15

## 2021-06-18 NOTE — PLAN OF CARE
A&O X 4, follows commands, moving all extremities. Up with SBA and walker.  Afebrile VSS 94% on 3 liters, lungs diminished throughout. SOB. Chest tube site dressing CDI. Denied pain. Voiding in urinal.   Skin ryley, bruised. CXR done this morning.

## 2021-06-18 NOTE — CONSULTS
Care Management Initial Consult    General Information  Assessment completed with: Patient, Family, Melissa   Type of CM/SW Visit: Initial Assessment    Primary Care Provider verified and updated as needed: Yes   Readmission within the last 30 days: no previous admission in last 30 days      Reason for Consult: discharge planning  Advance Care Planning: Advance Care Planning Reviewed: other (comment)(Pt reports no formal docs)          Communication Assessment  Patient's communication style: spoken language (English or Bilingual)    Hearing Difficulty or Deaf: no   Wear Glasses or Blind: yes    Cognitive  Cognitive/Neuro/Behavioral: WDL  Level of Consciousness: alert  Arousal Level: opens eyes spontaneously  Orientation: oriented x 4  Mood/Behavior: calm, cooperative  Best Language: 0 - No aphasia  Speech: logical    Living Environment:   People in home: spouse     Current living Arrangements: house      Able to return to prior arrangements: no       Family/Social Support:  Care provided by: self  Provides care for: no one  Marital Status:   Wife, Children  Linda       Description of Support System: Involved, Supportive    Support Assessment: Adequate family and caregiver support    Current Resources:   Patient receiving home care services: No     Community Resources: None  Equipment currently used at home: walker, rolling, none  Supplies currently used at home: None    Employment/Financial:  Employment Status: retired     Employment/ Comments: No  Financial Concerns: No concerns identified   Referral to Financial Counselor: No       Lifestyle & Psychosocial Needs:        Socioeconomic History     Marital status:      Spouse name: Not on file     Number of children: 2     Years of education: Not on file     Highest education level: Not on file   Occupational History     Occupation:      Employer: [employer]     Tobacco Use     Smoking status: Former Smoker     Packs/day:  1.00     Years: 43.00     Pack years: 43.00     Types: Cigarettes     Quit date: 2008     Years since quittin.5     Smokeless tobacco: Never Used   Substance and Sexual Activity     Alcohol use: Yes     Alcohol/week: 0.0 standard drinks     Comment: very rare     Drug use: No     Sexual activity: Not Currently       Functional Status:  Prior to admission patient needed assistance:              Mental Health Status:          Chemical Dependency Status:                Values/Beliefs:  Spiritual, Cultural Beliefs, Synagogue Practices, Values that affect care: no               Additional Information:  LUISA met with pt and his daughter, Marine. Pt states he lives with his wife in their own home. Pt was fairly independent prior to admission. Pt is on oxygen and has been for at least the last three years. Marine did state pt receives assistance with lawn care. Pt reports no formal HCD. SW explained the recommendation for TCU. Pt was receptive, but hopes he has a short stay. SW stated normally pts stay for roughly two weeks. Pt states he is motivated to work on his mobility. Pt and daughter decided on [facility], [place], and [facility] for referral placement. LUISA sent referrals via [facility]. Marine reports her mother is the best  for pt.     LUISA received a call from, Linda, pt's wife. She wanted an update on when pt would discharge. LUISA stated most likely tomorrow for discharge. Linda reports  would be the best day for discharge.     ELIZ Vargas

## 2021-06-18 NOTE — PROGRESS NOTES
A&O X4  Ambulating AX1  Tolerating regular diet  Voiding adequately  Lungs tachypnea, with shallow respirations  Bowels WDL  N/V denies  Pain denies  Telemetry ST  O2 3-4L    CHANGES None

## 2021-06-18 NOTE — PROGRESS NOTES
"SPIRITUAL HEALTH SERVICES Progress Note  [facility]    Visited pt due to length of stay. I introduced myself and SH services to pt and he declined visit, saying \"I'm fine today\". I let him know SH remains available to him during his stay and to let staff know that he would like a visit.      Isha Kuhn  Chaplain Resident    "

## 2021-06-18 NOTE — PROGRESS NOTES
Patient is on 4 lpm NC with an SpO2 in the low 90s. All ordered breathing treatments were given. The patients breath sounds remain diminished through out.     Israel Cano, RT

## 2021-06-18 NOTE — PROGRESS NOTES
[facility]    Medicine Progress Note - Hospitalist Service       Date of Admission:  6/13/2021  Assessment & Plan     Mr. Luu is a 75-year-old male with a past medical history significant for severe oxygen-dependent chronic obstructive pulmonary disease and atrial flutter, status post ablation, currently in sinus rhythm, who presents to the Emergency Department with shortness of breath and found to have a small right pneumothorax.     Acute on chronic hypercapnic respiratory failure due to acute COPD exacerbation and pneumothorax  Spontaneous right pneumothorax with progression to tension pneumothorax on 6/14 s/p chest tube placement   SIRS, resolving: based on HR > 90 bpm and RR > 20 breaths/min, without known infection.  SIRS likely related to above  Seen by thoracic surgery, the pneumothorax at this point is too small to intervene. Follow up CXR after admission showed decreased size from 11mm to 8mm. CXR remained stable am of 6/14 am.  * RRT on evening of 6/14 due to worsening respiration, tachycardia and mental status changes(see RRT note for detail.) CXR showed worsening pneumothorax. ABG showed respiratory acidosis with hypercapnia and hypoxemia. Had chest tube placed at bedside emergently with improving respiratory status and improved mentation.  F/u CXR showed resolution of pneumothorax  -Chest tube have been removed now  -Thoracic surgery was following the patient, no evidence of pneumothorax on this morning's chest x-ray, thoracic surgery now signed off  - continue with Mucinex BID and PTA inhalers  - Duoneb QID  - Solumedrol changed to Prednisone 40mg daily nn 6/15, continue for total 5 days  - anti-tussives prn  - continue supplemental oxygen  - avoid bipap     Acute encephalopathy due to hypercapnia and hypoxemia--resolved  As above, evening of 6/14, patient with worsening respiratory status and mental status changes where he was obtunded per note. See RRT note for detail.  ABG showed hypercapnia and hypoxemia. CXR showed worsening pneumothorax. As above, had Chest tube placed emergently. Mental status improved following that and ABG also showed improvement. He is currently alert and oriented and at his baseline.      New 7 mm right upper lobe lung nodule:   -- Patient can follow up with his PCP for monitoring of this. Needs repeat imaging in 6-12 months     History of atrial flutter, status post ablation:    - intermittent sinus tach     Deconditioning  PT OT recommending TCU,  following for placement     Diet: Regular Diet Adult    DVT Prophylaxis: Pneumatic Compression Devices  Clements Catheter: not present  Code Status: No CPR- Do NOT Intubate           Disposition Plan   Expected discharge: Tomorrow, recommended to transitional care unit once If continued clinical improvement, remained stable and placement found.  Entered: Janice Hobbs MD 06/18/2021, 4:20 PM       The patient's care was discussed with the Bedside Nurse, Patient and Patient's Family.  Discussed with patient's daughter by bedside    Janice Hobbs MD  Hospitalist Service  [facility]  Contact information available via [facility] Paging/Directory    ______________________________________________________________________    Interval History   Patient reports feeling better.  Wanted to go home.  Discussed with him the recommendation from PT OT for safe discharge, he is agreeable.  Denies any new complaints.  Still is some tachypneic and tachycardic but reports that is his baseline.    Data reviewed today: I reviewed all medications, new labs and imaging results over the last 24 hours.  Physical Exam   Vital Signs: Temp: 98.5  F (36.9  C) Temp src: Oral BP: 135/85 Pulse: 103   Resp: 26 SpO2: 91 % O2 Device: Nasal cannula Oxygen Delivery: 3 LPM  Weight: 157 lbs 0 oz  Exam:  Constitutional: Awake, alert and no distress. Appears comfortable  Head: Normocephalic. No masses, lesions, tenderness  or abnormalities  ENT: ENT exam normal, no neck nodes or sinus tenderness  Cardiovascular: Tachycardic but regular, no murmurs, no rubs or JVD  Respiratory: Slightly increased work of breathing with use of accessory muscles of respiration, bilateral decreased air entry, no wheezes or crackles   Gastrointestinal: Abdomen soft, non-tender. BS normal. No masses, organomegaly  : Deferred  Extremities : No edema , no clubbing or cyanosis    Neurologic: Cranial nerves II-XII grossly intact , power symmetrical, Reflexes normal and symmetric. Sensation grossly WNL.      Data   Recent Labs   Lab 06/16/21  0644 06/14/21  2116 06/14/21  0640 06/13/21  0322   WBC 9.2  --  8.3 7.9   HGB 12.3*  --  11.8* 13.7   MCV 99  --  101* 101*     --  266 315     --  135 135   POTASSIUM 3.9  --  5.0 4.6   CHLORIDE 99  --  102 100   CO2 36*  --  35* 33*   BUN 30  --  23 21   CR 0.75  --  0.68 0.77   ANIONGAP <1*  --  <1* 2*   SAMIRA 8.5  --  9.2 9.1   GLC 95  --  129* 112*   ALBUMIN  --   --   --  3.7   PROTTOTAL  --   --   --  7.4   BILITOTAL  --   --   --  0.4   ALKPHOS  --   --   --  77   ALT  --   --   --  18   AST  --   --   --  16   TROPI  --  0.022  --  <0.015     Recent Results (from the past 24 hour(s))   XR Chest 2 Views    Narrative    EXAM: XR CHEST 2 VW  LOCATION: [facility]  DATE/TIME: 6/18/2021 6:30 AM    INDICATION: Status post right-sided chest tube removal.  COMPARISON: 06/17/2021      Impression    IMPRESSION: Heart size is normal. Hyperinflated, emphysematous lungs. Chest tube has been removed. No visible pneumothorax. Blunting of the costophrenic angles likely reflects pleural thickening or small effusions.     Medications     - MEDICATION INSTRUCTIONS -         fluticasone-vilanterol  1 puff Inhalation Daily     guaiFENesin  600 mg Oral BID     ipratropium - albuterol 0.5 mg/2.5 mg/3 mL  3 mL Nebulization 4x daily     predniSONE  40 mg Oral Daily     roflumilast  250 mcg Oral Daily

## 2021-06-19 ENCOUNTER — APPOINTMENT (OUTPATIENT)
Dept: PHYSICAL THERAPY | Facility: CLINIC | Age: 76
DRG: 199 | End: 2021-06-19
Payer: COMMERCIAL

## 2021-06-19 ENCOUNTER — APPOINTMENT (OUTPATIENT)
Dept: CARDIOLOGY | Facility: CLINIC | Age: 76
DRG: 199 | End: 2021-06-19
Attending: INTERNAL MEDICINE
Payer: COMMERCIAL

## 2021-06-19 LAB
ANION GAP SERPL CALCULATED.3IONS-SCNC: <1 MMOL/L (ref 3–14)
BUN SERPL-MCNC: 26 MG/DL (ref 7–30)
CALCIUM SERPL-MCNC: 8.6 MG/DL (ref 8.5–10.1)
CHLORIDE SERPL-SCNC: 100 MMOL/L (ref 94–109)
CO2 SERPL-SCNC: 37 MMOL/L (ref 20–32)
CREAT SERPL-MCNC: 0.68 MG/DL (ref 0.66–1.25)
GFR SERPL CREATININE-BSD FRML MDRD: >90 ML/MIN/{1.73_M2}
GLUCOSE SERPL-MCNC: 101 MG/DL (ref 70–99)
MAGNESIUM SERPL-MCNC: 1.9 MG/DL (ref 1.6–2.3)
POTASSIUM SERPL-SCNC: 4.1 MMOL/L (ref 3.4–5.3)
SODIUM SERPL-SCNC: 137 MMOL/L (ref 133–144)
TSH SERPL DL<=0.005 MIU/L-ACNC: 0.5 MU/L (ref 0.4–4)

## 2021-06-19 PROCEDURE — 250N000013 HC RX MED GY IP 250 OP 250 PS 637: Performed by: INTERNAL MEDICINE

## 2021-06-19 PROCEDURE — 999N000208 ECHOCARDIOGRAM COMPLETE

## 2021-06-19 PROCEDURE — 255N000002 HC RX 255 OP 636: Performed by: INTERNAL MEDICINE

## 2021-06-19 PROCEDURE — 36415 COLL VENOUS BLD VENIPUNCTURE: CPT | Performed by: INTERNAL MEDICINE

## 2021-06-19 PROCEDURE — 97116 GAIT TRAINING THERAPY: CPT | Mod: GP | Performed by: PHYSICAL THERAPY ASSISTANT

## 2021-06-19 PROCEDURE — 99233 SBSQ HOSP IP/OBS HIGH 50: CPT | Performed by: INTERNAL MEDICINE

## 2021-06-19 PROCEDURE — 93306 TTE W/DOPPLER COMPLETE: CPT | Mod: 26 | Performed by: INTERNAL MEDICINE

## 2021-06-19 PROCEDURE — 80048 BASIC METABOLIC PNL TOTAL CA: CPT | Performed by: INTERNAL MEDICINE

## 2021-06-19 PROCEDURE — 120N000013 HC R&B IMCU

## 2021-06-19 PROCEDURE — 84443 ASSAY THYROID STIM HORMONE: CPT | Performed by: INTERNAL MEDICINE

## 2021-06-19 PROCEDURE — 250N000009 HC RX 250: Performed by: INTERNAL MEDICINE

## 2021-06-19 PROCEDURE — 250N000012 HC RX MED GY IP 250 OP 636 PS 637: Performed by: INTERNAL MEDICINE

## 2021-06-19 PROCEDURE — 93005 ELECTROCARDIOGRAM TRACING: CPT

## 2021-06-19 PROCEDURE — 99222 1ST HOSP IP/OBS MODERATE 55: CPT | Mod: 25 | Performed by: INTERNAL MEDICINE

## 2021-06-19 PROCEDURE — 97530 THERAPEUTIC ACTIVITIES: CPT | Mod: GP | Performed by: PHYSICAL THERAPY ASSISTANT

## 2021-06-19 PROCEDURE — 83735 ASSAY OF MAGNESIUM: CPT | Performed by: INTERNAL MEDICINE

## 2021-06-19 RX ORDER — DIGOXIN 0.25 MG/ML
250 INJECTION INTRAMUSCULAR; INTRAVENOUS EVERY 6 HOURS SCHEDULED
Status: DISCONTINUED | OUTPATIENT
Start: 2021-06-19 | End: 2021-06-19

## 2021-06-19 RX ORDER — METOPROLOL TARTRATE 25 MG/1
25 TABLET, FILM COATED ORAL 2 TIMES DAILY
Status: DISCONTINUED | OUTPATIENT
Start: 2021-06-19 | End: 2021-06-20 | Stop reason: HOSPADM

## 2021-06-19 RX ORDER — METOPROLOL TARTRATE 1 MG/ML
5 INJECTION, SOLUTION INTRAVENOUS ONCE
Status: COMPLETED | OUTPATIENT
Start: 2021-06-19 | End: 2021-06-19

## 2021-06-19 RX ORDER — METOPROLOL TARTRATE 1 MG/ML
5 INJECTION, SOLUTION INTRAVENOUS EVERY 4 HOURS PRN
Status: DISCONTINUED | OUTPATIENT
Start: 2021-06-19 | End: 2021-06-19

## 2021-06-19 RX ORDER — DIGOXIN 0.25 MG/ML
125 INJECTION INTRAMUSCULAR; INTRAVENOUS ONCE
Status: DISCONTINUED | OUTPATIENT
Start: 2021-06-19 | End: 2021-06-19

## 2021-06-19 RX ORDER — METOPROLOL TARTRATE 1 MG/ML
2.5 INJECTION, SOLUTION INTRAVENOUS EVERY 4 HOURS PRN
Status: DISCONTINUED | OUTPATIENT
Start: 2021-06-19 | End: 2021-06-19

## 2021-06-19 RX ORDER — LEVALBUTEROL 1.25 MG/.5ML
1.25 SOLUTION, CONCENTRATE RESPIRATORY (INHALATION) EVERY 6 HOURS PRN
Status: DISCONTINUED | OUTPATIENT
Start: 2021-06-19 | End: 2021-06-20 | Stop reason: HOSPADM

## 2021-06-19 RX ADMIN — METOPROLOL TARTRATE 25 MG: 25 TABLET, FILM COATED ORAL at 20:56

## 2021-06-19 RX ADMIN — METOPROLOL TARTRATE 5 MG: 1 INJECTION, SOLUTION INTRAVENOUS at 16:48

## 2021-06-19 RX ADMIN — ACETAMINOPHEN 650 MG: 325 TABLET, FILM COATED ORAL at 05:07

## 2021-06-19 RX ADMIN — GUAIFENESIN 600 MG: 600 TABLET, EXTENDED RELEASE ORAL at 20:56

## 2021-06-19 RX ADMIN — GUAIFENESIN 600 MG: 600 TABLET, EXTENDED RELEASE ORAL at 08:29

## 2021-06-19 RX ADMIN — HUMAN ALBUMIN MICROSPHERES AND PERFLUTREN 9 ML: 10; .22 INJECTION, SOLUTION INTRAVENOUS at 14:20

## 2021-06-19 RX ADMIN — Medication 1 MG: at 22:41

## 2021-06-19 RX ADMIN — METOPROLOL TARTRATE 5 MG: 5 INJECTION INTRAVENOUS at 08:16

## 2021-06-19 RX ADMIN — METOPROLOL TARTRATE 2.5 MG: 1 INJECTION, SOLUTION INTRAVENOUS at 05:00

## 2021-06-19 RX ADMIN — ROFLUMILAST 250 MCG: 250 TABLET ORAL at 08:29

## 2021-06-19 RX ADMIN — FLUTICASONE FUROATE AND VILANTEROL TRIFENATATE 1 PUFF: 200; 25 POWDER RESPIRATORY (INHALATION) at 10:42

## 2021-06-19 RX ADMIN — METOPROLOL TARTRATE 2.5 MG: 1 INJECTION, SOLUTION INTRAVENOUS at 12:14

## 2021-06-19 RX ADMIN — METOPROLOL TARTRATE 12.5 MG: 25 TABLET, FILM COATED ORAL at 07:56

## 2021-06-19 RX ADMIN — PREDNISONE 40 MG: 20 TABLET ORAL at 08:29

## 2021-06-19 ASSESSMENT — ACTIVITIES OF DAILY LIVING (ADL)
ADLS_ACUITY_SCORE: 15

## 2021-06-19 NOTE — PROGRESS NOTES
[facility]    Medicine Progress Note - Hospitalist Service       Date of Admission:  6/13/2021  Assessment & Plan     Mr. Luu is a 75-year-old male with a past medical history significant for severe oxygen-dependent chronic obstructive pulmonary disease and atrial flutter, status post ablation, currently in sinus rhythm, who presents to the Emergency Department with shortness of breath and found to have a small right pneumothorax.     Acute on chronic hypercapnic respiratory failure due to acute COPD exacerbation and pneumothorax  Spontaneous right pneumothorax with progression to tension pneumothorax on 6/14 s/p chest tube placement   SIRS, resolving: based on HR > 90 bpm and RR > 20 breaths/min, without known infection.  SIRS likely related to above  Seen by thoracic surgery, the pneumothorax at this point is too small to intervene. Follow up CXR after admission showed decreased size from 11mm to 8mm. CXR remained stable am of 6/14 am.  * RRT on evening of 6/14 due to worsening respiration, tachycardia and mental status changes(see RRT note for detail.) CXR showed worsening pneumothorax. ABG showed respiratory acidosis with hypercapnia and hypoxemia. Had chest tube placed at bedside emergently with improving respiratory status and improved mentation.  F/u CXR showed resolution of pneumothorax  -Chest tube have been removed now  -Thoracic surgery was following the patient, no evidence of pneumothorax on this morning's chest x-ray, thoracic surgery now signed off(6/18)  - continue with Mucinex BID and PTA inhalers  -We will change DuoNeb to Xopenex due to RVR as below  - Solumedrol changed to Prednisone 40mg daily nn 6/15, continue for total 5 days  - anti-tussives prn  - continue supplemental oxygen  - avoid bipap     A. fib with RVR  History of atrial flutter, status post ablation:    -Patient has history of atrial flutter in 2015 and had ablation.  He had been doing fine until  recently.  Not on any medication PTA.  -During this hospitalization was intermittently on sinus tach  -Overnight/early this morning patient went into A. fib with RVR and became symptomatic with extreme fatigue and dizziness  -Metoprolol 12.5 mg twice daily, increased to 25 mg twice daily if tolerates  -As needed metoprolol, increased to 5 mg  -Electrolyte including potassium and magnesium normal  -Check echo, TSH  -Cardiology consult  -Pharmacy for medication coverage    Acute encephalopathy due to hypercapnia and hypoxemia--resolved  As above, evening of 6/14, patient with worsening respiratory status and mental status changes where he was obtunded per note. See RRT note for detail. ABG showed hypercapnia and hypoxemia. CXR showed worsening pneumothorax. As above, had Chest tube placed emergently. Mental status improved following that and ABG also showed improvement. He is currently alert and oriented and at his baseline.      New 7 mm right upper lobe lung nodule:   -- Patient can follow up with his PCP for monitoring of this. Needs repeat imaging in 6-12 months     Deconditioning  PT OT recommending TCU,  following for placement    Total time spent 35 minutes with more than 50% of time in counseling and coordination of care      Diet: Regular Diet Adult    DVT Prophylaxis: Pneumatic Compression Devices  Clements Catheter: not present  Code Status: No CPR- Do NOT Intubate           Disposition Plan   Expected discharge: 1-2 days, recommended to transitional care unit once heart rate better controlled, continued clinical improvement and cleared by cardiology  Entered: Janice Hobbs MD 06/19/2021, 8:49 AM       The patient's care was discussed with the Bedside Nurse, Patient .  Discussed with patient's daughter by bedside(6/18), called his wife Linda to update, questions answered    Janice Hobbs MD  Hospitalist Service  [facility]  Contact information available via [facility]  [place]/Directory    ______________________________________________________________________    Interval History   Patient went into A. fib with RVR overnight.  This morning he continued to be in A. fib with RVR.  When I went to evaluate him he reported feeling extremely fatigued and tired, somewhat dizzy, however denied any palpitation or chest pain.  Denies any shortness of breath.    Data reviewed today: I reviewed all medications, new labs and imaging results over the last 24 hours.  Physical Exam   Vital Signs: Temp: 97.8  F (36.6  C) Temp src: Oral BP: 116/63 Pulse: 125   Resp: 21 SpO2: 94 % O2 Device: Nasal cannula Oxygen Delivery: 3 LPM  Weight: 157 lbs 0 oz  Exam:  Constitutional: Awake, alert and no distress. Appears comfortable  Head: Normocephalic. No masses, lesions, tenderness or abnormalities  ENT: ENT exam normal, no neck nodes or sinus tenderness  Cardiovascular: Tachycardic, irregular, no murmurs, no rubs or JVD  Respiratory: Slightly increased work of breathing with use of accessory muscles of respiration, bilateral decreased air entry, no wheezes or crackles   Gastrointestinal: Abdomen soft, non-tender. BS normal. No masses, organomegaly  : Deferred  Extremities : No edema , no clubbing or cyanosis    Neurologic: Cranial nerves II-XII grossly intact , power symmetrical, Reflexes normal and symmetric. Sensation grossly WNL.      Data   Recent Labs   Lab 06/19/21  0731 06/16/21  0644 06/14/21  2116 06/14/21  0640 06/13/21  0322   WBC  --  9.2  --  8.3 7.9   HGB  --  12.3*  --  11.8* 13.7   MCV  --  99  --  101* 101*   PLT  --  238  --  266 315    135  --  135 135   POTASSIUM 4.1 3.9  --  5.0 4.6   CHLORIDE 100 99  --  102 100   CO2 37* 36*  --  35* 33*   BUN 26 30  --  23 21   CR 0.68 0.75  --  0.68 0.77   ANIONGAP <1* <1*  --  <1* 2*   SAMIRA 8.6 8.5  --  9.2 9.1   * 95  --  129* 112*   ALBUMIN  --   --   --   --  3.7   PROTTOTAL  --   --   --   --  7.4   BILITOTAL  --   --   --   --   0.4   ALKPHOS  --   --   --   --  77   ALT  --   --   --   --  18   AST  --   --   --   --  16   TROPI  --   --  0.022  --  <0.015     No results found for this or any previous visit (from the past 24 hour(s)).  Medications     - MEDICATION INSTRUCTIONS -         fluticasone-vilanterol  1 puff Inhalation Daily     guaiFENesin  600 mg Oral BID     metoprolol tartrate  12.5 mg Oral BID     roflumilast  250 mcg Oral Daily

## 2021-06-19 NOTE — PROGRESS NOTES
MD Notification    Notified Person: MD    Notified Person Name: Dr. Ayala     Notification Date/Time: 6/19/2021 1840    Notification Interaction:paged    Purpose of Notification: pt converted to sInus tach w/ PVC's. Do you still want digoxin given? Thanks!     Orders Received:    Comments:

## 2021-06-19 NOTE — PROGRESS NOTES
X-cover    Pt in A fib. EKG without ischemic changes. Rates 's asymptomatic and BP's stable  - prn metoprolol HR>120  - defer a/c to day team    Amos Heard MD

## 2021-06-19 NOTE — PROGRESS NOTES
MD Notification    Notified Person: MD    Notified Person Name: Dr. Hobbs    Notification Date/Time: 6/19/2021    Notification Interaction:1630    Purpose of Notification: FYI: Patient still in Afib RVR with increasing PVC's. Asymptomatic. Ordering 12 lead EKG.     Orders Received:    Comments:

## 2021-06-19 NOTE — PLAN OF CARE
A&Ox4, VSS ex tachycardic (at times) on 3 L NC. Tele NSR, pt flipped into rapid A.fib at 0315, MD notified. Pt denies having pain. Adequate I&O's. Tolerating regular diet. LS diminished with HENRIQUEZ. +BS, +flatus, -BM.

## 2021-06-19 NOTE — PROGRESS NOTES
"MD Notification    Notified Person: MD    Notified Person Name: Dr. Hobbs    Notification Date/Time:6/19/2021 0800    Notification Interaction: page    Purpose of Notification:Rapid A fib. Pt symptomatic- extreme fatigue, \"feeling off\"  (120-130's) PRN dose not avail (too soon). am PO dose already given. Anything else? thanks    Orders Received: Patient came to see patient. 5mg IV metoprolol once. MD increased PO metoprolol daily dose and PRN dosage from 2.5 to 5mg.     Comments:Nursing will continue to monitor closely.     "

## 2021-06-20 VITALS
HEIGHT: 72 IN | TEMPERATURE: 98.5 F | SYSTOLIC BLOOD PRESSURE: 90 MMHG | DIASTOLIC BLOOD PRESSURE: 49 MMHG | WEIGHT: 157 LBS | BODY MASS INDEX: 21.26 KG/M2 | OXYGEN SATURATION: 94 % | RESPIRATION RATE: 18 BRPM | HEART RATE: 73 BPM

## 2021-06-20 LAB
LABORATORY COMMENT REPORT: NORMAL
PLATELET # BLD AUTO: 258 10E9/L (ref 150–450)
SARS-COV-2 RNA RESP QL NAA+PROBE: NEGATIVE
SPECIMEN SOURCE: NORMAL

## 2021-06-20 PROCEDURE — 36415 COLL VENOUS BLD VENIPUNCTURE: CPT | Performed by: INTERNAL MEDICINE

## 2021-06-20 PROCEDURE — 250N000013 HC RX MED GY IP 250 OP 250 PS 637: Performed by: INTERNAL MEDICINE

## 2021-06-20 PROCEDURE — 87635 SARS-COV-2 COVID-19 AMP PRB: CPT | Performed by: INTERNAL MEDICINE

## 2021-06-20 PROCEDURE — 99239 HOSP IP/OBS DSCHRG MGMT >30: CPT | Performed by: INTERNAL MEDICINE

## 2021-06-20 PROCEDURE — 85049 AUTOMATED PLATELET COUNT: CPT | Performed by: INTERNAL MEDICINE

## 2021-06-20 RX ORDER — GUAIFENESIN/DEXTROMETHORPHAN 100-10MG/5
5 SYRUP ORAL EVERY 4 HOURS PRN
DISCHARGE
Start: 2021-06-20 | End: 2021-07-06

## 2021-06-20 RX ORDER — METOPROLOL TARTRATE 25 MG/1
25 TABLET, FILM COATED ORAL 2 TIMES DAILY
DISCHARGE
Start: 2021-06-20 | End: 2021-06-21

## 2021-06-20 RX ORDER — GUAIFENESIN 600 MG/1
600 TABLET, EXTENDED RELEASE ORAL 2 TIMES DAILY PRN
DISCHARGE
Start: 2021-06-20 | End: 2021-07-06

## 2021-06-20 RX ORDER — CARBOXYMETHYLCELLULOSE SODIUM 5 MG/ML
1 SOLUTION/ DROPS OPHTHALMIC
DISCHARGE
Start: 2021-06-20 | End: 2021-07-06

## 2021-06-20 RX ORDER — ACETAMINOPHEN 325 MG/1
650 TABLET ORAL EVERY 4 HOURS PRN
DISCHARGE
Start: 2021-06-20

## 2021-06-20 RX ADMIN — APIXABAN 5 MG: 5 TABLET, FILM COATED ORAL at 12:49

## 2021-06-20 RX ADMIN — FLUTICASONE FUROATE AND VILANTEROL TRIFENATATE 1 PUFF: 200; 25 POWDER RESPIRATORY (INHALATION) at 09:03

## 2021-06-20 RX ADMIN — ROFLUMILAST 250 MCG: 250 TABLET ORAL at 09:03

## 2021-06-20 RX ADMIN — GUAIFENESIN 600 MG: 600 TABLET, EXTENDED RELEASE ORAL at 09:03

## 2021-06-20 RX ADMIN — METOPROLOL TARTRATE 25 MG: 25 TABLET, FILM COATED ORAL at 09:03

## 2021-06-20 RX ADMIN — ACETAMINOPHEN 650 MG: 325 TABLET, FILM COATED ORAL at 03:49

## 2021-06-20 ASSESSMENT — ACTIVITIES OF DAILY LIVING (ADL)
ADLS_ACUITY_SCORE: 15

## 2021-06-20 NOTE — DISCHARGE SUMMARY
[facility]  Hospitalist Discharge Summary      Date of Admission:  6/13/2021  Date of Discharge:  6/20/2021  Discharging Provider: Janice Hobbs MD      Discharge Diagnoses   Acute on chronic hypercapnic respiratory failure due to COPD exacerbation and spontaneous pneumothorax  Spontaneous right pneumothorax with progression to tension pneumothorax status post chest tube placement, now resolved  Systemic inflammatory response syndrome secondary to above, resolved  Atrial fibrillation with RVR, sinus rhythm at the time of discharge  History of atrial flutter, status post ablation  Acute metabolic encephalopathy secondary to hypercapnia/hypoxemia, resolved  7 mm right upper lobe lung nodule  Deconditioning      Follow-ups Needed After Discharge   Follow-up Appointments     Follow Up and recommended labs and tests      Follow up with Nursing home physician.   Follow-up with PCP once discharged from TCU.  CT scan of the chest in 6 to   12 months to follow-up on 7 mm lung nodule noted on yo CT scan during   admission  Follow-up with Dr. Barboza next available  Follow-up with cardiology next available           Unresulted Labs Ordered in the Past 30 Days of this Admission     Date and Time Order Name Status Description    6/20/2021 1130 Platelet count In process           Discharge Disposition   Discharged to short-term care facility  Condition at discharge: Stable    Hospital Course       Mr. Luu is a 75-year-old male with a past medical history significant for severe oxygen-dependent chronic obstructive pulmonary disease and atrial flutter, status post ablation, currently in sinus rhythm, who presents to the Emergency Department with shortness of breath and found to have a small right pneumothorax.     Acute on chronic hypercapnic respiratory failure due to acute COPD exacerbation and pneumothorax  Spontaneous right pneumothorax with progression to tension pneumothorax on 6/14 s/p chest  tube placement   SIRS, resolving: based on HR > 90 bpm and RR > 20 breaths/min, without known infection.  SIRS likely related to above  Initially by thoracic surgery, at that point pneumothorax was too small to intervene.  However patient had RRT on evening of 6/14 due to worsening respiration, tachycardia and mental status changes(see RRT note for detail.) CXR showed worsening pneumothorax. ABG showed respiratory acidosis with hypercapnia and hypoxemia. Had chest tube placed at bedside emergently with improving respiratory status and improved mentation.  F/u CXR showed resolution of pneumothorax  -Chest tube have been removed now, patient remains stable more than 72 hours after removal of chest tube  - continue with Mucinex BID and PTA inhalers  -Was initially treated with Solu-Medrol, completed prednisone course  - anti-tussives prn  - continue supplemental oxygen  - avoid bipap     A. fib with RVR  History of atrial flutter, status post ablation:    -Patient has history of atrial flutter in 2015 and had ablation.  He had been doing fine until recently.  Not on any medication PTA.  -During this hospitalization was intermittently on sinus tach, however early morning on 6/19 patient went into A. fib with RVR and became symptomatic with extreme fatigue and dizziness  -Metoprolol 25 mg twice daily with holding parameters  -Electrolyte including potassium and magnesium normal, TSH normal, echo with mild to moderate pulmonary hypertension, normal EF and moderate disease moderate severe tricuspid regurgitation  -Cardiology evaluated the patient, [id number] score of 3, recommended anticoagulation  -Eliquis co-pay $122/month while working towards deductible was communicated with the patient and wife and both of them agreed      Acute metabolic encephalopathy due to hypercapnia and hypoxemia--resolved  As above, evening of 6/14, patient with worsening respiratory status and mental status changes where he was obtunded per  note. See RRT note for detail. ABG showed hypercapnia and hypoxemia. CXR showed worsening pneumothorax. As above, had Chest tube placed emergently. Mental status improved following that and ABG also showed improvement. He is currently alert and oriented and at his baseline.      New 7 mm right upper lobe lung nodule:   -- Patient can follow up with his PCP for monitoring of this. Needs repeat imaging in 6-12 months     Deconditioning  PT OT recommending TCU,  following for placement    Consultations This Hospital Stay   THORACIC SURGERY IP CONSULT  PHYSICAL THERAPY ADULT IP CONSULT  CARE MANAGEMENT / SOCIAL WORK IP CONSULT  CARDIOLOGY IP CONSULT  CARDIOLOGY IP CONSULT  PHARMACY LIAISON FOR MEDICATION COVERAGE CONSULT  PHYSICAL THERAPY PEDS IP CONSULT  OCCUPATIONAL THERAPY PEDS IP CONSULT    Code Status   No CPR- Do NOT Intubate    Time Spent on this Encounter   I, Janice Hobbs MD, personally saw the patient today and spent greater than 30 minutes discharging this patient.       Janice Hobbs MD  [place] [id number] [facility]  [address] [place] [address]  Phone: [phone]  ______________________________________________________________________    Physical Exam   Vital Signs: Temp: 98.5  F (36.9  C) Temp src: Oral BP: 90/49 Pulse: 73   Resp: 18 SpO2: 94 % O2 Device: Nasal cannula Oxygen Delivery: 3 LPM  Weight: 157 lbs 0 oz  Exam:  Constitutional: Awake, alert and no distress. Appears comfortable  Head: Normocephalic. No masses, lesions, tenderness or abnormalities  ENT: ENT exam normal, no neck nodes or sinus tenderness  Cardiovascular: RRR.  2+ murmurs, no rubs or JVD  Respiratory: Normal WOB, decreased air entry bilaterally, dressing at recent chest tube site  Gastrointestinal: Abdomen soft, non-tender. BS normal. No masses, organomegaly  : Deferred  Extremities : No edema , no clubbing or cyanosis    Neurologic: Cranial nerves II-XII grossly intact , power  symmetrical, Reflexes normal and symmetric. Sensation grossly WNL.         Primary Care Physician   Filemon Fletcher    Discharge Orders      General info for SNF    Length of Stay Estimate: Short Term Care: Estimated # of Days <30  Condition at Discharge: Improving  Level of care:skilled   Rehabilitation Potential: Good  Admission H&P remains valid and up-to-date: Yes  Recent Chemotherapy: N/A  Use Nursing Home Standing Orders: Yes     Mantoux instructions    Give two-step Mantoux (PPD) Per Facility Policy Yes     Reason for your hospital stay    Acute respiratory failure secondary to spontaneous pneumothorax progressing to tension pneumothorax, treated with chest tube decompression  Later you developed A. fib with RVR, now you have converted back     Intake and output    Every shift     Follow Up and recommended labs and tests    Follow up with Nursing home physician.   Follow-up with PCP once discharged from TCU.  CT scan of the chest in 6 to 12 months to follow-up on 7 mm lung nodule noted on yorr CT scan during admission  Follow-up with Dr. Barboza next available  Follow-up with cardiology next available     Activity - Up with assistive device     No CPR- Do NOT Intubate    As documented during admission     Physical Therapy Peds Consult    Evaluate and treat as clinically indicated.    Reason: Physical deconditioning     Occupational Therapy Peds Consult    Evaluate and treat as clinically indicated.    Reason: Physical deconditioning     Fall precautions     Oxygen Adult/Peds     Advance Diet as Tolerated    Follow this diet upon discharge: Orders Placed This Encounter      Regular Diet Adult       Significant Results and Procedures   Results for orders placed or performed during the hospital encounter of 06/13/21   XR Chest Port 1 View    Narrative    EXAM: XR CHEST PORTABLE 1 VIEW  LOCATION: [facility]  DATE/TIME: 06/13/2021, 3:59 AM    INDICATION: Shortness of breath. COPD.  COMPARISON:  06/28/2018.      Impression    IMPRESSION: COPD. Cardiomediastinal silhouette within normal limits. Small right pneumothorax. No vascular congestion.      Results given to Dr. Topete at 0421   Chest CT w/o contrast    Narrative    EXAM: CT CHEST WITHOUT CONTRAST  LOCATION: [facility]  DATE/TIME: 06/13/2021, 5:47 AM    INDICATION: Shortness of breath. COPD exacerbation. Right pneumothorax.  COMPARISON: Chest x-ray 06/13/2021. CT 10/10/2018.  TECHNIQUE: CT chest without IV contrast. Multiplanar reformats were obtained. Dose reduction techniques were used.  CONTRAST: None.    FINDINGS:   LUNGS AND PLEURA: There is a small right pneumothorax. Advanced emphysematous disease in the upper lungs. There is a new 7 mm nodule in the right upper lobe laterally on series 3 image 26. This has irregular margins. Mild nodularity along the left major   fissure. Dependent atelectasis bilaterally. A few bands of scar or atelectasis at the lung bases.    MEDIASTINUM/AXILLAE: No lymph node enlargement. The trachea and main bronchi are narrowed in the AP dimension. The heart size is normal.    CORONARY ARTERY CALCIFICATION: Moderate.    UPPER ABDOMEN: Right renal stone. No hydronephrosis.    MUSCULOSKELETAL: Degenerative disease in the spine.      Impression    IMPRESSION:   1.  Small right pneumothorax.  2.  New 7 mm right upper lobe lung nodule. Follow-up recommended.  3.  Emphysema.  4.  Coronary artery atherosclerotic calcifications.    Recommendations for an incidental lung nodule = or > 6mm to 8mm:    Low risk patients: Initial follow-up CT at 6-12 months, then consider CT at 18-24 months if no change.    High risk patients: Initial follow-up CT at 6-12 months, then CT at 18-24 months if no change.    *Low Risk: Minimal or absent history of smoking or other known risk factors.  *Nonsolid (ground-glass) or partly solid nodules may require longer follow-up to exclude indolent adenocarcinoma.  *Recommendations based on  Guidelines for the Management of Incidental Pulmonary Nodules Detected at CT: From the Fleischner Society 2017, Radiology 2017.     XR Chest Port 1 View    Narrative    CHEST PORTABLE ONE VIEW June 13, 2021 7:44 AM     HISTORY: Increased shortness of breath, evaluate for worsening right  pneumothorax.    COMPARISON: 6/13/2021.      Impression    IMPRESSION: Stable right lateral apex pneumothorax with a size of 1.1  cm. No new airspace disease. No mediastinal shift. Stable cardiac  silhouette.    KAMAR TOSCANO MD   XR Chest Port 1 View    Narrative    CHEST PORTABLE ONE VIEW June 13, 2021 11:45 AM     HISTORY: Pneumothorax, assess for evolution.    COMPARISON: Chest x-ray 6/13/2021.      Impression    IMPRESSION: Slightly smaller right upper lateral apical pneumothorax  with a size of 8 mm, previously 11 mm. Remainder of the scan is  stable.    KAMAR TOSCANO MD   XR Chest Port 1 View    Narrative    XR CHEST PORT 1 VIEW 6/14/2021 11:52 AM    HISTORY: ptx followup    COMPARISON: 6/13/2021      Impression    IMPRESSION: Stable small pneumothorax at the right lung apex, lateral  right lung and right lung base. Emphysema. No infiltrate or pleural  effusion. Normal heart size.    CALROS FELDMAN MD   XR Chest Port 1 View     Value    Radiologist flags Enlarging pneumothorax (Urgent)    Narrative    XR CHEST PORT 1 VIEW  6/14/2021 9:06 PM       INDICATION: increased work of breathing, HTN, tachycardia, known  stable PTX  COMPARISON: 6/14/2021 at 1112 hours       Impression    IMPRESSION: Increase in size of now moderate sized right pneumothorax.  No shift of mediastinal structures.    [Urgent Result: Enlarging pneumothorax]    Finding was identified on 6/14/2021 9:13 PM.     The patient's nurse Namrata was contacted by me on 6/14/2021 9:17 PM and  verbalized understanding of the critical result.    DEBORAH BHANDARI MD   XR Chest Port 1 View    Narrative    XR CHEST PORT 1 VIEW  6/14/2021 9:55 PM       INDICATION: post chest tube  placement  COMPARISON: 2021 at 2052 hours       Impression    IMPRESSION: Small caliber right chest tube has been inserted with  significant interval decrease in size of now small right pneumothorax.    DEBORAH BHANDARI MD   XR Chest Port 1 View    Narrative    EXAM: XR CHEST PORTABLE 1 VIEW  LOCATION: [facility]  DATE/TIME: 2021, 4:54 AM    INDICATION: Pneumothorax.  COMPARISON: 2021.      Impression    IMPRESSION: Small bore right chest tube in stable position. Probable small right basilar pneumothorax. Slight scattered linear scarring or atelectasis. No acute infiltrates or consolidation. Normal heart size and pulmonary vascularity. No significant   bony abnormalities. Remainder unremarkable.     XR Chest Port 1 View    Narrative    EXAM: XR CHEST PORT 1 VIEW  LOCATION: [facility]  DATE/TIME: 2021 5:05 AM    INDICATION: Pneumothorax.  COMPARISON: 2021.      Impression    IMPRESSION: Smallbore right chest tube in stable position. No definite pneumothorax. Normal heart size and pulmonary vascularity. Slight scattered scarring or atelectasis in the lungs. No acute-appearing infiltrates or consolidation. Probable tiny   bilateral pleural effusions. No significant bony abnormalities.             XR Chest 2 Views    Narrative    EXAM: XR CHEST 2 VW  LOCATION: [facility]  DATE/TIME: 2021 6:30 AM    INDICATION: Status post right-sided chest tube removal.  COMPARISON: 2021      Impression    IMPRESSION: Heart size is normal. Hyperinflated, emphysematous lungs. Chest tube has been removed. No visible pneumothorax. Blunting of the costophrenic angles likely reflects pleural thickening or small effusions.   Echocardiogram Complete    Narrative    [id number]  [id number]  [id number]  [id number]^CHELSEY^CATINA     [facility]  Echocardiography Laboratory  [address] [place] [id number]     Name: KAMALA BRIGGS  MRN: [id number]  :  1945  Study Date: 06/19/2021 01:43 PM  Age: 75 yrs  Gender: Male  Patient Location: [facility]  Reason For Study: Atrial Fibrillation  Ordering Physician: CATINA BARBOSA  Referring Physician: Filemon Fletcher  Performed By: Lolis Burdick     BSA: 1.9 m2  Height: 72 in  Weight: 157 lb  HR: 113  BP: 116/63 mmHg  ______________________________________________________________________________  Procedure  Complete Portable Echo Adult. Optison (NDC #[id number]) given intravenously.  ______________________________________________________________________________  Interpretation Summary     The visual ejection fraction is estimated at 55-60%.  There is moderate to mod-severe (2-3+) tricuspid regurgitation.  Right ventricular systolic pressure is elevated, consistent with mild to  moderate pulmonary hypertension.  The study was technically difficult. Contrast was used without apparent  complications.  ______________________________________________________________________________  Left Ventricle  The left ventricle is normal in size. There is normal left ventricular wall  thickness. The visual ejection fraction is estimated at 55-60%. Diastolic  function not assessed due to atrial fibrillation. Normal left ventricular wall  motion. No evidence of left ventricular mass or tumors.     Right Ventricle  The right ventricle is mildly dilated. Right ventricular function cannot be  assessed due to poor image quality.     Atria  Normal left atrial size. Right atrium not well visualized.     Mitral Valve  The mitral valve leaflets appear normal. There is no evidence of stenosis,  fluttering, or prolapse.     Tricuspid Valve  Tricuspid leaflets are thickened. There is moderate to mod-severe (2-3+)  tricuspid regurgitation. Right ventricular systolic pressure is elevated,  consistent with mild to moderate pulmonary hypertension.     Aortic Valve  There is mild trileaflet aortic sclerosis.     Pulmonic Valve  Normal pulmonic valve.      Vessels  The aortic root is normal size. Dilation of the inferior vena cava is present  with normal respiratory variation in diameter.     Pericardium  The pericardium appears normal.     Rhythm  The rhythm was rapid atrial fibrillation.  ______________________________________________________________________________  MMode/2D Measurements & Calculations  IVSd: 1.0 cm     LVIDd: 4.1 cm  LVIDs: 2.6 cm  LVPWd: 1.3 cm  FS: 36.3 %  LV mass(C)d: 164.9 grams  LV mass(C)dI: 85.8 grams/m2  Ao root diam: 3.6 cm  LA dimension: 3.3 cm  LA/Ao: 0.92  RWT: 0.65     Doppler Measurements & Calculations  TR max cleve: 286.7 cm/sec  TR max P.9 mmHg     ______________________________________________________________________________  Report approved by: Laila Pearl 2021 03:55 PM               Discharge Medications   Current Discharge Medication List      START taking these medications    Details   acetaminophen (TYLENOL) 325 MG tablet Take 2 tablets (650 mg) by mouth every 4 hours as needed for mild pain  Qty:      Associated Diagnoses: Pneumothorax on right      apixaban ANTICOAGULANT (ELIQUIS) 5 MG tablet Take 1 tablet (5 mg) by mouth 2 times daily  Qty:      Associated Diagnoses: Atrial fibrillation, unspecified type (H)      carboxymethylcellulose PF (REFRESH PLUS) 0.5 % ophthalmic solution Place 1 drop into both eyes every hour as needed for dry eyes  Qty:      Associated Diagnoses: Dry eyes      guaiFENesin (MUCINEX) 600 MG 12 hr tablet Take 1 tablet (600 mg) by mouth 2 times daily as needed for congestion    Associated Diagnoses: Cough      guaiFENesin-dextromethorphan (ROBITUSSIN DM) 100-10 MG/5ML syrup Take 5 mLs by mouth every 4 hours as needed for cough  Qty:      Associated Diagnoses: Cough      melatonin 1 MG TABS tablet Take 1 tablet (1 mg) by mouth nightly as needed for sleep  Qty:      Associated Diagnoses: Insomnia, unspecified type      metoprolol tartrate (LOPRESSOR) 25 MG tablet Take 1 tablet (25 mg) by  mouth 2 times daily  Qty:      Associated Diagnoses: Atrial fibrillation, unspecified type (H)         CONTINUE these medications which have NOT CHANGED    Details   albuterol (PROAIR HFA) 108 (90 Base) MCG/ACT inhaler Inhale 1-2 puffs into the lungs every 4 hours as needed for shortness of breath / dyspnea  Qty: 1 Inhaler, Refills: 11    Comments: Pharmacy may dispense brand covered by insurance (Proair, or proventil or ventolin or generic albuterol inhaler)  Associated Diagnoses: Panlobular emphysema (H)      albuterol (PROVENTIL) (2.5 MG/3ML) 0.083% neb solution Take 1 vial (2.5 mg) by nebulization every 2 hours as needed for shortness of breath / dyspnea  Qty: 2 Box, Refills: 11    Associated Diagnoses: Panlobular emphysema (H)      fluticasone-salmeterol (ADVAIR) 500-50 MCG/DOSE inhaler Inhale 1 puff into the lungs 2 times daily  Qty: 1 Inhaler, Refills: 11    Associated Diagnoses: Panlobular emphysema (H)      roflumilast (DALIRESP) 250 MCG TABS tablet Take 1 tablet (250 mcg) by mouth daily  Qty: 30 tablet, Refills: 0    Associated Diagnoses: Panlobular emphysema (H)      tiotropium (SPIRIVA HANDIHALER) 18 MCG inhaled capsule Inhale 1 capsule (18 mcg) into the lungs daily  Qty: 30 capsule, Refills: 11    Associated Diagnoses: Panlobular emphysema (H)         STOP taking these medications       ibuprofen (ADVIL/MOTRIN) 200 MG tablet Comments:   Reason for Stopping:             Allergies   No Known Allergies

## 2021-06-20 NOTE — PLAN OF CARE
A&O x4, VSS on 2.5L O2. Pt c/o of headache pain 5/10, prn tylenol was effective. Tele: SR/A-fib CVR. LS: fine crackles. R chest tube incision CDI. BS: audible. +void. +flatus, -BM. Up A-1.

## 2021-06-20 NOTE — PLAN OF CARE
Physical Therapy Discharge Summary    Reason for therapy discharge:    Discharged to transitional care facility.    Progress towards therapy goal(s). See goals on Care Plan in [facility] electronic health record for goal details.  Goals partially met.  Barriers to achieving goals:   discharge from facility.    Therapy recommendation(s):    Continued therapy is recommended.  Rationale/Recommendations:  Patient would benefit from continued skilled PT to progress patients independence and activity tolerance prior to discharging to home. .    PT- Pt discharged to TCU. PT goals partially met.

## 2021-06-20 NOTE — CONSULTS
[facility]    Cardiology Consultation     Date of Admission:  6/13/2021    Assessment & Plan   Flavia Luu is a 75 year old male who was admitted on 6/13/2021.    I was asked to see the patient for control of atrial fibrillation.  Shortly after visiting with the patient he went into normal sinus rhythm.  The patient received multiple rounds of IV metoprolol, oral metoprolol is currently at 25 mg twice daily and there was some plans for use of digoxin.  However the patient went into normal rhythm and this was canceled.    Patient is currently admitted with a COPD exacerbation as well as a spontaneous right pneumothorax and tension pneumothorax and has chronic COPD on oxygen therapy.      The patient is currently in normal sinus rhythm.  I suspect that he may have recurrence of atrial fibrillation pending the course of his respiratory processes.  As he works through the recovery process of his COPD exacerbation episodes of atrial fibrillation will be difficult to control.    The patient had some mild hypotension following the multiple courses of IV metoprolol.  I have stopped this as a PRN.    #1 atrial fibrillation, currently in normal sinus rhythm  #2 COPD exacerbation  #3 spontaneous right pneumothorax status post chest tube  #4 extensive coronary calcification seen on chest CT in 2018    I suspect the cause of this atrial fibrillation episode is related to the systemic stresses of a COPD exacerbation, tension pneumothorax.  Fortunately the patient has returned to normal sinus rhythm.    Recommend to continue metoprolol 25 mg to twice daily if tolerated.    I have discontinued the IV metoprolol as needed medication due to possible precipitation of hypotension.    The patient's chads vas score seems to be 3 due to age and vascular disease.  There is a chest CT scan from 2018 that notes extensive coronary vascular calcifications.  I do not see a documented heart failure history  hypertension history stroke.  The patient meets criteria for anticoagulation.  He would be a good candidate for either apixaban or Eliquis.  I would recommend checking with pharmacy to determine which is best covered for him and proceed with this for an anticoagulant if the patient is in agreement.    Manpreet Ayala MD     Code Status    No CPR- Do NOT Intubate    Reason for Consult   Reason for consult: MAREN cavanaugh    Primary Care Physician   Filemon Fletcher    Chief Complaint   COPD exacerbation    History is obtained from the patient    History of Present Illness   Flavia Luu is a 75 year old male who was admitted on 6/13/2021.    I was asked to see the patient for control of atrial fibrillation.  Shortly after visiting with the patient he went into normal sinus rhythm.  The patient received multiple rounds of IV metoprolol, oral metoprolol is currently at 25 mg twice daily and there was some plans for use of digoxin.  However the patient went into normal rhythm and this was canceled.    Patient is currently admitted with a COPD exacerbation as well as a spontaneous right pneumothorax and tension pneumothorax and has chronic COPD on oxygen therapy.      Patient has a history of paroxysmal atrial fibrillation for 2015.  He has coronary calcification seen on a chest CT in 2018.  He is not currently on any medications for atrial fibrillation.  He is not on any anticoagulation.    Patient has been recovering from COPD exacerbation but still has pulmonary congestion and wheezing.  He is currently in normal sinus rhythm.    Past Medical History   I have reviewed this patient's medical history and updated it with pertinent information if needed.   Past Medical History:   Diagnosis Date     Atrial flutter (H)     sp ablation     COPD (chronic obstructive pulmonary disease) (H)      Emphysema of lung (H)      PVC (premature ventricular contraction)     LVOT, moderately frequent     Tobacco use disorder         Past Surgical History   I have reviewed this patient's surgical history and updated it with pertinent information if needed.  Past Surgical History:   Procedure Laterality Date     CATARACT IOL, RT/LT  2012    bilat     EYE SURGERY       H ABLATION ATRIAL FLUTTER  1/17/2013          ZZC NONSPECIFIC PROCEDURE      vv stripping on left       Prior to Admission Medications   Prior to Admission Medications   Prescriptions Last Dose Informant Patient Reported? Taking?   albuterol (PROAIR HFA) 108 (90 Base) MCG/ACT inhaler 6/12/2021 at Unknown time Self No Yes   Sig: Inhale 1-2 puffs into the lungs every 4 hours as needed for shortness of breath / dyspnea   albuterol (PROVENTIL) (2.5 MG/3ML) 0.083% neb solution 6/12/2021 at Unknown time Self No Yes   Sig: Take 1 vial (2.5 mg) by nebulization every 2 hours as needed for shortness of breath / dyspnea   fluticasone-salmeterol (ADVAIR) 500-50 MCG/DOSE inhaler 6/12/2021 at Unknown time Self No Yes   Sig: Inhale 1 puff into the lungs 2 times daily   ibuprofen (ADVIL/MOTRIN) 200 MG tablet 6/12/2021 at Unknown time Self Yes Yes   Sig: Take 200 mg by mouth every 6 hours as needed for mild pain or moderate pain   roflumilast (DALIRESP) 250 MCG TABS tablet 6/12/2021 at Unknown time Self No Yes   Sig: Take 1 tablet (250 mcg) by mouth daily   tiotropium (SPIRIVA HANDIHALER) 18 MCG inhaled capsule 6/12/2021 at Unknown time Self No Yes   Sig: Inhale 1 capsule (18 mcg) into the lungs daily      Facility-Administered Medications: None     Allergies   No Known Allergies    Social History   I have reviewed this patient's social history and updated it with pertinent information if needed. Flavia Luu  reports that he quit smoking about 12 years ago. His smoking use included cigarettes. He has a 43.00 pack-year smoking history. He has never used smokeless tobacco. He reports current alcohol use. He reports that he does not use drugs.    Family History   I have reviewed this patient's  family history and updated it with pertinent information if needed.   Family History   Problem Relation Age of Onset     Cancer Mother      Diabetes Father        Review of Systems   The 12 point Review of Systems is negative other than noted in the HPI or here.     Physical Exam   Temp: 97.6  F (36.4  C) Temp src: Axillary BP: 122/59 Pulse: 73   Resp: 20 SpO2: 94 % O2 Device: Nasal cannula Oxygen Delivery: 3 LPM  Vital Signs with Ranges  Temp:  [97.5  F (36.4  C)-98.6  F (37  C)] 97.6  F (36.4  C)  Pulse:  [] 73  Resp:  [18-38] 20  BP: ()/(52-62) 122/59  SpO2:  [90 %-97 %] 94 %  157 lbs 0 oz    GENERAL APPEARANCE: Alert and oriented x3. No acute distress.  SKIN: Inspection of the skin reveals no rashes, ulcerations, warm, dry  NECK: Supple and symmetric.   Normal JVP  LUNGS: Coarse sounds throughout, some wheezing  CARDIOVASCULAR: S1, S2, regular rate and rhythm without any murmurs, gallops, rubs. The carotid pulses were normal and 2+ bilaterally without bruits. Peripheral pulses were 2+ and symmetric.  ABDOMEN: Soft, non-tender, non-distended with normal bowel sounds.  No ascites noted.  EXTREMITIES: No edema.  NEUROLOGIC:  Normal mood and affect.  Sensation to touch was normal.      Data   Results for orders placed or performed during the hospital encounter of 21 (from the past 24 hour(s))   Echocardiogram Complete    Narrative    [id number]  [id number]  [id number]  [id number]^CHELSEY^CATINA     [facility]  Echocardiography Laboratory  [address] [place] [id number]     Name: KAMALA BRIGGS  MRN: [id number]  : 1945  Study Date: 2021 01:43 PM  Age: 75 yrs  Gender: Male  Patient Location: [facility]  Reason For Study: Atrial Fibrillation  Ordering Physician: CATINA BARBOSA  Referring Physician: Filemon Fletcher  Performed By: Lolis Burdick     BSA: 1.9 m2  Height: 72 in  Weight: 157 lb  HR: 113  BP: 116/63  mmHg  ______________________________________________________________________________  Procedure  Complete Portable Echo Adult. Optison (NDC #[id number]) given intravenously.  ______________________________________________________________________________  Interpretation Summary     The visual ejection fraction is estimated at 55-60%.  There is moderate to mod-severe (2-3+) tricuspid regurgitation.  Right ventricular systolic pressure is elevated, consistent with mild to  moderate pulmonary hypertension.  The study was technically difficult. Contrast was used without apparent  complications.  ______________________________________________________________________________  Left Ventricle  The left ventricle is normal in size. There is normal left ventricular wall  thickness. The visual ejection fraction is estimated at 55-60%. Diastolic  function not assessed due to atrial fibrillation. Normal left ventricular wall  motion. No evidence of left ventricular mass or tumors.     Right Ventricle  The right ventricle is mildly dilated. Right ventricular function cannot be  assessed due to poor image quality.     Atria  Normal left atrial size. Right atrium not well visualized.     Mitral Valve  The mitral valve leaflets appear normal. There is no evidence of stenosis,  fluttering, or prolapse.     Tricuspid Valve  Tricuspid leaflets are thickened. There is moderate to mod-severe (2-3+)  tricuspid regurgitation. Right ventricular systolic pressure is elevated,  consistent with mild to moderate pulmonary hypertension.     Aortic Valve  There is mild trileaflet aortic sclerosis.     Pulmonic Valve  Normal pulmonic valve.     Vessels  The aortic root is normal size. Dilation of the inferior vena cava is present  with normal respiratory variation in diameter.     Pericardium  The pericardium appears normal.     Rhythm  The rhythm was rapid atrial  fibrillation.  ______________________________________________________________________________  MMode/2D Measurements & Calculations  IVSd: 1.0 cm     LVIDd: 4.1 cm  LVIDs: 2.6 cm  LVPWd: 1.3 cm  FS: 36.3 %  LV mass(C)d: 164.9 grams  LV mass(C)dI: 85.8 grams/m2  Ao root diam: 3.6 cm  LA dimension: 3.3 cm  LA/Ao: 0.92  RWT: 0.65     Doppler Measurements & Calculations  TR max cleve: 286.7 cm/sec  TR max P.9 mmHg     ______________________________________________________________________________  Report approved by: Laila Pearl 2021 03:55 PM         EKG 12-lead, tracing only   Result Value Ref Range    Interpretation ECG Click View Image link to view waveform and result

## 2021-06-20 NOTE — PROGRESS NOTES
Care Management Discharge Note    Discharge Date: 06/20/21(TCU)       Discharge Disposition: Transitional Care    Discharge Services: None    Discharge DME: None    Discharge Transportation: family or friend will provide, agency(Family or [facility])    Private pay costs discussed: Not applicable    PAS Confirmation Code:    Patient/family educated on Medicare website which has current facility and service quality ratings: yes    Education Provided on the Discharge Plan:    Persons Notified of Discharge Plans: Patient and spouse  Patient/Family in Agreement with the Plan: yes    Handoff Referral Completed: No    Additional Information:  Patient will discharge to [facility] at 1600 via family. Orders to be faxed to weekend fax [phone] once complete.     PAS-RR    D: Per DHS regulation, SW completed and submitted PAS-RR to [place] [employer] Direct Connect via the Senior LinkAge Line.  PAS-RR confirmation # is : [id number]    I: SW spoke with pt and they are aware a PAS-RR has been submitted.  SW reviewed with pt that they may be contacted for a follow up appointment within 10 days of hospital discharge if their SNF stay is < 30 days.  Contact information for [facility] LinkAge Line was also provided.    A: pt verbalized understanding.    P: Further questions may be directed to [facility] LinkAge Line at #[phone], option #4 for PAS-RR staff.    ADDENDUM 1300  Writer placed call to [facility] for transport tank at discharge. Confirmed discharge time with DME dispatch, faxed facesheet & orders to [phone].     Writer faxed discharge orders to [facility] at weekend fax.       ELIZ Martínez

## 2021-06-20 NOTE — PROGRESS NOTES
Care Management Follow Up    Length of Stay (days): 7    Expected Discharge Date: 06/20/21(TCU)     Concerns to be Addressed: discharge planning  Pt is receptive and agreeable, but hoping for a short stay. Pt focused on improving his mobility  Patient plan of care discussed at interdisciplinary rounds: Yes    Anticipated Discharge Disposition: Transitional Care  Disposition Comments: Plan for pt to attend TCU at discharge  Anticipated Discharge Services: None  Anticipated Discharge DME: None    Patient/family educated on Medicare website which has current facility and service quality ratings: yes  Education Provided on the Discharge Plan:    Patient/Family in Agreement with the Plan: yes    Referrals Placed by CM/SW: Senior Linkage Line, Post Acute Facilities, Transportation, Communication hand-offs to next level of Care Providers  Private pay costs discussed: Not applicable    Additional Information:  Patient medically stable and ready for discharge today per IDP rounds this AM. Writer placed call to [email or web address] who had accepted patient for TCU to confirm bed for today. Writer left voicemail with call back number.     ADDENDUM 945  Received call back from [facility] to confirm bed for today. Will need orders faxed to weekend admissions [phone] once complete. Writer confirmed with spouse, cassie discharge plan. Cassie will transport at 1600.     ELIZ Martínez

## 2021-06-21 ENCOUNTER — TELEPHONE (OUTPATIENT)
Dept: INTERNAL MEDICINE | Facility: CLINIC | Age: 76
End: 2021-06-21

## 2021-06-21 ENCOUNTER — NURSING HOME VISIT (OUTPATIENT)
Dept: GERIATRICS | Facility: CLINIC | Age: 76
End: 2021-06-21
Payer: COMMERCIAL

## 2021-06-21 VITALS
RESPIRATION RATE: 18 BRPM | SYSTOLIC BLOOD PRESSURE: 76 MMHG | DIASTOLIC BLOOD PRESSURE: 45 MMHG | TEMPERATURE: 97.2 F | HEART RATE: 74 BPM | OXYGEN SATURATION: 94 %

## 2021-06-21 DIAGNOSIS — J44.1 COPD EXACERBATION (H): Primary | ICD-10-CM

## 2021-06-21 DIAGNOSIS — F41.9 ANXIETY: ICD-10-CM

## 2021-06-21 DIAGNOSIS — J93.9 PNEUMOTHORAX ON RIGHT: ICD-10-CM

## 2021-06-21 DIAGNOSIS — I48.91 ATRIAL FIBRILLATION WITH RVR (H): ICD-10-CM

## 2021-06-21 DIAGNOSIS — I48.91 ATRIAL FIBRILLATION, UNSPECIFIED TYPE (H): ICD-10-CM

## 2021-06-21 DIAGNOSIS — Z87.891 FORMER SMOKER: ICD-10-CM

## 2021-06-21 DIAGNOSIS — R91.8 PULMONARY NODULES: ICD-10-CM

## 2021-06-21 DIAGNOSIS — J43.1 PANLOBULAR EMPHYSEMA (H): ICD-10-CM

## 2021-06-21 PROCEDURE — 99310 SBSQ NF CARE HIGH MDM 45: CPT | Performed by: NURSE PRACTITIONER

## 2021-06-21 RX ORDER — METOPROLOL TARTRATE 25 MG/1
12.5 TABLET, FILM COATED ORAL 2 TIMES DAILY
Start: 2021-06-21 | End: 2021-07-06

## 2021-06-21 NOTE — PROGRESS NOTES
[facility]  PRIMARY CARE PROVIDER AND CLINIC:  Filemon Fletcher MD, [address] / [facility] [address]  Chief Complaint   Patient presents with     Hospital F/U     [facility] Record Number:  [id number]  Place of Service where encounter took place:  [facility] (TCU) [25]    Flavia Luu  is a 75 year old  (1945), admitted to the above facility from  [facility]. Hospital stay 6/13/21 through 6/20/21..  Admitted to this facility for  rehab, medical management and nursing care.    HPI:    HPI information obtained from: facility chart records, facility staff, patient report and [facility] chart review.   Brief Summary of Hospital Course:   PMH of COPD, atrial fib who presents with increased SOB.   Acute on chronic hypercapnic respiratory failure due to acute COPD exacerbation and spontaneous pneumothroax: spontaneous pneumothorax with progression to tension pneumothorax on 6/14 s/p chest tube placement: SIRS likely COPD and pneumothorax. Initially pneumothorax small enough no intervention initiated but on 6/14 patient had worsening SOB, tachycardia and CT placed. After CT placed patient improved, stable 72 hours after CT removal prior to discharge. COPD exacerbation Tx with soluMedrol and prednisone coarse complete.   Atrial fib with RVR: Hx of atrial flutter s/p ablation: patient is not on medication PTA, 6/19 patient having extreme fa. tigue and dizziness found to have Afib with RVR, metoprolol 25mg BID with holding parameters, cardiology evaluated and recommended AC, due to Chads score of 3, started on eliquis   Echo with mild to moderate pulmonary HTN, normal EF, moderate to severe tricuspid regurgitation  Acute metabolic encephalopathy: due to hypercapnia and hypoxemia: improved with chest tube placement and Tx of pneumothorax.   New 7mm right upper lobe nodule: f/u with PCP as OP, needs repeat imaging 6-12 months.   Updates on Status  "Since Skilled nursing Admission: on exam today patient sitting up in w/c, he is noticeably SOB, states his breathing is always \"bad\" he states he has to think about breathing all of the time. Denies increased SOB, cough, congestion, states he has his \"usual cough\" this AM he is feeling fatigued and his BP is quite low 70's systolic after administration of metoprolol, nursing states patient has a lot of anxiety and she feels that is contributing to SOB. Patient states he did not sleep well last night due to the noise from oxygen concentrator, denies CP, palpitations, fever, chills, N/V/D or constipation.     CODE STATUS/ADVANCE DIRECTIVES DISCUSSION:   o CPR- Do NOT Intubate  Patient's living condition: lives with spouse  ALLERGIES: Patient has no known allergies.  PAST MEDICAL HISTORY:  has a past medical history of Atrial flutter (H), COPD (chronic obstructive pulmonary disease) (H), Emphysema of lung (H), PVC (premature ventricular contraction), and Tobacco use disorder.  PAST SURGICAL HISTORY:   has a past surgical history that includes NONSPECIFIC PROCEDURE; Eye surgery; EP Ablation atrial flutter (1/17/2013); and cataract iol, rt/lt (2012).  FAMILY HISTORY: family history includes Cancer in his mother; Diabetes in his father.  SOCIAL HISTORY:   reports that he quit smoking about 12 years ago. His smoking use included cigarettes. He has a 43.00 pack-year smoking history. He has never used smokeless tobacco. He reports current alcohol use. He reports that he does not use drugs.    Post Discharge Medication Reconciliation Status: discharge medications reconciled, continue medications without change    Current Outpatient Medications   Medication Sig Dispense Refill     acetaminophen (TYLENOL) 325 MG tablet Take 2 tablets (650 mg) by mouth every 4 hours as needed for mild pain       albuterol (PROAIR HFA) 108 (90 Base) MCG/ACT inhaler Inhale 1-2 puffs into the lungs every 4 hours as needed for shortness of breath / " dyspnea 1 Inhaler 11     albuterol (PROVENTIL) (2.5 MG/3ML) 0.083% neb solution Take 1 vial (2.5 mg) by nebulization every 2 hours as needed for shortness of breath / dyspnea 2 Box 11     apixaban ANTICOAGULANT (ELIQUIS) 5 MG tablet Take 1 tablet (5 mg) by mouth 2 times daily       carboxymethylcellulose PF (REFRESH PLUS) 0.5 % ophthalmic solution Place 1 drop into both eyes every hour as needed for dry eyes       fluticasone-salmeterol (ADVAIR) 500-50 MCG/DOSE inhaler Inhale 1 puff into the lungs 2 times daily 1 Inhaler 11     guaiFENesin (MUCINEX) 600 MG 12 hr tablet Take 1 tablet (600 mg) by mouth 2 times daily as needed for congestion       guaiFENesin-dextromethorphan (ROBITUSSIN DM) 100-10 MG/5ML syrup Take 5 mLs by mouth every 4 hours as needed for cough       melatonin 1 MG TABS tablet Take 1 tablet (1 mg) by mouth nightly as needed for sleep       metoprolol tartrate (LOPRESSOR) 25 MG tablet Take 1 tablet (25 mg) by mouth 2 times daily       roflumilast (DALIRESP) 250 MCG TABS tablet Take 1 tablet (250 mcg) by mouth daily 30 tablet 0     tiotropium (SPIRIVA HANDIHALER) 18 MCG inhaled capsule Inhale 1 capsule (18 mcg) into the lungs daily 30 capsule 11         ROS:  10 point ROS of systems including Constitutional, Eyes, Respiratory, Cardiovascular, Gastroenterology, Genitourinary, Integumentary, Musculoskeletal, Psychiatric were all negative except for pertinent positives noted in my HPI.    Vitals:  BP (!) 76/45   Pulse 74   Temp 97.2  F (36.2  C)   Resp 18   SpO2 94%   Exam:  GENERAL APPEARANCE:  Alert, in mild distress due to SOB  ENT:  Mouth and posterior oropharynx normal, moist mucous membranes, [place]  EYES:  EOM, conjunctivae, lids, pupils and irises normal, PERRL  RESP:  respiratory effort and palpation of chest normal, diminished breath sounds throughout fields, crackles bases bilaterally  CV:  Palpation and auscultation of heart done , regular rate and rhythm, no murmur, rub, or gallop,  peripheral edema 1+ in LE bilaterally  ABDOMEN:  normal bowel sounds, soft, nontender, no hepatosplenomegaly or other masses  M/S:   patient sitting up in w/c, able to move all 4 extremities  SKIN:  Inspection of skin and subcutaneous tissue baseline  NEURO:   speech WNL  PSYCH:  affect and mood normal    Lab/Diagnostic data:    Most Recent 3 CBC's:  Recent Labs   Lab Test 06/20/21  1216 06/16/21  0644 06/14/21  0640 06/13/21  0322   WBC  --  9.2 8.3 7.9   HGB  --  12.3* 11.8* 13.7   MCV  --  99 101* 101*    238 266 315     Most Recent 3 BMP's:  Recent Labs   Lab Test 06/19/21  0731 06/16/21  0644 06/14/21  0640    135 135   POTASSIUM 4.1 3.9 5.0   CHLORIDE 100 99 102   CO2 37* 36* 35*   BUN 26 30 23   CR 0.68 0.75 0.68   ANIONGAP <1* <1* <1*   SAMIRA 8.6 8.5 9.2   * 95 129*       ASSESSMENT/PLAN:  COPD exacerbation (H)  Panlobular emphysema (H)  Former smoker  Pneumothorax on right  Acute/ongoing: on continuous O2 at 3 liters N/C, monitor SaO2 at rest and with activity, continue spiriva 18mcg QD, daliresp 250mg QD,  mucinex 600mg BID, albuterol nebs q 2 hours prn  Follow closely    Atrial fibrillation with RVR (H)  Ongoing: vitals daily and prn, started on metoprolol and apixaban during hospitalization  Decrease metoprolol to 12.5mg BID hold for SBP < 90, continue apixaban 5mg BID    Anxiety  Acute/ongoing: follow at this time, may consider starting vistaril 25mg q 6 hours prn    Pulmonary nodules  Acute/new 7mm nodule found Right lung, f/u with PCP as OP     Order:   BMP and Hgb twice weekly  Increase melatonin to 5mg qhs prn  Decrease metoprolol to 12.5mg BID, hold for SBP < 90mmhg    Total time spent with patient visit at the [facility] nursing facility was 35 min including patient visit and review of past records. Greater than 50% of total time spent with counseling and coordinating care due to discussed low BP, will decrease metoprolol, discussed SOB, patient states breathing is at baseline,  states he is feeling fatigued due to low BP, will continue to monitor and follow labs twice weekly. .     Electronically signed by:  Tonya Lynn Haase, APRN CNP

## 2021-06-21 NOTE — LETTER
6/21/2021        RE: Flavia Luu  [address]  [facility][1] [id number]        [facility]  PRIMARY CARE PROVIDER AND CLINIC:  Filemon Fletcher MD, [address] / [facility] [address]  Chief Complaint   Patient presents with     Hospital F/U     [place] Medical Record Number:  [id number]  Place of Service where encounter took place:  [facility] (TCU) [25]    Flavia Luu  is a 75 year old  (1945), admitted to the above facility from  [facility]. Hospital stay 6/13/21 through 6/20/21..  Admitted to this facility for  rehab, medical management and nursing care.    HPI:    HPI information obtained from: facility chart records, facility staff, patient report and [facility] chart review.   Brief Summary of Hospital Course:   PMH of COPD, atrial fib who presents with increased SOB.   Acute on chronic hypercapnic respiratory failure due to acute COPD exacerbation and spontaneous pneumothroax: spontaneous pneumothorax with progression to tension pneumothorax on 6/14 s/p chest tube placement: SIRS likely COPD and pneumothorax. Initially pneumothorax small enough no intervention initiated but on 6/14 patient had worsening SOB, tachycardia and CT placed. After CT placed patient improved, stable 72 hours after CT removal prior to discharge. COPD exacerbation Tx with soluMedrol and prednisone coarse complete.   Atrial fib with RVR: Hx of atrial flutter s/p ablation: patient is not on medication PTA, 6/19 patient having extreme fa. tigue and dizziness found to have Afib with RVR, metoprolol 25mg BID with holding parameters, cardiology evaluated and recommended AC, due to Chads score of 3, started on eliquis   Echo with mild to moderate pulmonary HTN, normal EF, moderate to severe tricuspid regurgitation  Acute metabolic encephalopathy: due to hypercapnia and hypoxemia: improved with chest tube placement and Tx of pneumothorax.   New 7mm right  "upper lobe nodule: f/u with PCP as OP, needs repeat imaging 6-12 months.   Updates on Status Since Skilled nursing Admission: on exam today patient sitting up in w/c, he is noticeably SOB, states his breathing is always \"bad\" he states he has to think about breathing all of the time. Denies increased SOB, cough, congestion, states he has his \"usual cough\" this AM he is feeling fatigued and his BP is quite low 70's systolic after administration of metoprolol, nursing states patient has a lot of anxiety and she feels that is contributing to SOB. Patient states he did not sleep well last night due to the noise from oxygen concentrator, denies CP, palpitations, fever, chills, N/V/D or constipation.     CODE STATUS/ADVANCE DIRECTIVES DISCUSSION:   o CPR- Do NOT Intubate  Patient's living condition: lives with spouse  ALLERGIES: Patient has no known allergies.  PAST MEDICAL HISTORY:  has a past medical history of Atrial flutter (H), COPD (chronic obstructive pulmonary disease) (H), Emphysema of lung (H), PVC (premature ventricular contraction), and Tobacco use disorder.  PAST SURGICAL HISTORY:   has a past surgical history that includes NONSPECIFIC PROCEDURE; Eye surgery; EP Ablation atrial flutter (1/17/2013); and cataract iol, rt/lt (2012).  FAMILY HISTORY: family history includes Cancer in his mother; Diabetes in his father.  SOCIAL HISTORY:   reports that he quit smoking about 12 years ago. His smoking use included cigarettes. He has a 43.00 pack-year smoking history. He has never used smokeless tobacco. He reports current alcohol use. He reports that he does not use drugs.    Post Discharge Medication Reconciliation Status: discharge medications reconciled, continue medications without change    Current Outpatient Medications   Medication Sig Dispense Refill     acetaminophen (TYLENOL) 325 MG tablet Take 2 tablets (650 mg) by mouth every 4 hours as needed for mild pain       albuterol (PROAIR HFA) 108 (90 Base) " MCG/ACT inhaler Inhale 1-2 puffs into the lungs every 4 hours as needed for shortness of breath / dyspnea 1 Inhaler 11     albuterol (PROVENTIL) (2.5 MG/3ML) 0.083% neb solution Take 1 vial (2.5 mg) by nebulization every 2 hours as needed for shortness of breath / dyspnea 2 Box 11     apixaban ANTICOAGULANT (ELIQUIS) 5 MG tablet Take 1 tablet (5 mg) by mouth 2 times daily       carboxymethylcellulose PF (REFRESH PLUS) 0.5 % ophthalmic solution Place 1 drop into both eyes every hour as needed for dry eyes       fluticasone-salmeterol (ADVAIR) 500-50 MCG/DOSE inhaler Inhale 1 puff into the lungs 2 times daily 1 Inhaler 11     guaiFENesin (MUCINEX) 600 MG 12 hr tablet Take 1 tablet (600 mg) by mouth 2 times daily as needed for congestion       guaiFENesin-dextromethorphan (ROBITUSSIN DM) 100-10 MG/5ML syrup Take 5 mLs by mouth every 4 hours as needed for cough       melatonin 1 MG TABS tablet Take 1 tablet (1 mg) by mouth nightly as needed for sleep       metoprolol tartrate (LOPRESSOR) 25 MG tablet Take 1 tablet (25 mg) by mouth 2 times daily       roflumilast (DALIRESP) 250 MCG TABS tablet Take 1 tablet (250 mcg) by mouth daily 30 tablet 0     tiotropium (SPIRIVA HANDIHALER) 18 MCG inhaled capsule Inhale 1 capsule (18 mcg) into the lungs daily 30 capsule 11         ROS:  10 point ROS of systems including Constitutional, Eyes, Respiratory, Cardiovascular, Gastroenterology, Genitourinary, Integumentary, Musculoskeletal, Psychiatric were all negative except for pertinent positives noted in my HPI.    Vitals:  BP (!) 76/45   Pulse 74   Temp 97.2  F (36.2  C)   Resp 18   SpO2 94%   Exam:  GENERAL APPEARANCE:  Alert, in mild distress due to SOB  ENT:  Mouth and posterior oropharynx normal, moist mucous membranes, [place]  EYES:  EOM, conjunctivae, lids, pupils and irises normal, PERRL  RESP:  respiratory effort and palpation of chest normal, diminished breath sounds throughout fields, crackles bases bilaterally  CV:   Palpation and auscultation of heart done , regular rate and rhythm, no murmur, rub, or gallop, peripheral edema 1+ in LE bilaterally  ABDOMEN:  normal bowel sounds, soft, nontender, no hepatosplenomegaly or other masses  M/S:   patient sitting up in w/c, able to move all 4 extremities  SKIN:  Inspection of skin and subcutaneous tissue baseline  NEURO:   speech WNL  PSYCH:  affect and mood normal    Lab/Diagnostic data:    Most Recent 3 CBC's:  Recent Labs   Lab Test 06/20/21  1216 06/16/21  0644 06/14/21  0640 06/13/21  0322   WBC  --  9.2 8.3 7.9   HGB  --  12.3* 11.8* 13.7   MCV  --  99 101* 101*    238 266 315     Most Recent 3 BMP's:  Recent Labs   Lab Test 06/19/21  0731 06/16/21  0644 06/14/21  0640    135 135   POTASSIUM 4.1 3.9 5.0   CHLORIDE 100 99 102   CO2 37* 36* 35*   BUN 26 30 23   CR 0.68 0.75 0.68   ANIONGAP <1* <1* <1*   SAMIRA 8.6 8.5 9.2   * 95 129*       ASSESSMENT/PLAN:  COPD exacerbation (H)  Panlobular emphysema (H)  Former smoker  Pneumothorax on right  Acute/ongoing: on continuous O2 at 3 liters N/C, monitor SaO2 at rest and with activity, continue spiriva 18mcg QD, daliresp 250mg QD,  mucinex 600mg BID, albuterol nebs q 2 hours prn  Follow closely    Atrial fibrillation with RVR (H)  Ongoing: vitals daily and prn, started on metoprolol and apixaban during hospitalization  Decrease metoprolol to 12.5mg BID hold for SBP < 90, continue apixaban 5mg BID    Anxiety  Acute/ongoing: follow at this time, may consider starting vistaril 25mg q 6 hours prn    Pulmonary nodules  Acute/new 7mm nodule found Right lung, f/u with PCP as OP     Order:   BMP and Hgb twice weekly  Increase melatonin to 5mg qhs prn  Decrease metoprolol to 12.5mg BID, hold for SBP < 90mmhg    Total time spent with patient visit at the [facility] nursing [facility] was 35 min including patient visit and review of past records. Greater than 50% of total time spent with counseling and coordinating care due to discussed  low BP, will decrease metoprolol, discussed SOB, patient states breathing is at baseline, states he is feeling fatigued due to low BP, will continue to monitor and follow labs twice weekly. .     Electronically signed by:  Tonya Lynn Haase, APRN CNP                         Sincerely,        Tonya Lynn Haase, APRN CNP

## 2021-06-24 ENCOUNTER — TRANSFERRED RECORDS (OUTPATIENT)
Dept: HEALTH INFORMATION MANAGEMENT | Facility: CLINIC | Age: 76
End: 2021-06-24

## 2021-06-24 LAB
ANION GAP SERPL CALCULATED.3IONS-SCNC: 8 MMOL/L (ref 7–16)
BUN SERPL-MCNC: 13 MG/DL (ref 7–26)
CALCIUM SERPL-MCNC: 9 MG/DL (ref 8.4–10.4)
CHLORIDE SERPLBLD-SCNC: 97 MMOL/L (ref 98–109)
CO2 SERPL-SCNC: 35 MMOL/L (ref 20–29)
CREAT SERPL-MCNC: 0.58 MG/DL (ref 0.73–1.18)
GFR SERPL CREATININE-BSD FRML MDRD: >60 ML/MIN/1.73M2
GLUCOSE SERPL-MCNC: 98 MG/DL (ref 70–100)
INTERPRETATION ECG - MUSE: NORMAL
INTERPRETATION ECG - MUSE: NORMAL
POTASSIUM SERPL-SCNC: 4.4 MMOL/L (ref 3.5–5.1)
SODIUM SERPL-SCNC: 140 MMOL/L (ref 136–145)

## 2021-06-28 ENCOUNTER — MEDICAL CORRESPONDENCE (OUTPATIENT)
Dept: HEALTH INFORMATION MANAGEMENT | Facility: CLINIC | Age: 76
End: 2021-06-28

## 2021-06-28 NOTE — PROGRESS NOTES
"[facility]  [place] Medical Record Number:  [id number]  Place of Service where encounter took place:  [facility] (U) [25]  Chief Complaint   Patient presents with     Nursing Home Acute       HPI:    Flavia Luu  is a 75 year old (1945), who is being seen today for an episodic care visit.  HPI information obtained from: facility chart records, facility staff, patient report and [facility] chart review. Today's concern is:  COPD exacerbation/emphysema/lung nodules: patient continues on O2 SaO2 93-95% on 3 liters per NC which is his baseline, denies any increase SOB, cough, congestion from his baseline  Atrial fib: /72, 111/69, 95/55 with HR 80-90 range, denies CP, palpitations,   Anxiety: controlled  Deconditioning: patient is walking up to 120 feet with SBA, some HENRIQUEZ no increase from baseline  Weight gain: weight on admit was 154.4lbs and current weight is 160.7lbs, patient states he eats \"a lot more\" here in TCU than he does at home, does have some LE edema patient states he has always had LE edema  Past Medical and Surgical History reviewed in Epic today.      MEDICATIONS:    Current Outpatient Medications   Medication Sig Dispense Refill     acetaminophen (TYLENOL) 325 MG tablet Take 2 tablets (650 mg) by mouth every 4 hours as needed for mild pain       albuterol (PROAIR HFA) 108 (90 Base) MCG/ACT inhaler Inhale 1-2 puffs into the lungs every 4 hours as needed for shortness of breath / dyspnea 1 Inhaler 11     albuterol (PROVENTIL) (2.5 MG/3ML) 0.083% neb solution Take 1 vial (2.5 mg) by nebulization every 2 hours as needed for shortness of breath / dyspnea 2 Box 11     apixaban ANTICOAGULANT (ELIQUIS) 5 MG tablet Take 1 tablet (5 mg) by mouth 2 times daily       carboxymethylcellulose PF (REFRESH PLUS) 0.5 % ophthalmic solution Place 1 drop into both eyes every hour as needed for dry eyes       fluticasone-salmeterol (ADVAIR) 500-50 MCG/DOSE inhaler Inhale 1 puff " into the lungs 2 times daily 1 Inhaler 11     guaiFENesin (MUCINEX) 600 MG 12 hr tablet Take 1 tablet (600 mg) by mouth 2 times daily as needed for congestion       guaiFENesin-dextromethorphan (ROBITUSSIN DM) 100-10 MG/5ML syrup Take 5 mLs by mouth every 4 hours as needed for cough       melatonin 1 MG TABS tablet Take 1 tablet (1 mg) by mouth nightly as needed for sleep       metoprolol tartrate (LOPRESSOR) 25 MG tablet Take 0.5 tablets (12.5 mg) by mouth 2 times daily       roflumilast (DALIRESP) 250 MCG TABS tablet Take 1 tablet (250 mcg) by mouth daily 30 tablet 0     tiotropium (SPIRIVA HANDIHALER) 18 MCG inhaled capsule Inhale 1 capsule (18 mcg) into the lungs daily 30 capsule 11         REVIEW OF SYSTEMS:  10 point ROS of systems including Constitutional, Eyes, Respiratory, Cardiovascular, Gastroenterology, Genitourinary, Integumentary, Musculoskeletal, Psychiatric were all negative except for pertinent positives noted in my HPI.    Objective:  /72   Pulse 73   Temp 97.8  F (36.6  C)   Resp 20   SpO2 93%   Exam:  GENERAL APPEARANCE:  Alert, in no distress  ENT:  Mouth and posterior oropharynx normal, moist mucous membranes, normal hearing acuity  EYES:  EOM, conjunctivae, lids, pupils and irises normal, PERRL  RESP:  respiratory effort and palpation of chest normal, lungs clear to auscultation , no respiratory distress, diminished breath sounds bases bilaterally  CV:  Palpation and auscultation of heart done , regular rate and rhythm, no murmur, rub, or gallop, peripheral edema 1+ in LE bilaterally  ABDOMEN:  normal bowel sounds, soft, nontender, no hepatosplenomegaly or other masses  M/S:   patient sitting up in w/c able to move all 4 extremities  SKIN:  Inspection of skin and subcutaneous tissue baseline  NEURO:   speeh WNL    Labs:     Most Recent 3 CBC's:  Recent Labs   Lab Test 06/20/21  1216 06/16/21  0644 06/14/21  0640 06/13/21  0322   WBC  --  9.2 8.3 7.9   HGB  --  12.3* 11.8* 13.7   MCV   --  99 101* 101*    238 266 315     Most Recent 3 BMP's:  Recent Labs   Lab Test 06/24/21 06/19/21  0731 06/16/21  0644    137 135   POTASSIUM 4.4 4.1 3.9   CHLORIDE 97* 100 99   CO2 35* 37* 36*   BUN 13 26 30   CR 0.58* 0.68 0.75   ANIONGAP 8 <1* <1*   SAMIRA 9.0 8.6 8.5   GLC 98 101* 95       ASSESSMENT/PLAN:  COPD exacerbation (H)  Panlobular emphysema (H)  Former smoker  Pneumothorax on right  Acute/ongoing: on continuous O2 at 3 liters N/C, monitor SaO2 at rest and with activity, continue spiriva 18mcg QD, daliresp 250mg QD,  mucinex 600mg BID, albuterol nebs q 2 hours prn       Atrial fibrillation with RVR (H)  Ongoing: vitals daily and prn, started on metoprolol and apixaban during hospitalization  Continue metoprolol to 12.5mg BID hold for SBP < 90, continue apixaban 5mg BID     Anxiety  Acute/ongoing: follow at this time, may consider starting vistaril 25mg q 6 hours prn     Pulmonary nodules  Acute/new 7mm nodule found Right lung, f/u with PCP as OP         Orders  No new orders    Total time spent with patient visit at the [facility] nursing facility was 35 min including patient visit and review of past records. Greater than 50% of total time spent with counseling and coordinating care due to discussed breathing and what baseline is for patient, patient states he is ready to go home, no concerns noted, discussed weight gain, patient states he eats much more at TCU than he ever has at home, education on reporting increased SOB or edema. .  Electronically signed by:  Tonya Lynn Haase, APRN CNP

## 2021-06-29 ENCOUNTER — NURSING HOME VISIT (OUTPATIENT)
Dept: GERIATRICS | Facility: CLINIC | Age: 76
End: 2021-06-29
Payer: COMMERCIAL

## 2021-06-29 VITALS
SYSTOLIC BLOOD PRESSURE: 131 MMHG | DIASTOLIC BLOOD PRESSURE: 72 MMHG | TEMPERATURE: 97.8 F | HEART RATE: 73 BPM | OXYGEN SATURATION: 93 % | RESPIRATION RATE: 20 BRPM

## 2021-06-29 DIAGNOSIS — J43.1 PANLOBULAR EMPHYSEMA (H): ICD-10-CM

## 2021-06-29 DIAGNOSIS — F41.9 ANXIETY: ICD-10-CM

## 2021-06-29 DIAGNOSIS — R91.8 PULMONARY NODULES: ICD-10-CM

## 2021-06-29 DIAGNOSIS — I48.91 ATRIAL FIBRILLATION WITH RVR (H): ICD-10-CM

## 2021-06-29 DIAGNOSIS — Z87.891 FORMER SMOKER: ICD-10-CM

## 2021-06-29 DIAGNOSIS — J93.9 PNEUMOTHORAX ON RIGHT: ICD-10-CM

## 2021-06-29 DIAGNOSIS — J44.1 COPD EXACERBATION (H): Primary | ICD-10-CM

## 2021-06-29 PROCEDURE — 99310 SBSQ NF CARE HIGH MDM 45: CPT | Performed by: NURSE PRACTITIONER

## 2021-06-29 NOTE — LETTER
"    6/29/2021        RE: Flavia Luu  [address]  [facility] [address]        [facility]  [place] Medical Record Number:  [id number]  Place of Service where encounter took place:  [facility] ([facility]) [25]  Chief Complaint   Patient presents with     Nursing Home Acute       HPI:    Flavia Luu  is a 75 year old (1945), who is being seen today for an episodic care visit.  HPI information obtained from: facility chart records, facility staff, patient report and [facility] chart review. Today's concern is:  COPD exacerbation/emphysema/lung nodules: patient continues on O2 SaO2 93-95% on 3 liters per NC which is his baseline, denies any increase SOB, cough, congestion from his baseline  Atrial fib: /72, 111/69, 95/55 with HR 80-90 range, denies CP, palpitations,   Anxiety: controlled  Deconditioning: patient is walking up to 120 feet with SBA, some HENRIQUEZ no increase from baseline  Weight gain: weight on admit was 154.4lbs and current weight is 160.7lbs, patient states he eats \"a lot more\" here in TCU than he does at home, does have some LE edema patient states he has always had LE edema  Past Medical and Surgical History reviewed in Epic today.      MEDICATIONS:    Current Outpatient Medications   Medication Sig Dispense Refill     acetaminophen (TYLENOL) 325 MG tablet Take 2 tablets (650 mg) by mouth every 4 hours as needed for mild pain       albuterol (PROAIR HFA) 108 (90 Base) MCG/ACT inhaler Inhale 1-2 puffs into the lungs every 4 hours as needed for shortness of breath / dyspnea 1 Inhaler 11     albuterol (PROVENTIL) (2.5 MG/3ML) 0.083% neb solution Take 1 vial (2.5 mg) by nebulization every 2 hours as needed for shortness of breath / dyspnea 2 Box 11     apixaban ANTICOAGULANT (ELIQUIS) 5 MG tablet Take 1 tablet (5 mg) by mouth 2 times daily       carboxymethylcellulose PF (REFRESH PLUS) 0.5 % ophthalmic solution Place 1 drop into both eyes every hour as " needed for dry eyes       fluticasone-salmeterol (ADVAIR) 500-50 MCG/DOSE inhaler Inhale 1 puff into the lungs 2 times daily 1 Inhaler 11     guaiFENesin (MUCINEX) 600 MG 12 hr tablet Take 1 tablet (600 mg) by mouth 2 times daily as needed for congestion       guaiFENesin-dextromethorphan (ROBITUSSIN DM) 100-10 MG/5ML syrup Take 5 mLs by mouth every 4 hours as needed for cough       melatonin 1 MG TABS tablet Take 1 tablet (1 mg) by mouth nightly as needed for sleep       metoprolol tartrate (LOPRESSOR) 25 MG tablet Take 0.5 tablets (12.5 mg) by mouth 2 times daily       roflumilast (DALIRESP) 250 MCG TABS tablet Take 1 tablet (250 mcg) by mouth daily 30 tablet 0     tiotropium (SPIRIVA HANDIHALER) 18 MCG inhaled capsule Inhale 1 capsule (18 mcg) into the lungs daily 30 capsule 11         REVIEW OF SYSTEMS:  10 point ROS of systems including Constitutional, Eyes, Respiratory, Cardiovascular, Gastroenterology, Genitourinary, Integumentary, Musculoskeletal, Psychiatric were all negative except for pertinent positives noted in my HPI.    Objective:  /72   Pulse 73   Temp 97.8  F (36.6  C)   Resp 20   SpO2 93%   Exam:  GENERAL APPEARANCE:  Alert, in no distress  ENT:  Mouth and posterior oropharynx normal, moist mucous membranes, normal hearing acuity  EYES:  EOM, conjunctivae, lids, pupils and irises normal, PERRL  RESP:  respiratory effort and palpation of chest normal, lungs clear to auscultation , no respiratory distress, diminished breath sounds bases bilaterally  CV:  Palpation and auscultation of heart done , regular rate and rhythm, no murmur, rub, or gallop, peripheral edema 1+ in LE bilaterally  ABDOMEN:  normal bowel sounds, soft, nontender, no hepatosplenomegaly or other masses  M/S:   patient sitting up in w/c able to move all 4 extremities  SKIN:  Inspection of skin and subcutaneous tissue baseline  NEURO:   speeh WNL    Labs:     Most Recent 3 CBC's:  Recent Labs   Lab Test 06/20/21  1216  06/16/21  0644 06/14/21  0640 06/13/21  0322   WBC  --  9.2 8.3 7.9   HGB  --  12.3* 11.8* 13.7   MCV  --  99 101* 101*    238 266 315     Most Recent 3 BMP's:  Recent Labs   Lab Test 06/24/21 06/19/21  0731 06/16/21  0644    137 135   POTASSIUM 4.4 4.1 3.9   CHLORIDE 97* 100 99   CO2 35* 37* 36*   BUN 13 26 30   CR 0.58* 0.68 0.75   ANIONGAP 8 <1* <1*   SAMIRA 9.0 8.6 8.5   GLC 98 101* 95       ASSESSMENT/PLAN:  COPD exacerbation (H)  Panlobular emphysema (H)  Former smoker  Pneumothorax on right  Acute/ongoing: on continuous O2 at 3 liters N/C, monitor SaO2 at rest and with activity, continue spiriva 18mcg QD, daliresp 250mg QD,  mucinex 600mg BID, albuterol nebs q 2 hours prn       Atrial fibrillation with RVR (H)  Ongoing: vitals daily and prn, started on metoprolol and apixaban during hospitalization  Continue metoprolol to 12.5mg BID hold for SBP < 90, continue apixaban 5mg BID     Anxiety  Acute/ongoing: follow at this time, may consider starting vistaril 25mg q 6 hours prn     Pulmonary nodules  Acute/new 7mm nodule found Right lung, f/u with PCP as OP         Orders  No new orders    Total time spent with patient visit at the [facility] nursing [facility] was 35 min including patient visit and review of past records. Greater than 50% of total time spent with counseling and coordinating care due to discussed breathing and what baseline is for patient, patient states he is ready to go home, no concerns noted, discussed weight gain, patient states he eats much more at TCU than he ever has at home, education on reporting increased SOB or edema. .  Electronically signed by:  Tonya Lynn Haase, APRN CNP               Sincerely,        Tonya Lynn Haase, APRN CNP

## 2021-06-30 ENCOUNTER — TELEPHONE (OUTPATIENT)
Dept: INTERNAL MEDICINE | Facility: CLINIC | Age: 76
End: 2021-06-30

## 2021-06-30 NOTE — TELEPHONE ENCOUNTER
Wife calling states pt is in [facility] and is going to be discharged on Monday.  Has questions about medications and states some are expensive.  Wondering how to get Dr. Fletcher to be involved in the medications?     Nurse advised per hospital visit pt is to schedule with pcp within 7 days of discharge from TCU.  Scheduled a telephone visit with Dr. Fletcher for Tuesday 7/6 at 3:20 pm.    Natasha LLOYD RN  EP Triage

## 2021-07-01 ENCOUNTER — TELEPHONE (OUTPATIENT)
Dept: GERIATRICS | Facility: CLINIC | Age: 76
End: 2021-07-01

## 2021-07-01 NOTE — TELEPHONE ENCOUNTER
[facility] Nurse Triage Telephone Note    Provider: Tonya Haase, APRN CNP  Facility: [facility] Facility Type:  TCU    Caller: Gretchen/Elizabeth   Call Back Number: [phone]    Allergies:  No Known Allergies     Reason for call: Hgb 12.5,  BMP-   Na 141, K+ 4.6, BUN 11, Creat 0.64, GFR >60, CL 95, CO2 36  Pt is a COPD pt that is O2 dependent  O2 sats 93% on 3L today. No concerns from nursing, pt is to be discharging home on Monday and nursing is looking for discharge orders.       Verbal Order/Direction given by Provider: NNO with labs, Dr Lal to see the pt on 7/3/21, papers will be left in the 1st floor provider room (this was communicated to )     Provider giving Order:  Jl Marquez MD    Verbal Order given to: Joanna Mathis RN

## 2021-07-03 ENCOUNTER — NURSING HOME VISIT (OUTPATIENT)
Dept: GERIATRICS | Facility: CLINIC | Age: 76
End: 2021-07-03
Payer: COMMERCIAL

## 2021-07-03 DIAGNOSIS — J44.1 COPD EXACERBATION (H): Primary | ICD-10-CM

## 2021-07-03 DIAGNOSIS — R91.8 PULMONARY NODULES: ICD-10-CM

## 2021-07-03 DIAGNOSIS — I48.91 ATRIAL FIBRILLATION WITH RVR (H): ICD-10-CM

## 2021-07-03 DIAGNOSIS — J93.9 PNEUMOTHORAX ON RIGHT: ICD-10-CM

## 2021-07-03 PROCEDURE — 99305 1ST NF CARE MODERATE MDM 35: CPT | Mod: AI | Performed by: INTERNAL MEDICINE

## 2021-07-04 NOTE — PROGRESS NOTES
[facility]  PRIMARY CARE PROVIDER AND CLINIC:  Filemon Fletcher MD, [address] / [facility] [id number]    Pt was seen by Dr Lal at the [facility] on 7/3/21 for an initial TCU visit.    Pt  is a 75 year old  (1945), admitted to the above facility from  [facility]. Hospital stay 6/13/21 through 6/20/21..  Admitted to this facility for  rehab, medical management and nursing care.    Hospital course was reviewed by me, is as per the hospital discharge summary and NP note.    PMH of COPD, atrial fib who presents with increased SOB.   Was found to be in acute hypercapnic respiratory failure due to acute COPD exacerbation and spontaneous pneumothorax.  Pt developed a tension pneumothorax on 6/14, necessitating a chest tube placement.    Respiratory status stable after discontinuation of chest tube.  COPD exacerbation was treated with short course of steroids.    Course complicated by new onset of afib with RVR, treated with Metoprolol. Started on eliquis.  He remains on Spiriva, Daliresp, albuterol nebs    Echo with mild to moderate pulmonary HTN, normal EF, moderate to severe tricuspid regurgitation    Pt was found to have a new 7mm right upper lobe nodule: f/u with PCP as OP, needs repeat imaging 6-12 months.     Patient states his breathing is improved, at or close to baseline.  He is on oxygen chronically, is dyspnea with activity but states this is baseline for him.  He has an infrequent cough which is nonproductive.  Denies chest pain.  Appetite is fair.  He is quite anxious to discharge to home in 2 days.  He denies fevers, chills, difficulty swallowing, abdominal pain, diarrhea, dysuria.      Patient's living condition: lives with spouse  ALLERGIES: Patient has no known allergies.  PAST MEDICAL HISTORY:  has a past medical history of Atrial flutter (H), COPD (chronic obstructive pulmonary disease) (H), Emphysema of lung (H), PVC (premature  ventricular contraction), and Tobacco use disorder.  PAST SURGICAL HISTORY:   has a past surgical history that includes NONSPECIFIC PROCEDURE; Eye surgery; EP Ablation atrial flutter (1/17/2013); and cataract iol, rt/lt (2012).  FAMILY HISTORY: family history includes Cancer in his mother; Diabetes in his father.  SOCIAL HISTORY:   reports that he quit smoking about 12 years ago. His smoking use included cigarettes. He has a 43.00 pack-year smoking history. He has never used smokeless tobacco. He reports current alcohol use. He reports that he does not use drugs.        Current Outpatient Medications   Medication Sig Dispense Refill     acetaminophen (TYLENOL) 325 MG tablet Take 2 tablets (650 mg) by mouth every 4 hours as needed for mild pain       albuterol (PROAIR HFA) 108 (90 Base) MCG/ACT inhaler Inhale 1-2 puffs into the lungs every 4 hours as needed for shortness of breath / dyspnea 1 Inhaler 11     albuterol (PROVENTIL) (2.5 MG/3ML) 0.083% neb solution Take 1 vial (2.5 mg) by nebulization every 2 hours as needed for shortness of breath / dyspnea 2 Box 11     apixaban ANTICOAGULANT (ELIQUIS) 5 MG tablet Take 1 tablet (5 mg) by mouth 2 times daily       carboxymethylcellulose PF (REFRESH PLUS) 0.5 % ophthalmic solution Place 1 drop into both eyes every hour as needed for dry eyes       fluticasone-salmeterol (ADVAIR) 500-50 MCG/DOSE inhaler Inhale 1 puff into the lungs 2 times daily 1 Inhaler 11     guaiFENesin (MUCINEX) 600 MG 12 hr tablet Take 1 tablet (600 mg) by mouth 2 times daily as needed for congestion       guaiFENesin-dextromethorphan (ROBITUSSIN DM) 100-10 MG/5ML syrup Take 5 mLs by mouth every 4 hours as needed for cough       melatonin 1 MG TABS tablet Take 1 tablet (1 mg) by mouth nightly as needed for sleep       metoprolol tartrate (LOPRESSOR) 25 MG tablet Take 0.5 tablets (12.5 mg) by mouth 2 times daily       roflumilast (DALIRESP) 250 MCG TABS tablet Take 1 tablet (250 mcg) by mouth daily 30  tablet 0     tiotropium (SPIRIVA HANDIHALER) 18 MCG inhaled capsule Inhale 1 capsule (18 mcg) into the lungs daily 30 capsule 11         ROS:  10 point ROS of systems including Constitutional, Eyes, Respiratory, Cardiovascular, Gastroenterology, Genitourinary, Integumentary, Musculoskeletal, Psychiatric were all negative except for pertinent positives noted in HPI.    Exam:  GENERAL APPEARANCE:  Alert, thin appearing male, sitting up in bed.  He is wearing oxygen at 3 L/min per nasal cannula. he is very pleasant, mildly anxious.  He appears mildly tachypneic with conversation, but denies feeling short of breath.  ENT: Oral mucosa moist  EYES: No eye redness or drainage  RESP: Respiratory rate 20.  Markedly decreased breath sounds bilaterally  CV: Regular rate and rhythm.    ABDOMEN: Soft   M/S: Venous stasis changes bilaterally.  1+ lower extremity edema bilaterally.  SKIN: Venous stasis changes, as noted above.  NEURO: Alert, fully oriented, mildly anxious but very pleasant.  Face symmetric.  No focal weakness.  Slight tremors of both hands      Lab/Diagnostic data:    Most Recent 3 CBC's:  Recent Labs   Lab Test 06/20/21  1216 06/16/21  0644 06/14/21  0640 06/13/21  0322   WBC  --  9.2 8.3 7.9   HGB  --  12.3* 11.8* 13.7   MCV  --  99 101* 101*    238 266 315     Most Recent 3 BMP's:  Recent Labs   Lab Test 06/24/21 06/19/21  0731 06/16/21  0644    137 135   POTASSIUM 4.4 4.1 3.9   CHLORIDE 97* 100 99   CO2 35* 37* 36*   BUN 13 26 30   CR 0.58* 0.68 0.75   ANIONGAP 8 <1* <1*   SAMIRA 9.0 8.6 8.5   GLC 98 101* 95       ASSESSMENT/PLAN:    COPD exacerbation (H)  Panlobular emphysema (H)  Former smoker  Pneumothorax on right  Resp status improved, at or close to baseline.  Plan: Continue current treatments.  Therapies.  Anticipate discharge to home in 2 days      Atrial fibrillation with RVR (H)  New diagnosis during hospitalization.  Heart rate has been controlled.  Current rhythm is regular.  Plan:  Continue metoprolol (dose recently decreased secondary to low blood pressure in TCU)  Apixaban.    Pulmonary nodule  Acute/new 7mm nodule found Right lung, f/u with PCP as OP       Elijah Lal MD

## 2021-07-06 ENCOUNTER — VIRTUAL VISIT (OUTPATIENT)
Dept: INTERNAL MEDICINE | Facility: CLINIC | Age: 76
End: 2021-07-06
Payer: COMMERCIAL

## 2021-07-06 DIAGNOSIS — I48.91 ATRIAL FIBRILLATION, UNSPECIFIED TYPE (H): ICD-10-CM

## 2021-07-06 DIAGNOSIS — I48.19 PERSISTENT ATRIAL FIBRILLATION (H): ICD-10-CM

## 2021-07-06 DIAGNOSIS — J93.9 PNEUMOTHORAX ON RIGHT: Primary | ICD-10-CM

## 2021-07-06 DIAGNOSIS — J43.1 PANLOBULAR EMPHYSEMA (H): ICD-10-CM

## 2021-07-06 DIAGNOSIS — R91.1 PULMONARY NODULE: ICD-10-CM

## 2021-07-06 PROCEDURE — 99442 PR PHYSICIAN TELEPHONE EVALUATION 11-20 MIN: CPT | Mod: 95 | Performed by: INTERNAL MEDICINE

## 2021-07-06 RX ORDER — ROFLUMILAST 500 UG/1
500 TABLET ORAL DAILY
Qty: 30 TABLET | Refills: 2 | Status: SHIPPED | OUTPATIENT
Start: 2021-07-06

## 2021-07-06 RX ORDER — METOPROLOL TARTRATE 25 MG/1
12.5 TABLET, FILM COATED ORAL 2 TIMES DAILY
Qty: 90 TABLET | Refills: 1 | Status: SHIPPED | OUTPATIENT
Start: 2021-07-06

## 2021-07-06 NOTE — PROGRESS NOTES
Flavia is a 75 year old who is being evaluated via a billable telephone visit.      What phone number would you like to be contacted at? [phone]  How would you like to obtain your AVS? MyChart    Assessment & Plan     Pneumothorax on right  S/p chest tube. resolved    Persistent atrial fibrillation (H)  New- on eliquis. F/u fall. Cont metoprolol for rate control and anti-coag    Pulmonary nodule  6 mo f/u needed dec 2021    Panlobular emphysema (H)  Increase daliresp to 500 mg next fill   Overall, has returned to previous baseline- as poor as it is.     Review of external notes as documented elsewhere in note  Review of the result(s) of each unique test - labs/xrays  Assessment requiring an independent historian(s) - family - wife  Prescription drug management         F/u fall   See Patient Instructions    No follow-ups on file.    Filemon Fletcher MD  [facility]    Philly Alejandro is a 75 year old who presents for the following health issues  accompanied by his spouse:    [facility]     Hospital Follow-up Visit:    Hospital/Nursing Home/IP Rehab Facility: [facility]  Date of Admission: 06/13/2021  Date of Discharge: 06/20/2021, 06/27/2021 was discharge from [facility]  Reason(s) for Admission: Acute on chronic hypercapnic respiratory failure due to COPD exacerbation and spontaneous pneumothorax  Spontaneous right pneumothorax with progression to tension pneumothorax status post chest tube placement, now resolved  Systemic inflammatory response syndrome secondary to above, resolved  Atrial fibrillation with RVR, sinus rhythm at the time of discharge  History of atrial flutter, status post ablation  Acute metabolic encephalopathy secondary to hypercapnia/hypoxemia, resolved  7 mm right upper lobe lung nodule  Deconditioning      Was your hospitalization related to COVID-19? No   Problems taking medications regularly:  None  Medication changes since discharge:  None  Problems adhering to non-medication therapy:  None    Summary of hospitalization:  [facility] discharge summary reviewed  Diagnostic Tests/Treatments reviewed.  Follow up needed: none  Other Healthcare Providers Involved in Patient s Care:         None  Update since discharge: improved.       Post Discharge Medication Reconciliation: discharge medications reconciled and changed, per note/orders.  Plan of care communicated with patient and family                  Review of Systems         Objective           Vitals:  No vitals were obtained today due to virtual visit.    Physical Exam   alert and no distress  Remainder of exam unable to be completed due to telephone visits    Hospital labs/rad studies reviewed            Phone call duration: 16 minutes

## 2021-08-30 ENCOUNTER — APPOINTMENT (OUTPATIENT)
Dept: GENERAL RADIOLOGY | Facility: CLINIC | Age: 76
DRG: 190 | End: 2021-08-30
Attending: EMERGENCY MEDICINE
Payer: COMMERCIAL

## 2021-08-30 ENCOUNTER — HOSPITAL ENCOUNTER (INPATIENT)
Facility: CLINIC | Age: 76
LOS: 2 days | Discharge: HOME | DRG: 190 | End: 2021-09-01
Attending: EMERGENCY MEDICINE | Admitting: INTERNAL MEDICINE
Payer: COMMERCIAL

## 2021-08-30 DIAGNOSIS — R06.89 HYPERCARBIA: ICD-10-CM

## 2021-08-30 DIAGNOSIS — J44.1 COPD EXACERBATION (H): ICD-10-CM

## 2021-08-30 DIAGNOSIS — E87.1 HYPONATREMIA: ICD-10-CM

## 2021-08-30 DIAGNOSIS — J43.1 PANLOBULAR EMPHYSEMA (H): ICD-10-CM

## 2021-08-30 LAB
ANION GAP SERPL CALCULATED.3IONS-SCNC: <1 MMOL/L (ref 3–14)
BASE EXCESS BLDV CALC-SCNC: 9.2 MMOL/L (ref -7.7–1.9)
BASOPHILS # BLD AUTO: 0.1 10E3/UL (ref 0–0.2)
BASOPHILS NFR BLD AUTO: 1 %
BUN SERPL-MCNC: 14 MG/DL (ref 7–30)
CALCIUM SERPL-MCNC: 8.8 MG/DL (ref 8.5–10.1)
CHLORIDE BLD-SCNC: 89 MMOL/L (ref 94–109)
CO2 SERPL-SCNC: 38 MMOL/L (ref 20–32)
CREAT SERPL-MCNC: 0.62 MG/DL (ref 0.66–1.25)
EOSINOPHIL # BLD AUTO: 0 10E3/UL (ref 0–0.7)
EOSINOPHIL NFR BLD AUTO: 0 %
ERYTHROCYTE [DISTWIDTH] IN BLOOD BY AUTOMATED COUNT: 12.5 % (ref 10–15)
GFR SERPL CREATININE-BSD FRML MDRD: >90 ML/MIN/1.73M2
GLUCOSE BLD-MCNC: 106 MG/DL (ref 70–99)
HCO3 BLDV-SCNC: 39 MMOL/L (ref 21–28)
HCT VFR BLD AUTO: 38.7 % (ref 40–53)
HGB BLD-MCNC: 12.3 G/DL (ref 13.3–17.7)
IMM GRANULOCYTES # BLD: 0.1 10E3/UL
IMM GRANULOCYTES NFR BLD: 1 %
LACTATE SERPL-SCNC: 0.8 MMOL/L (ref 0.7–2)
LYMPHOCYTES # BLD AUTO: 1 10E3/UL (ref 0.8–5.3)
LYMPHOCYTES NFR BLD AUTO: 13 %
MCH RBC QN AUTO: 30.8 PG (ref 26.5–33)
MCHC RBC AUTO-ENTMCNC: 31.8 G/DL (ref 31.5–36.5)
MCV RBC AUTO: 97 FL (ref 78–100)
MONOCYTES # BLD AUTO: 1.3 10E3/UL (ref 0–1.3)
MONOCYTES NFR BLD AUTO: 18 %
NEUTROPHILS # BLD AUTO: 5 10E3/UL (ref 1.6–8.3)
NEUTROPHILS NFR BLD AUTO: 67 %
NRBC # BLD AUTO: 0 10E3/UL
NRBC BLD AUTO-RTO: 0 /100
NT-PROBNP SERPL-MCNC: 614 PG/ML (ref 0–1800)
O2/TOTAL GAS SETTING VFR VENT: 5 %
OXYHGB MFR BLDV: 69 % (ref 70–75)
PCO2 BLDV: 79 MM HG (ref 40–50)
PH BLDV: 7.3 [PH] (ref 7.32–7.43)
PLATELET # BLD AUTO: 331 10E3/UL (ref 150–450)
PO2 BLDV: 40 MM HG (ref 25–47)
POTASSIUM BLD-SCNC: 4.5 MMOL/L (ref 3.4–5.3)
RBC # BLD AUTO: 3.99 10E6/UL (ref 4.4–5.9)
SARS-COV-2 RNA RESP QL NAA+PROBE: NEGATIVE
SODIUM SERPL-SCNC: 127 MMOL/L (ref 133–144)
TROPONIN I SERPL-MCNC: <0.015 UG/L (ref 0–0.04)
WBC # BLD AUTO: 7.4 10E3/UL (ref 4–11)

## 2021-08-30 PROCEDURE — 250N000009 HC RX 250: Performed by: EMERGENCY MEDICINE

## 2021-08-30 PROCEDURE — 36415 COLL VENOUS BLD VENIPUNCTURE: CPT | Performed by: EMERGENCY MEDICINE

## 2021-08-30 PROCEDURE — 96365 THER/PROPH/DIAG IV INF INIT: CPT

## 2021-08-30 PROCEDURE — 120N000001 HC R&B MED SURG/OB

## 2021-08-30 PROCEDURE — 250N000013 HC RX MED GY IP 250 OP 250 PS 637: Performed by: INTERNAL MEDICINE

## 2021-08-30 PROCEDURE — 250N000009 HC RX 250: Performed by: INTERNAL MEDICINE

## 2021-08-30 PROCEDURE — 83880 ASSAY OF NATRIURETIC PEPTIDE: CPT | Performed by: EMERGENCY MEDICINE

## 2021-08-30 PROCEDURE — 94640 AIRWAY INHALATION TREATMENT: CPT

## 2021-08-30 PROCEDURE — 96375 TX/PRO/DX INJ NEW DRUG ADDON: CPT

## 2021-08-30 PROCEDURE — 96361 HYDRATE IV INFUSION ADD-ON: CPT

## 2021-08-30 PROCEDURE — 83605 ASSAY OF LACTIC ACID: CPT | Performed by: EMERGENCY MEDICINE

## 2021-08-30 PROCEDURE — 99223 1ST HOSP IP/OBS HIGH 75: CPT | Mod: AI | Performed by: INTERNAL MEDICINE

## 2021-08-30 PROCEDURE — 84484 ASSAY OF TROPONIN QUANT: CPT | Performed by: EMERGENCY MEDICINE

## 2021-08-30 PROCEDURE — 80048 BASIC METABOLIC PNL TOTAL CA: CPT | Performed by: EMERGENCY MEDICINE

## 2021-08-30 PROCEDURE — 94640 AIRWAY INHALATION TREATMENT: CPT | Mod: 76

## 2021-08-30 PROCEDURE — 258N000003 HC RX IP 258 OP 636: Performed by: EMERGENCY MEDICINE

## 2021-08-30 PROCEDURE — 250N000012 HC RX MED GY IP 250 OP 636 PS 637: Performed by: INTERNAL MEDICINE

## 2021-08-30 PROCEDURE — 87635 SARS-COV-2 COVID-19 AMP PRB: CPT | Performed by: EMERGENCY MEDICINE

## 2021-08-30 PROCEDURE — C9803 HOPD COVID-19 SPEC COLLECT: HCPCS

## 2021-08-30 PROCEDURE — 250N000011 HC RX IP 250 OP 636: Performed by: EMERGENCY MEDICINE

## 2021-08-30 PROCEDURE — 71045 X-RAY EXAM CHEST 1 VIEW: CPT

## 2021-08-30 PROCEDURE — 85025 COMPLETE CBC W/AUTO DIFF WBC: CPT | Performed by: EMERGENCY MEDICINE

## 2021-08-30 PROCEDURE — 96366 THER/PROPH/DIAG IV INF ADDON: CPT

## 2021-08-30 PROCEDURE — 93005 ELECTROCARDIOGRAM TRACING: CPT

## 2021-08-30 PROCEDURE — 999N000157 HC STATISTIC RCP TIME EA 10 MIN

## 2021-08-30 PROCEDURE — 99285 EMERGENCY DEPT VISIT HI MDM: CPT | Mod: 25

## 2021-08-30 PROCEDURE — 82805 BLOOD GASES W/O2 SATURATION: CPT | Performed by: EMERGENCY MEDICINE

## 2021-08-30 RX ORDER — SODIUM CHLORIDE 9 MG/ML
INJECTION, SOLUTION INTRAVENOUS ONCE
Status: COMPLETED | OUTPATIENT
Start: 2021-08-30 | End: 2021-08-30

## 2021-08-30 RX ORDER — ROFLUMILAST 500 UG/1
500 TABLET ORAL DAILY
Status: DISCONTINUED | OUTPATIENT
Start: 2021-08-30 | End: 2021-09-01 | Stop reason: HOSPADM

## 2021-08-30 RX ORDER — METHYLPREDNISOLONE SODIUM SUCCINATE 125 MG/2ML
125 INJECTION, POWDER, LYOPHILIZED, FOR SOLUTION INTRAMUSCULAR; INTRAVENOUS ONCE
Status: COMPLETED | OUTPATIENT
Start: 2021-08-30 | End: 2021-08-30

## 2021-08-30 RX ORDER — ONDANSETRON 4 MG/1
4 TABLET, ORALLY DISINTEGRATING ORAL EVERY 6 HOURS PRN
Status: DISCONTINUED | OUTPATIENT
Start: 2021-08-30 | End: 2021-09-01 | Stop reason: HOSPADM

## 2021-08-30 RX ORDER — IPRATROPIUM BROMIDE AND ALBUTEROL SULFATE 2.5; .5 MG/3ML; MG/3ML
3 SOLUTION RESPIRATORY (INHALATION) ONCE
Status: COMPLETED | OUTPATIENT
Start: 2021-08-30 | End: 2021-08-30

## 2021-08-30 RX ORDER — IPRATROPIUM BROMIDE AND ALBUTEROL SULFATE 2.5; .5 MG/3ML; MG/3ML
3 SOLUTION RESPIRATORY (INHALATION)
Status: DISCONTINUED | OUTPATIENT
Start: 2021-08-30 | End: 2021-09-01 | Stop reason: HOSPADM

## 2021-08-30 RX ORDER — POLYETHYLENE GLYCOL 3350 17 G/17G
17 POWDER, FOR SOLUTION ORAL DAILY PRN
Status: DISCONTINUED | OUTPATIENT
Start: 2021-08-30 | End: 2021-09-01 | Stop reason: HOSPADM

## 2021-08-30 RX ORDER — ALBUTEROL SULFATE 0.83 MG/ML
2.5 SOLUTION RESPIRATORY (INHALATION)
Status: DISCONTINUED | OUTPATIENT
Start: 2021-08-30 | End: 2021-09-01 | Stop reason: HOSPADM

## 2021-08-30 RX ORDER — METOPROLOL TARTRATE 1 MG/ML
2.5 INJECTION, SOLUTION INTRAVENOUS EVERY 4 HOURS PRN
Status: DISCONTINUED | OUTPATIENT
Start: 2021-08-30 | End: 2021-09-01 | Stop reason: HOSPADM

## 2021-08-30 RX ORDER — AMOXICILLIN 250 MG
2 CAPSULE ORAL 2 TIMES DAILY PRN
Status: DISCONTINUED | OUTPATIENT
Start: 2021-08-30 | End: 2021-09-01 | Stop reason: HOSPADM

## 2021-08-30 RX ORDER — ACETAMINOPHEN 325 MG/1
650 TABLET ORAL EVERY 6 HOURS PRN
Status: DISCONTINUED | OUTPATIENT
Start: 2021-08-30 | End: 2021-09-01 | Stop reason: HOSPADM

## 2021-08-30 RX ORDER — LIDOCAINE 40 MG/G
CREAM TOPICAL
Status: DISCONTINUED | OUTPATIENT
Start: 2021-08-30 | End: 2021-09-01 | Stop reason: HOSPADM

## 2021-08-30 RX ORDER — AZITHROMYCIN 500 MG/1
500 INJECTION, POWDER, LYOPHILIZED, FOR SOLUTION INTRAVENOUS ONCE
Status: COMPLETED | OUTPATIENT
Start: 2021-08-30 | End: 2021-08-30

## 2021-08-30 RX ORDER — AMOXICILLIN 250 MG
1 CAPSULE ORAL 2 TIMES DAILY PRN
Status: DISCONTINUED | OUTPATIENT
Start: 2021-08-30 | End: 2021-09-01 | Stop reason: HOSPADM

## 2021-08-30 RX ORDER — ACETAMINOPHEN 650 MG/1
650 SUPPOSITORY RECTAL EVERY 6 HOURS PRN
Status: DISCONTINUED | OUTPATIENT
Start: 2021-08-30 | End: 2021-09-01 | Stop reason: HOSPADM

## 2021-08-30 RX ORDER — ONDANSETRON 2 MG/ML
4 INJECTION INTRAMUSCULAR; INTRAVENOUS EVERY 6 HOURS PRN
Status: DISCONTINUED | OUTPATIENT
Start: 2021-08-30 | End: 2021-09-01 | Stop reason: HOSPADM

## 2021-08-30 RX ORDER — BISACODYL 10 MG
10 SUPPOSITORY, RECTAL RECTAL DAILY PRN
Status: DISCONTINUED | OUTPATIENT
Start: 2021-08-30 | End: 2021-09-01 | Stop reason: HOSPADM

## 2021-08-30 RX ORDER — AZITHROMYCIN 250 MG/1
250 TABLET, FILM COATED ORAL DAILY
Status: DISCONTINUED | OUTPATIENT
Start: 2021-08-31 | End: 2021-09-01 | Stop reason: HOSPADM

## 2021-08-30 RX ORDER — PREDNISONE 20 MG/1
60 TABLET ORAL 2 TIMES DAILY
Status: DISCONTINUED | OUTPATIENT
Start: 2021-08-30 | End: 2021-09-01

## 2021-08-30 RX ADMIN — METOPROLOL TARTRATE 12.5 MG: 25 TABLET, FILM COATED ORAL at 21:00

## 2021-08-30 RX ADMIN — AZITHROMYCIN MONOHYDRATE 500 MG: 500 INJECTION, POWDER, LYOPHILIZED, FOR SOLUTION INTRAVENOUS at 12:38

## 2021-08-30 RX ADMIN — APIXABAN 5 MG: 5 TABLET, FILM COATED ORAL at 21:00

## 2021-08-30 RX ADMIN — ROFLUMILAST 500 MCG: 500 TABLET ORAL at 19:52

## 2021-08-30 RX ADMIN — PREDNISONE 60 MG: 20 TABLET ORAL at 21:00

## 2021-08-30 RX ADMIN — IPRATROPIUM BROMIDE AND ALBUTEROL SULFATE 3 ML: .5; 3 SOLUTION RESPIRATORY (INHALATION) at 10:30

## 2021-08-30 RX ADMIN — ACETAMINOPHEN 650 MG: 325 TABLET, FILM COATED ORAL at 23:18

## 2021-08-30 RX ADMIN — METHYLPREDNISOLONE SODIUM SUCCINATE 125 MG: 125 INJECTION, POWDER, FOR SOLUTION INTRAMUSCULAR; INTRAVENOUS at 10:29

## 2021-08-30 RX ADMIN — SODIUM CHLORIDE: 9 INJECTION, SOLUTION INTRAVENOUS at 15:45

## 2021-08-30 RX ADMIN — IPRATROPIUM BROMIDE AND ALBUTEROL SULFATE 3 ML: .5; 3 SOLUTION RESPIRATORY (INHALATION) at 20:14

## 2021-08-30 RX ADMIN — FLUTICASONE FUROATE AND VILANTEROL TRIFENATATE 1 PUFF: 200; 25 POWDER RESPIRATORY (INHALATION) at 19:52

## 2021-08-30 ASSESSMENT — ENCOUNTER SYMPTOMS
NAUSEA: 0
COUGH: 0
SHORTNESS OF BREATH: 1
VOMITING: 0
ABDOMINAL PAIN: 0
FEVER: 0
DIARRHEA: 0

## 2021-08-30 ASSESSMENT — ACTIVITIES OF DAILY LIVING (ADL): ADLS_ACUITY_SCORE: 14

## 2021-08-30 ASSESSMENT — MIFFLIN-ST. JEOR: SCORE: 1461.13

## 2021-08-30 NOTE — ED NOTES
[facility]  ED Nurse Handoff Report    ED Chief complaint: Shortness of Breath      ED Diagnosis:   Final diagnoses:   COPD exacerbation (H)   Hyponatremia   Hypercarbia       Code Status: Full Code    Allergies: No Known Allergies    Patient Story: Pt presents with COPD and emphysema with concerns for pneumonia. Pt recently hospitalized for pneumonia in June of this year.   Focused Assessment:  +SOB. On baseline 3L O2    Treatments and/or interventions provided: Labs, xray, and Abx  Patient's response to treatments and/or interventions: unchanged    To be done/followed up on inpatient unit:  Unchanged    Does this patient have any cognitive concerns?: None    Activity level - Baseline/Home:  Independent  Activity Level - Current:   Stand with Assist    Patient's Preferred language: English   Needed?: No    Isolation: None  Infection: Not Applicable  Patient tested for COVID 19 prior to admission: YES  Bariatric?: No    Vital Signs:   Vitals:    08/30/21 1015 08/30/21 1030 08/30/21 1100 08/30/21 1102   BP:       Pulse: 90 99 96    Resp: 24 28 18    Temp:    98.5  F (36.9  C)   TempSrc:    Temporal   SpO2: 98% 98% 98%        Cardiac Rhythm:     Was the PSS-3 completed:   Yes  What interventions are required if any?               Family Comments: Wife at bedside  OBS brochure/video discussed/provided to patient/family: Yes              Name of person given brochure if not patient: NA              Relationship to patient: NA    For the majority of the shift this patient's behavior was Green.   Behavioral interventions performed were NA.    ED NURSE PHONE NUMBER: [id number]

## 2021-08-30 NOTE — ED NOTES
Bed: ED04  Expected date:   Expected time:   Means of arrival:   Comments:  Jenna - [phone] M MENA eta [phone]

## 2021-08-30 NOTE — PHARMACY-ADMISSION MEDICATION HISTORY
Pharmacy Medication History  Admission medication history interview status for the 8/30/2021  admission is complete. See EPIC admission navigator for prior to admission medications     Location of Interview: Patient room  Medication history sources: Patient, Patient's family/friend (wife), Surescripts and Care Everywhere    Significant changes made to the medication list:  none    In the past week, patient estimated taking medication this percent of the time: greater than 90%    Additional medication history information:   none    Medication reconciliation completed by provider prior to medication history? No    Time spent in this activity: 5 mins    Prior to Admission medications    Medication Sig Last Dose Taking? Auth Provider   acetaminophen (TYLENOL) 325 MG tablet Take 2 tablets (650 mg) by mouth every 4 hours as needed for mild pain  Yes Janice Hobbs MD   albuterol (PROAIR HFA) 108 (90 Base) MCG/ACT inhaler Inhale 1-2 puffs into the lungs every 4 hours as needed for shortness of breath / dyspnea  Yes Filemon Fletcher MD   albuterol (PROVENTIL) (2.5 MG/3ML) 0.083% neb solution Take 1 vial (2.5 mg) by nebulization every 2 hours as needed for shortness of breath / dyspnea  Yes Filemon Fletcher MD   ELIQUIS ANTICOAGULANT 5 MG tablet TAKE ONE TABLET BY MOUTH TWICE DAILY FOR AFIB 8/29/2021 at Unknown time Yes Filemon Fletcher MD   fluticasone-salmeterol (ADVAIR) 500-50 MCG/DOSE inhaler Inhale 1 puff into the lungs 2 times daily 8/29/2021 at Unknown time Yes Filemon Fletcher MD   melatonin 1 MG TABS tablet Take 1 tablet (1 mg) by mouth nightly as needed for sleep  Yes Janice Hobbs MD   metoprolol tartrate (LOPRESSOR) 25 MG tablet Take 0.5 tablets (12.5 mg) by mouth 2 times daily 8/29/2021 at Unknown time Yes Filemon Fletcher MD   roflumilast (DALIRESP) 500 MCG TABS tablet Take 1 tablet (500 mcg) by mouth daily 8/29/2021 at Unknown time Yes Filemon Fletcher MD   tiotropium (SPIRIVA HANDIHALER) 18  MCG inhaled capsule Inhale 1 capsule (18 mcg) into the lungs daily 8/29/2021 at Unknown time Yes Filemon Fletcher MD       The information provided in this note is only as accurate as the sources available at the time of update(s)

## 2021-08-30 NOTE — PROGRESS NOTES
RECEIVING UNIT ED HANDOFF REVIEW    ED Nurse Handoff Report was reviewed by: Charlie Soto RN on August 30, 2021 at 4:20 PM     Chart reviewed, ok to send pt

## 2021-08-30 NOTE — H&P
[facility]    History and Physical - Hospitalist Service       Date of Admission:  8/30/2021    Assessment & Plan      Mr. Luu is a 75-year-old male with a past medical history significant for severe oxygen-dependent chronic obstructive pulmonary disease and atrial flutter, status post ablation, currently in sinus rhythm, who presents to the Emergency Department with shortness of breath and found to have a small right pneumothorax.     Acute on chronic hypoxic and hypercapnic respiratory failure.   COPD Exacerbation in setting of severe oxygen dependent COPD.  History of pneumothorax during last hospitalization in June 2020 now resolved.   Chest x-ray does show small bilateral pleural effusions with no sign of infiltrate. VBG showed PCO2 of 79 on admission.  -Continue prednisone 60 mg twice daily and plan for slow taper if improving  -Changed to DuoNeb's 4 times daily scheduled (plan to continue this at discharge and discontinue home Spiriva since very limited ability to breath in the medication likely resulting in poor response to the Spiriva so hoping the nebulized duonebs would allow better response)  -Continue albuterol nebs every 2 hours as needed  -Z-Johnie to cover for atypical pneumonias or bronchitis  -continue supplemental oxygen and wean back to home regimen of 3 L by nasal cannula continuously as able  -Repeat VBG in a.m.  -avoid bipap and is DNR/DNI but could do trial of high flow oxygen if respiratory status worsens  -Discussed with patient that he is end-stage COPD and ome of these times will not respond to our therapy in which case we will need to focus on keeping him comfortable and avoiding respiratory distress.  He and his wife both expressed understanding and agreed to meet with hospice while here to talk through what it would mean to go home with hospice if he is not improving.  They were both very understanding and many of their questions were answered.     MAREN hansen  RVR  History of atrial flutter, status post ablation:    Patient has history of atrial flutter in 2015 and had ablation.  He had been doing fine until recently when he started to have atrial fibrillation with RVR again.    Was started on oral metoprolol during his stay in June.  -Continue PTA Metoprolol 25 mg twice daily with holding parameters  -Metoprolol 2.5 mg IV PRN for HR > 120  -continue home eliquis (started by cardiology in June 2020)     Hyponatremia.    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    -repeat BMP in AM  -if worsening would need to do fluid restriction and check urine sodium, may benefit from a short course of Lasix as well     Recent episode of acute metabolic encephalopathy due to hypercapnia and hypoxemia during last hospitalization in June 2020.  -continue to monitor for any confusion  -He notes that he switched his Roflumilast to nighttime dosing and that seems to have helped  -could trial High Flow Oxygen if needed but would hot help with poor ventilation status so options are limited      7 mm right upper lobe lung nodule found in June 2020:   -- Patient can follow up with his PCP for monitoring of this. Could consider repeat imaging in 6-12 months.     Deconditioning  PT/OT recommended TCU with last hospitalization and was discharged to TCU on 6/20/2021. Patient would like to return home after this stay.  -hold off on PT/OT consult for now because of poor respiratory status but if improving could consult PT/OT for assessment and discharge planning tomorrow or the next day      Diet:   Regular diet  DVT Prophylaxis: PCD's and home DOAC  Clements Catheter: Not present  Central Lines: None  Code Status:   DNR/DNI, discussed and he confirmed again he would not want intubation, resuscitation or BIPAP    Clinically Significant Risk Factors Present on Admission         # Hyponatremia: Na = 127 mmol/L (Ref range: 133 - 144 mmol/L) on admission, will monitor as appropriate     # Coagulation Defect:  home medication list includes an anticoagulant medication       Disposition Plan   Expected discharge: 09/02/2021 recommended to prior living arrangement once O2 use less than 4 liters/minute and respiratory status improved or discharging home with hospice.     The patient's care was discussed with the Patient, Patient's Family and ED physician.    Earl Herzog MD  [facility]  Securely message with the Vocera Web Console (learn more here)  Text page via [facility] Paging/Directory      ______________________________________________________________________    Chief Complaint   Shortness of breath    History is obtained from the patient, electronic health record and emergency department physician    History of Present Illness   Mr. Luu is a 75-year-old male with past medical history significant for severe oxygen-dependent COPD on 3L NC with last admission in June 2021, hypertension and atrial flutter status post ablation on chronic anticoabulation who presents to the Emergency Department with shortness of breath. Yesterday evening he reports SOB that was worse than normal when standing and moving. This morning he said that the SOB was worsening so much that it continued even when sitting. He says that his reader at home says that his oxygen levels are within reason. Uses nebulizer at home, but infrequent use only about once per day. He denies fever, diarrhea, nausea and vomiting. He also denies abnormal cough, chest pain, abdominal pain. He is not on chronic oral steroids but does take advair. He has previously had a collapsed lung and has previously had elevated CO2 levels. He does not take diuretics or lasix. He denies the use of new mediations.   No issues in the ED showed patient in mild respiratory distress with tachypnea and labored breathing with decreased air movement.  VBG showed PCO2 of 79 which is decreased from his worse CO2 during his last hospitalization in June when it was  150.  He says he does not tolerant of BiPAP and try to last time without a good response.  He is DNR/DNI.  He would trial high flow oxygen.  Chest x-ray showed small bilateral pleural effusions with no sign of infiltrate.  He was given 125 mg of Solu-Medrol and 2 nebulizer treatments but says it has not improved things very much.  I was requested to admit the patient for further evaluation and treatment.    Review of Systems    The 10 point Review of Systems is negative other than noted in the HPI or here.    Past Medical History    I have reviewed this patient's medical history and updated it with pertinent information if needed.   Past Medical History:   Diagnosis Date     Atrial flutter (H)     sp ablation     COPD (chronic obstructive pulmonary disease) (H)      Emphysema of lung (H)      PVC (premature ventricular contraction)     LVOT, moderately frequent     Tobacco use disorder        Past Surgical History   I have reviewed this patient's surgical history and updated it with pertinent information if needed.  Past Surgical History:   Procedure Laterality Date     CATARACT IOL, RT/LT      bilat     EYE SURGERY       H ABLATION ATRIAL FLUTTER  2013          ZZC NONSPECIFIC PROCEDURE      vv stripping on left       Social History   I have reviewed this patient's social history and updated it with pertinent information if needed.  Social History     Tobacco Use     Smoking status: Former Smoker     Packs/day: 1.00     Years: 43.00     Pack years: 43.00     Types: Cigarettes     Quit date: 2008     Years since quittin.7     Smokeless tobacco: Never Used   Substance Use Topics     Alcohol use: Yes     Alcohol/week: 0.0 standard drinks     Comment: very rare     Drug use: No       Family History   I have reviewed this patient's family history and updated it with pertinent information if needed.  Family History   Problem Relation Age of Onset     Cancer Mother      Diabetes Father        Prior to  Admission Medications   Prior to Admission Medications   Prescriptions Last Dose Informant Patient Reported? Taking?   ELIQUIS ANTICOAGULANT 5 MG tablet 8/29/2021 at Unknown time Self No Yes   Sig: TAKE ONE TABLET BY MOUTH TWICE DAILY FOR AFIB   acetaminophen (TYLENOL) 325 MG tablet  Self No Yes   Sig: Take 2 tablets (650 mg) by mouth every 4 hours as needed for mild pain   albuterol (PROAIR HFA) 108 (90 Base) MCG/ACT inhaler  Self No Yes   Sig: Inhale 1-2 puffs into the lungs every 4 hours as needed for shortness of breath / dyspnea   albuterol (PROVENTIL) (2.5 MG/3ML) 0.083% neb solution  Self No Yes   Sig: Take 1 vial (2.5 mg) by nebulization every 2 hours as needed for shortness of breath / dyspnea   fluticasone-salmeterol (ADVAIR) 500-50 MCG/DOSE inhaler 8/29/2021 at Unknown time Self No Yes   Sig: Inhale 1 puff into the lungs 2 times daily   melatonin 1 MG TABS tablet  Self No Yes   Sig: Take 1 tablet (1 mg) by mouth nightly as needed for sleep   metoprolol tartrate (LOPRESSOR) 25 MG tablet 8/29/2021 at Unknown time Self No Yes   Sig: Take 0.5 tablets (12.5 mg) by mouth 2 times daily   roflumilast (DALIRESP) 500 MCG TABS tablet 8/29/2021 at Unknown time Self No Yes   Sig: Take 1 tablet (500 mcg) by mouth daily   tiotropium (SPIRIVA HANDIHALER) 18 MCG inhaled capsule 8/29/2021 at Unknown time Self No Yes   Sig: Inhale 1 capsule (18 mcg) into the lungs daily      Facility-Administered Medications: None     Allergies   No Known Allergies    Physical Exam   Vital Signs: Temp: 98.5  F (36.9  C) Temp src: Temporal BP: (!) 179/76 Pulse: 96   Resp: 18 SpO2: 98 % O2 Device: Nasal cannula    Weight: 0 lbs 0 oz  Constitutional: Awake, alert, cooperative, moderate respiratory distress with very shallow tachypneic breathing but is able to talk in 2-3 word phrases.  Eyes: Conjunctiva and pupils examined and normal.  HEENT: Moist mucous membranes, normal dentition.  Respiratory: Diffuse decreased breath sounds especially at  the bases, some minimal air movement heard in the upper lobes bilaterally, no crackles, minimal to no wheezing.  Cardiovascular: Regular rhythm, mild tachycardia, normal S1 and S2, and no murmur noted.  GI: Soft, non-distended, non-tender, normal bowel sounds.  Lymph/Hematologic: No anterior cervical or supraclavicular adenopathy.  Skin: No rashes, no cyanosis, bruising and discoloration of the legs secondary to chronic anticoagulation, mild 1+ bilateral edema.  Musculoskeletal: No joint swelling, erythema or tenderness.  Neurologic: Cranial nerves 2-12 intact, normal strength and sensation.  Psychiatric: Alert, oriented to person, place and time, no obvious anxiety or depression.      Data   Data reviewed today: I reviewed all medications, new labs and imaging results over the last 24 hours. I personally reviewed the EKG tracing showing Normal sinus rhythm with some possible septal ischemic changes that do not seem significant.    Recent Labs   Lab 08/30/21  0959   WBC 7.4   HGB 12.3*   MCV 97      *   POTASSIUM 4.5   CHLORIDE 89*   CO2 38*   BUN 14   CR 0.62*   ANIONGAP <1*   SAMIRA 8.8   *   TROPONIN <0.015     Recent Results (from the past 24 hour(s))   XR Chest Port 1 View    Narrative    CHEST PORTABLE ONE VIEW   8/30/2021 11:11 AM     HISTORY: Shortness of breath, chronic obstructive pulmonary disease,  history of pneumothorax.    COMPARISON: 6/18/2021.      Impression    IMPRESSION: Trace bibasilar pleural fluid. This is just slightly more  prominent at the right base. No new airspace disease. Normal cardiac  silhouette. No evidence for pneumothorax.

## 2021-08-31 LAB
ANION GAP SERPL CALCULATED.3IONS-SCNC: 1 MMOL/L (ref 3–14)
ATRIAL RATE - MUSE: 89 BPM
BASE EXCESS BLDV CALC-SCNC: 9.5 MMOL/L (ref -7.7–1.9)
BUN SERPL-MCNC: 16 MG/DL (ref 7–30)
CALCIUM SERPL-MCNC: 8.9 MG/DL (ref 8.5–10.1)
CHLORIDE BLD-SCNC: 92 MMOL/L (ref 94–109)
CO2 SERPL-SCNC: 35 MMOL/L (ref 20–32)
CREAT SERPL-MCNC: 0.64 MG/DL (ref 0.66–1.25)
DIASTOLIC BLOOD PRESSURE - MUSE: NORMAL MMHG
GFR SERPL CREATININE-BSD FRML MDRD: >90 ML/MIN/1.73M2
GLUCOSE BLD-MCNC: 130 MG/DL (ref 70–99)
HCO3 BLDV-SCNC: 38 MMOL/L (ref 21–28)
INTERPRETATION ECG - MUSE: NORMAL
O2/TOTAL GAS SETTING VFR VENT: 3 %
P AXIS - MUSE: 80 DEGREES
PCO2 BLDV: 70 MM HG (ref 40–50)
PH BLDV: 7.34 [PH] (ref 7.32–7.43)
PO2 BLDV: 38 MM HG (ref 25–47)
POTASSIUM BLD-SCNC: 4.7 MMOL/L (ref 3.4–5.3)
PR INTERVAL - MUSE: 150 MS
QRS DURATION - MUSE: 80 MS
QT - MUSE: 346 MS
QTC - MUSE: 420 MS
R AXIS - MUSE: 9 DEGREES
SODIUM SERPL-SCNC: 128 MMOL/L (ref 133–144)
SYSTOLIC BLOOD PRESSURE - MUSE: NORMAL MMHG
T AXIS - MUSE: 53 DEGREES
VENTRICULAR RATE- MUSE: 89 BPM

## 2021-08-31 PROCEDURE — 82803 BLOOD GASES ANY COMBINATION: CPT | Performed by: INTERNAL MEDICINE

## 2021-08-31 PROCEDURE — 94640 AIRWAY INHALATION TREATMENT: CPT

## 2021-08-31 PROCEDURE — 999N000157 HC STATISTIC RCP TIME EA 10 MIN

## 2021-08-31 PROCEDURE — 250N000009 HC RX 250: Performed by: INTERNAL MEDICINE

## 2021-08-31 PROCEDURE — 36415 COLL VENOUS BLD VENIPUNCTURE: CPT | Performed by: INTERNAL MEDICINE

## 2021-08-31 PROCEDURE — 99232 SBSQ HOSP IP/OBS MODERATE 35: CPT | Performed by: INTERNAL MEDICINE

## 2021-08-31 PROCEDURE — 80048 BASIC METABOLIC PNL TOTAL CA: CPT | Performed by: INTERNAL MEDICINE

## 2021-08-31 PROCEDURE — 94640 AIRWAY INHALATION TREATMENT: CPT | Mod: 76

## 2021-08-31 PROCEDURE — 250N000013 HC RX MED GY IP 250 OP 250 PS 637: Performed by: INTERNAL MEDICINE

## 2021-08-31 PROCEDURE — 250N000012 HC RX MED GY IP 250 OP 636 PS 637: Performed by: INTERNAL MEDICINE

## 2021-08-31 PROCEDURE — 120N000001 HC R&B MED SURG/OB

## 2021-08-31 RX ADMIN — PREDNISONE 60 MG: 20 TABLET ORAL at 21:58

## 2021-08-31 RX ADMIN — Medication 1 MG: at 01:07

## 2021-08-31 RX ADMIN — Medication 1 MG: at 21:58

## 2021-08-31 RX ADMIN — AZITHROMYCIN MONOHYDRATE 250 MG: 250 TABLET ORAL at 07:23

## 2021-08-31 RX ADMIN — METOPROLOL TARTRATE 12.5 MG: 25 TABLET, FILM COATED ORAL at 07:23

## 2021-08-31 RX ADMIN — FLUTICASONE FUROATE AND VILANTEROL TRIFENATATE 1 PUFF: 200; 25 POWDER RESPIRATORY (INHALATION) at 08:49

## 2021-08-31 RX ADMIN — APIXABAN 5 MG: 5 TABLET, FILM COATED ORAL at 07:22

## 2021-08-31 RX ADMIN — METOPROLOL TARTRATE 12.5 MG: 25 TABLET, FILM COATED ORAL at 21:58

## 2021-08-31 RX ADMIN — IPRATROPIUM BROMIDE AND ALBUTEROL SULFATE 3 ML: .5; 3 SOLUTION RESPIRATORY (INHALATION) at 15:52

## 2021-08-31 RX ADMIN — PREDNISONE 60 MG: 20 TABLET ORAL at 07:23

## 2021-08-31 RX ADMIN — APIXABAN 5 MG: 5 TABLET, FILM COATED ORAL at 21:58

## 2021-08-31 RX ADMIN — IPRATROPIUM BROMIDE AND ALBUTEROL SULFATE 3 ML: .5; 3 SOLUTION RESPIRATORY (INHALATION) at 09:10

## 2021-08-31 RX ADMIN — ACETAMINOPHEN 650 MG: 325 TABLET, FILM COATED ORAL at 07:22

## 2021-08-31 RX ADMIN — IPRATROPIUM BROMIDE AND ALBUTEROL SULFATE 3 ML: .5; 3 SOLUTION RESPIRATORY (INHALATION) at 19:40

## 2021-08-31 RX ADMIN — ROFLUMILAST 500 MCG: 500 TABLET ORAL at 07:23

## 2021-08-31 ASSESSMENT — ACTIVITIES OF DAILY LIVING (ADL)
ADLS_ACUITY_SCORE: 14

## 2021-08-31 NOTE — PLAN OF CARE
Summary:   COPD exacerbation   DATE & TIME 08/31/2021 2302-7227  Cognitive Concerns/ Orientation : A&Ox4  BEHAVIOR & AGGRESSION TOOL COLOR: Green  CIWA SCORE: N/A  ABNL VS/O2: 3L O2 Nasal canula. HENRIQUEZ, takes long time to recover after activities. Lungs diminished.   MOBILITY: SBA with GB to bedside commode, otherwise, in bed in high ruby position.   PAIN MANAGMENT: PRN tylenol once for mild back pain.   DIET: Regular, good appetite.   BOWEL/BLADDER: Continent, uses urinal and commode.   ABNL LAB/BG: Na 128,ABG showed compensated Resp. Acidosis (improved from yesterday).   DRAIN/DEVICES: R PIV  TELEMETRY RHYTHM:na  SKIN: warm, dry, flaky.   TESTS/PROCEDURES: na   D/C DAY/GOALS/PLACE: possibly today or tomorrow    OTHER IMPORTANT INFO:  will provide hospice info.

## 2021-08-31 NOTE — PLAN OF CARE
Admission    Patient arrives to room 601-1 via cart from ED.  Care plan note: HTN, tachycardic, irregular (hx of afib, PRN available if HR>120). Denied pain. A&OX4. SBA x1 with GB. HENRIQUEZ. On 3LPM NC. Continent of B&B.  consulted for possible hospice. Pt stable, monitor.     Inpatient nursing criteria listed below were met:    Did you put disposition on whiteboard and in sticky note: NA  PCD's Documented: Yes  Skin issues/needs documented :NA  Isolation education started/completed NA  Patient allergies verified with patient: Yes  Verified completion of [place] Risk Assessment Tool:  Yes  Verified completion of Guardianship screening tool: No  Fall Prevention: Care plan updated, Education given and documented Yes  Care Plan initiated: Yes  Home medications documented in belongings flowsheet: NA  Patient belongings documented in belongings flowsheet: Yes  Reminder note (belongings/ medications) placed in discharge instructions:Yes  Admission profile/ required documentation complete: Yes  Bedside Report Letter given and explained to patient NA  Visitor Designated? No  If patient is a 72 hour hold/Commitment are belongings removed from room and locked up? No

## 2021-08-31 NOTE — PROGRESS NOTES
[facility]    Hospitalist Progress Note    Interval History   - Reports feeling improved today, 8/10  - Daughter at bedside updated  - Continue treatment for another day, possible discharge tomorrow    Assessment & Plan   Summary: Flavia Luu is a 76 year old male with PMH severe oxygen-dependent chronic obstructive pulmonary disease and atrial flutter, status post ablation, currently in sinus rhythm, who was admitted on 8/30/2021 with a COPD exacerbation.     Acute on chronic hypoxic and hypercapnic respiratory failure  COPD Exacerbation in setting of severe oxygen dependent COPD  History of pneumothorax during last hospitalization in June 2020 now resolved  Chest x-ray does show small bilateral pleural effusions with no sign of infiltrate. VBG showed PCO2 of 79 on admission. VBG on 8/31 shows improving PCO2 to 70, clinically improved as well. Back to baseline O2 of 3L on 8/31/2021.  - Continue prednisone 60 mg twice daily and plan for slow taper if improving  - Changed to DuoNeb's 4 times daily scheduled (plan to continue this at discharge and discontinue home Spiriva since very limited ability to breath in the medication likely resulting in poor response to the Spiriva so hoping the nebulized duonebs would allow better response)  - Continue albuterol nebs every 2 hours as needed  - Z-Johnie to cover for atypical pneumonias or bronchitis  -continue supplemental oxygen and wean back to home regimen of 3 L by nasal cannula continuously as able  -avoid bipap and is DNR/DNI but could do trial of high flow oxygen if respiratory status worsens  - Hospice consult pending, patient reports that his wife makes decisions so he will want her involved. At the very least they should see hospice as an outpatient.  - Avoid BiPap due to history of Pneumothorax    A. fib with RVR  History of atrial flutter, status post ablation:    Patient has history of atrial flutter in 2015 and had ablation.  He had been  doing fine until recently when he started to have atrial fibrillation with RVR again. Was started on oral metoprolol during his stay in June. RVR improved in afternoon of 8/30.  - Continue PTA Metoprolol 25 mg twice daily with holding parameters  - Metoprolol 2.5 mg IV PRN for HR > 120  - Continue home eliquis (started by cardiology in June 2020)     Hyponatremia.    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 Na improving to 128 on 8/31.  - Continue to monitor     Recent episode of acute metabolic encephalopathy due to hypercapnia and hypoxemia during last hospitalization in June 2020.  - Continue to monitor for any confusion  - He notes that he switched his Roflumilast to nighttime dosing and that seems to have helped     7 mm right upper lobe lung nodule found in June 2020:   - Patient can follow up with his PCP for monitoring of this. Could consider repeat imaging in 6-12 months.     Deconditioning  PT/OT recommended TCU with last hospitalization and was discharged to TCU on 6/20/2021. Patient would like to return home after this stay.  -hold off on PT/OT consult for now because of poor respiratory status but if improving could consult PT/OT for assessment and discharge planning tomorrow or the next day     DVT Prophylaxis: Pneumatic Compression Devices  Code Status: No CPR- Do NOT Intubate  PT/OT: ordered  Diet: Combination Diet Regular Diet Adult      Disposition: Expected discharge 1-2 days    - I spent 25 minutes, with greater than 50% spent in counseling and coordination of care with the patient and daughter.    Keyshawn Gilbert MD  Text Page  (7am to 6pm)  -Data reviewed today: I reviewed all new labs and imaging results over the last 24 hours.    Physical Exam   Temp: 98.4  F (36.9  C) Temp src: Oral BP: 120/65 Pulse: 81   Resp: 20 SpO2: 94 % O2 Device: Nasal cannula Oxygen Delivery: 3 LPM  Vitals:    08/30/21 1752   Weight: 71.9 kg (158 lb 8.2 oz)     Vital Signs with Ranges  Temp:  [97.5  F (36.4   C)-98.4  F (36.9  C)] 98.4  F (36.9  C)  Pulse:  [] 81  Resp:  [16-24] 20  BP: (119-172)/(64-89) 120/65  SpO2:  [92 %-98 %] 94 %  I/O last 3 completed shifts:  In: 360 [P.O.:360]  Out: 630 [Urine:630]  O2 requirements: yes 3L    Constitutional: Elderly in NAD  HEENT: Eyes nonicteric, oral mucosa moist  Cardiovascular: RRR, normal S1/2, no m/r/g  Respiratory: distant lung sounds, mild basilar crackles, no wheezing appreciated  Vascular: Trace R>L LE pitting edema, noted bilateral chronic venous stasis R > L  GI: Nontender, nondistended  Skin/Integumen: No rashes  Neuro/Psych: Appropriate affect and mood. A&Ox3, moves all extremities    Medications     - MEDICATION INSTRUCTIONS -         apixaban ANTICOAGULANT  5 mg Oral BID     azithromycin  250 mg Oral Daily     fluticasone-vilanterol  1 puff Inhalation Daily     ipratropium - albuterol 0.5 mg/2.5 mg/3 mL  3 mL Nebulization 4x daily     metoprolol tartrate  12.5 mg Oral BID     predniSONE  60 mg Oral BID     roflumilast  500 mcg Oral Daily     sodium chloride (PF)  3 mL Intracatheter Q8H       Data   Recent Labs   Lab 08/31/21  0755 08/30/21  0959   WBC  --  7.4   HGB  --  12.3*   MCV  --  97   PLT  --  331   * 127*   POTASSIUM 4.7 4.5   CHLORIDE 92* 89*   CO2 35* 38*   BUN 16 14   CR 0.64* 0.62*   ANIONGAP 1* <1*   SAMIRA 8.9 8.8   * 106*   TROPONIN  --  <0.015       Imaging:   No results found for this or any previous visit (from the past 24 hour(s)).

## 2021-08-31 NOTE — PLAN OF CARE
Summary:   COPD exacerbation   DATE & TIME 08/30/21  Cognitive Concerns/ Orientation : A&Ox4  BEHAVIOR & AGGRESSION TOOL COLOR: Green  CIWA SCORE: N/A  ABNL VS/O2: 3L O2 Nasal canula   MOBILITY: SBA  PAIN MANAGMENT: denied pain   DIET: Regular   BOWEL/BLADDER: Continent   ABNL LAB/BG: Na 127  DRAIN/DEVICES: R PIV  TELEMETRY RHYTHM:  SKIN: warm, dry   TESTS/PROCEDURES:   D/C DAY/GOALS/PLACE: possibly today or tomorrow    OTHER IMPORTANT INFO: potassium protocol

## 2021-09-01 ENCOUNTER — APPOINTMENT (OUTPATIENT)
Dept: PHYSICAL THERAPY | Facility: CLINIC | Age: 76
DRG: 190 | End: 2021-09-01
Attending: INTERNAL MEDICINE
Payer: COMMERCIAL

## 2021-09-01 VITALS
SYSTOLIC BLOOD PRESSURE: 121 MMHG | HEIGHT: 71 IN | HEART RATE: 90 BPM | WEIGHT: 158.51 LBS | BODY MASS INDEX: 22.19 KG/M2 | OXYGEN SATURATION: 95 % | DIASTOLIC BLOOD PRESSURE: 78 MMHG | RESPIRATION RATE: 28 BRPM | TEMPERATURE: 98.1 F

## 2021-09-01 LAB
ANION GAP SERPL CALCULATED.3IONS-SCNC: 3 MMOL/L (ref 3–14)
BUN SERPL-MCNC: 19 MG/DL (ref 7–30)
CALCIUM SERPL-MCNC: 8.9 MG/DL (ref 8.5–10.1)
CHLORIDE BLD-SCNC: 93 MMOL/L (ref 94–109)
CO2 SERPL-SCNC: 35 MMOL/L (ref 20–32)
CREAT SERPL-MCNC: 0.73 MG/DL (ref 0.66–1.25)
GFR SERPL CREATININE-BSD FRML MDRD: 89 ML/MIN/1.73M2
GLUCOSE BLD-MCNC: 126 MG/DL (ref 70–99)
POTASSIUM BLD-SCNC: 4.5 MMOL/L (ref 3.4–5.3)
SODIUM SERPL-SCNC: 131 MMOL/L (ref 133–144)

## 2021-09-01 PROCEDURE — 250N000009 HC RX 250: Performed by: INTERNAL MEDICINE

## 2021-09-01 PROCEDURE — 97530 THERAPEUTIC ACTIVITIES: CPT | Mod: GP | Performed by: PHYSICAL THERAPIST

## 2021-09-01 PROCEDURE — 97116 GAIT TRAINING THERAPY: CPT | Mod: GP | Performed by: PHYSICAL THERAPIST

## 2021-09-01 PROCEDURE — 99239 HOSP IP/OBS DSCHRG MGMT >30: CPT | Performed by: INTERNAL MEDICINE

## 2021-09-01 PROCEDURE — 36415 COLL VENOUS BLD VENIPUNCTURE: CPT | Performed by: INTERNAL MEDICINE

## 2021-09-01 PROCEDURE — 97161 PT EVAL LOW COMPLEX 20 MIN: CPT | Mod: GP | Performed by: PHYSICAL THERAPIST

## 2021-09-01 PROCEDURE — 80048 BASIC METABOLIC PNL TOTAL CA: CPT | Performed by: INTERNAL MEDICINE

## 2021-09-01 PROCEDURE — 999N000157 HC STATISTIC RCP TIME EA 10 MIN

## 2021-09-01 PROCEDURE — 94640 AIRWAY INHALATION TREATMENT: CPT

## 2021-09-01 PROCEDURE — 250N000013 HC RX MED GY IP 250 OP 250 PS 637: Performed by: INTERNAL MEDICINE

## 2021-09-01 PROCEDURE — 250N000012 HC RX MED GY IP 250 OP 636 PS 637: Performed by: INTERNAL MEDICINE

## 2021-09-01 RX ORDER — IPRATROPIUM BROMIDE AND ALBUTEROL SULFATE 2.5; .5 MG/3ML; MG/3ML
3 SOLUTION RESPIRATORY (INHALATION) 4 TIMES DAILY
Qty: 360 ML | Refills: 0 | Status: SHIPPED | OUTPATIENT
Start: 2021-09-01

## 2021-09-01 RX ORDER — PREDNISONE 20 MG/1
40 TABLET ORAL DAILY
Status: DISCONTINUED | OUTPATIENT
Start: 2021-09-02 | End: 2021-09-01 | Stop reason: HOSPADM

## 2021-09-01 RX ORDER — BENZONATATE 100 MG/1
100 CAPSULE ORAL 3 TIMES DAILY PRN
Qty: 60 CAPSULE | Refills: 0 | Status: SHIPPED | OUTPATIENT
Start: 2021-09-01

## 2021-09-01 RX ORDER — PREDNISONE 10 MG/1
TABLET ORAL
Qty: 26 TABLET | Refills: 0 | Status: ON HOLD | OUTPATIENT
Start: 2021-09-02 | End: 2021-09-09

## 2021-09-01 RX ORDER — BENZONATATE 100 MG/1
100 CAPSULE ORAL 3 TIMES DAILY PRN
Status: DISCONTINUED | OUTPATIENT
Start: 2021-09-01 | End: 2021-09-01 | Stop reason: HOSPADM

## 2021-09-01 RX ORDER — AZITHROMYCIN 250 MG/1
250 TABLET, FILM COATED ORAL DAILY
Qty: 2 TABLET | Refills: 0 | Status: ON HOLD | OUTPATIENT
Start: 2021-09-02 | End: 2021-09-09

## 2021-09-01 RX ADMIN — IPRATROPIUM BROMIDE AND ALBUTEROL SULFATE 3 ML: .5; 3 SOLUTION RESPIRATORY (INHALATION) at 10:51

## 2021-09-01 RX ADMIN — APIXABAN 5 MG: 5 TABLET, FILM COATED ORAL at 08:24

## 2021-09-01 RX ADMIN — IPRATROPIUM BROMIDE AND ALBUTEROL SULFATE 3 ML: .5; 3 SOLUTION RESPIRATORY (INHALATION) at 06:36

## 2021-09-01 RX ADMIN — FLUTICASONE FUROATE AND VILANTEROL TRIFENATATE 1 PUFF: 200; 25 POWDER RESPIRATORY (INHALATION) at 08:24

## 2021-09-01 RX ADMIN — AZITHROMYCIN MONOHYDRATE 250 MG: 250 TABLET ORAL at 08:24

## 2021-09-01 RX ADMIN — PREDNISONE 60 MG: 20 TABLET ORAL at 08:24

## 2021-09-01 RX ADMIN — ROFLUMILAST 500 MCG: 500 TABLET ORAL at 08:24

## 2021-09-01 RX ADMIN — GUAIFENESIN 10 ML: 200 SOLUTION ORAL at 09:55

## 2021-09-01 RX ADMIN — METOPROLOL TARTRATE 12.5 MG: 25 TABLET, FILM COATED ORAL at 08:24

## 2021-09-01 ASSESSMENT — ACTIVITIES OF DAILY LIVING (ADL)
ADLS_ACUITY_SCORE: 14

## 2021-09-01 NOTE — PLAN OF CARE
Summary:   COPD exacerbation   DATE & TIME 08/31/2021 [id number]  Cognitive Concerns/ Orientation : A&Ox4  BEHAVIOR & AGGRESSION TOOL COLOR: Green  CIWA SCORE: N/A  ABNL VS/O2: 3L O2 Nasal canula. HENRIQUEZ, takes long time to recover after activities. Lungs diminished.   MOBILITY: SBA with GB to bedside commode, otherwise, in bed in high ruby position. Not OOB this shift  PAIN MANAGMENT: PRN tylenol once for mild back pain.   DIET: Regular, good appetite.   BOWEL/BLADDER: Continent, uses urinal and commode.   ABNL LAB/BG: Na 128, ABG showed compensated Resp. Acidosis (improved from yesterday).   DRAIN/DEVICES: R PIV saline locked  TELEMETRY RHYTHM:na  SKIN: warm, dry, flaky.   TESTS/PROCEDURES: na   D/C DAY/GOALS/PLACE: possibly 9/1 to home most likely over TCU, he is @ his baseline O2    OTHER IMPORTANT INFO:  will provide hospice info.

## 2021-09-01 NOTE — PROGRESS NOTES
Discharge    Patient discharged to home  via own transportation with wife.   Care plan note: vss, tachypnea, otherwise, VSS, on 3LPM NC at baseline. HENRIQUEZ with activities. Discharge meds filled here and given to pt. Pt is to follow up with PCP as scheduled. Pt will have home RN, whom can discuss about hospice options with him and wife. Both wife and pt verbalized understanding of discharge info.     Listed belongings gathered and returned to patient. Yes  Belongings returned to patient from security/pharmacy Yes  Care Plan and Patient education resolved: Yes  Prescriptions if needed, hard copies sent with patient  NA  Home and hospital acquired medications returned to patient: NA  Medication Bin checked and emptied on discharge Yes  Follow up appointment made for patient: Yes

## 2021-09-01 NOTE — PLAN OF CARE
Physical Therapy Discharge Summary    Reason for therapy discharge:    Discharged to home.    Progress towards therapy goal(s). See goals on Care Plan in [facility] electronic health record for goal details.  Goals met    Therapy recommendation(s):    Assist from spouse as needed for mobility and cares; use of walker

## 2021-09-01 NOTE — DISCHARGE SUMMARY
[facility]    Hospitalist Discharge Summary       Date of Admission:  8/30/2021  Date of Discharge:  9/1/2021  Discharging Provider: Keyshawn Gilbert MD      Discharge Diagnoses   COPD exacerbation  Acute hypoxic and hypercapneic respiratory failure, improved  History of pneumothorax June 2020  Atrial fibrillation with RVR, improved  Hyponatremia, improved  RUL lung nodule    Follow-ups Needed After Discharge   Follow-up Appointments     Follow-up and recommended labs and tests       Follow up with primary care provider, Filemon Fletcher, within 7 days   for hospital follow- up.  The following labs/tests are recommended: basic   metabolic panel.    - Make an appointment with hospice within a month to learn about hospice   and see whether you are interested.  - If you do not have an appointment with a lung specialist, consider   making one in about 1-2 months.           Hospital Course   Flavia Luu is a 76 year old male with PMH severe oxygen-dependent chronic obstructive pulmonary disease and atrial flutter, status post ablation, who was admitted on 8/30/2021 with a COPD exacerbation.    Chest x-ray does show small bilateral pleural effusions with no sign of infiltrate. VBG showed PCO2 of 79 on admission. VBG on 8/31 shows improving PCO2 to 70, clinically improved as well. Patient improved with steroids, oxygen, and nebulizers. Back to baseline O2 of 3L on 8/31/2021.  - Continue prednisone 40mg daily and plan for taper over ~2 weeks  - Stopped tiotropium and started DuoNebs 4 times daily scheduled--patient has very limited ability to breathe in the medication likely resulting in poor response to the Spiriva so hoping the nebulized duonebs would allow better response  - Continue albuterol nebs every 2 hours as needed  - Z-Johnie to cover for atypical pneumonias or bronchitis  - Avoid BiPap due to history of Pneumothorax  - Hospice consult ordered but not done in hospital, family is  encouraged to meet with hospice as outpatient to discuss      MAREN cavanaugh with RVR  History of atrial flutter, status post ablation:    Patient has history of atrial flutter in 2015 and had ablation.  He had been doing fine until recently when he started to have atrial fibrillation with RVR again. Was started on oral metoprolol during his stay in June. RVR improved in afternoon of 8/30.  - Continue PTA Metoprolol 25 mg twice daily at discharge    Hyponatremia.    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 Na improving to 131 at discharge     7 mm right upper lobe lung nodule found in June 2020:   - Patient can follow up with his PCP for monitoring of this. Could consider repeat imaging in 6-12 months.     Deconditioning    Consultations This Hospital Stay   RESPIRATORY CARE IP CONSULT  SOCIAL WORK IP CONSULT  PHYSICAL THERAPY ADULT IP CONSULT  SMOKING CESSATION PROGRAM IP CONSULT    Code Status   No CPR- Do NOT Intubate    Time Spent on this Encounter   I, Keyshawn Gilbert, personally saw the patient today and spent approximately 35 minutes discharging this patient.       Keyshawn Gilbert MD  [facility]  ______________________________________________________________________    Physical Exam   Vital Signs: Temp: 98.1  F (36.7  C) Temp src: Oral BP: 121/78 Pulse: 92   Resp: 22 SpO2: 94 % O2 Device: Nasal cannula Oxygen Delivery: 3 LPM  Weight: 158 lbs 8.17 oz    Constitutional: Elderly in NAD  HEENT: Eyes nonicteric, oral mucosa moist  Cardiovascular: RRR, normal S1/2, no m/r/g  Respiratory: distant lung sounds, mild basilar crackles, no wheezing appreciated  Vascular: Trace R>L LE pitting edema, noted bilateral chronic venous stasis R > L  GI: Nontender, nondistended  Skin/Integumen: No rashes  Neuro/Psych: Appropriate affect and mood. A&Ox3, moves all extremities       Primary Care Physician   Filemon Fletcher    Discharge Disposition   Discharged to home  Condition at discharge:  Stable    Significant Results and Procedures   Most Recent 3 CBC's:  Recent Labs   Lab Test 08/30/21  0959 06/20/21  1216 06/16/21  0644 06/14/21  0640   WBC 7.4  --  9.2 8.3   HGB 12.3*  --  12.3* 11.8*   MCV 97  --  99 101*    258 238 266     Most Recent 3 BMP's:  Recent Labs   Lab Test 09/01/21  0715 08/31/21  0755 08/30/21  0959   * 128* 127*   POTASSIUM 4.5 4.7 4.5   CHLORIDE 93* 92* 89*   CO2 35* 35* 38*   BUN 19 16 14   CR 0.73 0.64* 0.62*   ANIONGAP 3 1* <1*   SAMIRA 8.9 8.9 8.8   * 130* 106*     Most Recent 2 LFT's:  Recent Labs   Lab Test 06/13/21  0322   AST 16   ALT 18   ALKPHOS 77   BILITOTAL 0.4   ,   Results for orders placed or performed during the hospital encounter of 08/30/21   XR Chest Port 1 View    Narrative    CHEST PORTABLE ONE VIEW   8/30/2021 11:11 AM     HISTORY: Shortness of breath, chronic obstructive pulmonary disease,  history of pneumothorax.    COMPARISON: 6/18/2021.      Impression    IMPRESSION: Trace bibasilar pleural fluid. This is just slightly more  prominent at the right base. No new airspace disease. Normal cardiac  silhouette. No evidence for pneumothorax.    KAMAR TOSCANO MD         SYSTEM ID:  [id number]       Discharge Orders      Home care nursing referral      Reason for your hospital stay    You were hospitalized for a COPD exacerbation.     Follow-up and recommended labs and tests     Follow up with primary care provider, Filemon Fletcher, within 7 days for hospital follow- up.  The following labs/tests are recommended: basic metabolic panel.    - Make an appointment with hospice within a month to learn about hospice and see whether you are interested.  - If you do not have an appointment with a lung specialist, consider making one in about 1-2 months.     Activity    Your activity upon discharge: activity as tolerated     MD face to face encounter    Documentation of Face to Face and Certification for Home Health Services    I certify that patient:  Flavia Luu is under my care and that I, or a nurse practitioner or physician's assistant working with me, had a face-to-face encounter that meets the physician face-to-face encounter requirements with this patient on: 9/1/2021.    This encounter with the patient was in whole, or in part, for the following medical condition, which is the primary reason for home health care: COPD exacerbation.    I certify that, based on my findings, the following services are medically necessary home health services: Nursing.    My clinical findings support the need for the above services because: Nurse is needed: To assess respiratory status after changes in medications or other medical regimen..    Further, I certify that my clinical findings support that this patient is homebound (i.e. absences from home require considerable and taxing effort and are for medical reasons or [employer] services or infrequently or of short duration when for other reasons) because: Leaving home is medically contraindicated for the following reason(s): Dyspnea on exertion that makes it so they cannot leave their home for needed services without clinical deterioration...    Based on the above findings. I certify that this patient is confined to the home and needs intermittent skilled nursing care, physical therapy and/or speech therapy.  The patient is under my care, and I have initiated the establishment of the plan of care.  This patient will be followed by a physician who will periodically review the plan of care.  Physician/Provider to provide follow up care: Filemon Fletcher    Attending hospital physician (the Medicare certified PECOS provider): Keyshawn Gilbert MD  Physician Signature: See electronic signature associated with these discharge orders.  Date: 9/1/2021     Oxygen Adult/Peds    Oxygen Documentation:   I certify that this patient, Flavia Luu has been under my care (or a nurse practitioner or physican's assistant working with  me). This is the face-to-face encounter for oxygen medical necessity.      Flavia Luu is now in a chronic stable state and continues to require supplemental oxygen. Patient has continued oxygen desaturation due to COPD J44.9.    Alternative treatment(s) tried or considered and deemed clinically infective for treatment of COPD J44.9 include nebulizers, inhalers, steroids, and pulmonary rehab.  If portability is ordered, is the patient mobile within the home? yes    **Patients who qualify for home O2 coverage under the CMS guidelines require ABG tests or O2 sat readings obtained closest to, but no earlier than 2 days prior to the discharge, as evidence of the need for home oxygen therapy. Testing must be performed while patient is in the chronic stable state. See notes for O2 sats.**     Diet    Follow this diet upon discharge: Regular Diet Adult     Discharge Medications   Current Discharge Medication List      START taking these medications    Details   azithromycin (ZITHROMAX) 250 MG tablet Take 1 tablet (250 mg) by mouth daily  Qty: 2 tablet, Refills: 0    Associated Diagnoses: COPD exacerbation (H)      benzonatate (TESSALON) 100 MG capsule Take 1 capsule (100 mg) by mouth 3 times daily as needed for cough  Qty: 60 capsule, Refills: 0    Associated Diagnoses: COPD exacerbation (H)      ipratropium - albuterol 0.5 mg/2.5 mg/3 mL (DUONEB) 0.5-2.5 (3) MG/3ML neb solution Take 1 vial (3 mLs) by nebulization 4 times daily  Qty: 360 mL, Refills: 0    Associated Diagnoses: Panlobular emphysema (H)      predniSONE (DELTASONE) 10 MG tablet Take 4 tablets (40 mg) by mouth daily for 2 days, THEN 3 tablets (30 mg) daily for 3 days, THEN 2 tablets (20 mg) daily for 3 days, THEN 1 tablet (10 mg) daily for 3 days.  Qty: 26 tablet, Refills: 0    Associated Diagnoses: COPD exacerbation (H)         CONTINUE these medications which have NOT CHANGED    Details   acetaminophen (TYLENOL) 325 MG tablet Take 2 tablets (650 mg) by  mouth every 4 hours as needed for mild pain  Qty:      Associated Diagnoses: Pneumothorax on right      albuterol (PROAIR HFA) 108 (90 Base) MCG/ACT inhaler Inhale 1-2 puffs into the lungs every 4 hours as needed for shortness of breath / dyspnea  Qty: 1 Inhaler, Refills: 11    Comments: Pharmacy may dispense brand covered by insurance (Proair, or proventil or ventolin or generic albuterol inhaler)  Associated Diagnoses: Panlobular emphysema (H)      albuterol (PROVENTIL) (2.5 MG/3ML) 0.083% neb solution Take 1 vial (2.5 mg) by nebulization every 2 hours as needed for shortness of breath / dyspnea  Qty: 2 Box, Refills: 11    Associated Diagnoses: Panlobular emphysema (H)      ELIQUIS ANTICOAGULANT 5 MG tablet TAKE ONE TABLET BY MOUTH TWICE DAILY FOR AFIB  Qty: 60 tablet, Refills: 11    Comments: review asap  Associated Diagnoses: Atrial fibrillation, unspecified type (H)      fluticasone-salmeterol (ADVAIR) 500-50 MCG/DOSE inhaler Inhale 1 puff into the lungs 2 times daily  Qty: 1 Inhaler, Refills: 11    Associated Diagnoses: Panlobular emphysema (H)      melatonin 1 MG TABS tablet Take 1 tablet (1 mg) by mouth nightly as needed for sleep  Qty:      Associated Diagnoses: Insomnia, unspecified type      metoprolol tartrate (LOPRESSOR) 25 MG tablet Take 0.5 tablets (12.5 mg) by mouth 2 times daily  Qty: 90 tablet, Refills: 1    Comments: PROFILE FOR FUTURE REFILLS  Associated Diagnoses: Atrial fibrillation, unspecified type (H)      roflumilast (DALIRESP) 500 MCG TABS tablet Take 1 tablet (500 mcg) by mouth daily  Qty: 30 tablet, Refills: 2    Comments: New dose. PROFILE FOR FUTURE REFILLS  Associated Diagnoses: Panlobular emphysema (H)         STOP taking these medications       tiotropium (SPIRIVA HANDIHALER) 18 MCG inhaled capsule Comments:   Reason for Stopping:             Allergies   No Known Allergies

## 2021-09-02 ENCOUNTER — PATIENT OUTREACH (OUTPATIENT)
Dept: CARE COORDINATION | Facility: CLINIC | Age: 76
End: 2021-09-02

## 2021-09-02 DIAGNOSIS — Z71.89 OTHER SPECIFIED COUNSELING: ICD-10-CM

## 2021-09-02 NOTE — PROGRESS NOTES
"Clinic Care Coordination Contact  [facility]: Post-Discharge Note  SITUATION                                                      Admission:    Admission Date: 08/30/21   Reason for Admission: COPD exacerbation  Discharge:   Discharge Date: 09/01/21  Discharge Diagnosis: COPD exacerbation    BACKGROUND                                                      Flavia Luu is a 76 year old male with PMH severe oxygen-dependent chronic obstructive pulmonary disease and atrial flutter, status post ablation, who was admitted on 8/30/2021 with a COPD exacerbation.                Chest x-ray does show small bilateral pleural effusions with no sign of infiltrate. VBG showed PCO2 of 79 on admission. VBG on 8/31 shows improving PCO2 to 70, clinically improved as well. Patient improved with steroids, oxygen, and nebulizers. Back to baseline O2 of 3L on 8/31/2021.  - Continue prednisone 40mg daily and plan for taper over ~2 weeks  - Stopped tiotropium and started DuoNebs 4 times daily scheduled--patient has very limited ability to breathe in the medication likely resulting in poor response to the Spiriva so hoping the nebulized duonebs would allow better response  - Continue albuterol nebs every 2 hours as needed  - Z-Johnie to cover for atypical pneumonias or bronchitis  - Avoid BiPap due to history of Pneumothorax  - Hospice consult ordered but not done in hospital, family is encouraged to meet with hospice as outpatient to discuss    ASSESSMENT           Discharge Assessment  How are you doing now that you are home?: \"I am breathing FDC decent right now\"  How are your symptoms? (Red Flag symptoms escalate to triage hotline per guidelines): Improved  Do you feel your condition is stable enough to be safe at home until your provider visit?: Yes  Does the patient have their discharge instructions? : Yes  Does the patient have questions regarding their discharge instructions? : No  Were you started on any new medications or " were there changes to any of your previous medications? : Yes  Does the patient have all of their medications?: Yes  Do you have questions regarding any of your medications? : No  Do you have all of your needed medical supplies or equipment (DME)?  (i.e. oxygen tank, CPAP, cane, etc.): Yes (Pt will talk to PCP about getting a new nebulizer)  Discharge follow-up appointment scheduled within 14 calendar days? : Yes  Discharge Follow Up Appointment Date: 09/08/21  Discharge Follow Up Appointment Scheduled with?: Primary Care Provider         Post-op (Clinicians Only)  Did the patient have surgery or a procedure: No  Fever: No  Chills: No  Eating & Drinking: eating and drinking without complaints/concerns  PO Intake: regular diet  Bowel Function: normal  Urinary Status: voiding without complaint/concerns    PLAN                                                      Outpatient Plan:   Follow up with primary care provider, Filemon Fletcher, within 7 days   for hospital follow- up.  The following labs/tests are recommended: basic   metabolic panel.    - Make an appointment with hospice within a month to learn about hospice   and see whether you are interested.  - If you do not have an appointment with a lung specialist, consider   making one in about 1-2 months.       Future Appointments   Date Time Provider Department Center   9/8/2021 11:40 AM Filemon Fletcher MD [facility]         For any urgent concerns, please contact our 24 hour nurse triage line: [phone] ([phone])         [facility] Farah  [phone]  [facility]

## 2021-09-07 ENCOUNTER — NURSE TRIAGE (OUTPATIENT)
Dept: INTERNAL MEDICINE | Facility: CLINIC | Age: 76
End: 2021-09-07

## 2021-09-07 ENCOUNTER — TELEPHONE (OUTPATIENT)
Dept: INTERNAL MEDICINE | Facility: CLINIC | Age: 76
End: 2021-09-07

## 2021-09-07 NOTE — TELEPHONE ENCOUNTER
Patient's daughter called concerned with patient's urinary retention.  Patient is also concerned with patient's SOB however, this is his baseline.  Patient has bee drinking about 1-1.25 liters of fluid daily but urinary output is 400-500 ccs of concentrated urine daily.  No blood in urine, afebrile.no pain.Patient does have an appointment with PCP tomorrow. Had originally wanted to change to virtual but encouraged and agreed to come into the clinic for assessment.    Saima Devine, MSN, RN   [facility] Triage      Reason for Disposition    Patient wants to be seen    Additional Information    Negative: Shock suspected (e.g., cold/pale/clammy skin, too weak to stand, low BP, rapid pulse)    Negative: Sounds like a life-threatening emergency to the triager    Negative: Followed a genital area injury    Negative: Followed a genital area injury (penis, scrotum)    Negative: Vaginal discharge    Negative: Pus (white, yellow) or bloody discharge from end of penis    Negative: Discomfort (pain, burning or stinging) when passing urine and pregnant    Negative: Discomfort (pain, burning or stinging) when passing urine and female    Negative: Discomfort (pain, burning or stinging) when passing urine and male    Negative: Pain or itching in the vulvar area    Negative: Pain in scrotum is main symptom    Negative: Blood in the urine is main symptom    Negative: Symptoms arising from use of a urinary catheter (Clements or Coude)    Negative: Unable to urinate (or only a few drops) > 4 hours and bladder feels very full (e.g., palpable bladder or strong urge to urinate)    Negative: Decreased urination and drinking very little and dehydration suspected (e.g., dark urine, no urine > 12 hours, very dry mouth, very lightheaded)    Negative: Patient sounds very sick or weak to the triager    Negative: Fever > 100.4 F (38.0 C)    Negative: Urinating more frequently than usual (i.e., frequency)    Negative: Can't control passage of  "urine (i.e., urinary incontinence) and new onset (< 2 weeks) or worsening    Negative: Side (flank) or lower back pain present    Negative: Bad or foul-smelling urine    Answer Assessment - Initial Assessment Questions  1. SYMPTOM: \"What's the main symptom you're concerned about?\" (e.g., frequency, incontinence)    Urinary Retention        2. ONSET: \"When did the  Retention   Start?\"        48 hours ago    3. PAIN: \"Is there any pain?\" If so, ask: \"How bad is it?\" (Scale: 1-10; mild, moderate, severe)        No pain with urination or in bladder or kidney/flank area    4. CAUSE: \"What do you think is causing the symptoms?\"    Inability to urinate        5. OTHER SYMPTOMS: \"Do you have any other symptoms?\" (e.g., fever, flank pain, blood in urine, pain with urination)        Afebrile, no blood in urine,     6. PREGNANCY: \"Is there any chance you are pregnant?\" \"When was your last menstrual period?\"      NA    Protocols used: URINARY SYMPTOMS-A-OH      "

## 2021-09-08 ENCOUNTER — HOSPITAL ENCOUNTER (INPATIENT)
Facility: CLINIC | Age: 76
LOS: 1 days | Discharge: HOSPICE | DRG: 191 | End: 2021-09-09
Attending: EMERGENCY MEDICINE | Admitting: STUDENT IN AN ORGANIZED HEALTH CARE EDUCATION/TRAINING PROGRAM
Payer: COMMERCIAL

## 2021-09-08 ENCOUNTER — APPOINTMENT (OUTPATIENT)
Dept: GENERAL RADIOLOGY | Facility: CLINIC | Age: 76
DRG: 191 | End: 2021-09-08
Attending: EMERGENCY MEDICINE
Payer: COMMERCIAL

## 2021-09-08 DIAGNOSIS — J44.1 COPD EXACERBATION (H): ICD-10-CM

## 2021-09-08 DIAGNOSIS — J96.22 ACUTE AND CHRONIC RESPIRATORY FAILURE WITH HYPERCAPNIA (H): ICD-10-CM

## 2021-09-08 DIAGNOSIS — J96.21 ACUTE AND CHRONIC RESPIRATORY FAILURE WITH HYPOXIA (H): ICD-10-CM

## 2021-09-08 DIAGNOSIS — Z51.5 END OF LIFE CARE: Primary | ICD-10-CM

## 2021-09-08 LAB
ANION GAP SERPL CALCULATED.3IONS-SCNC: <1 MMOL/L (ref 3–14)
ATRIAL RATE - MUSE: 83 BPM
BASOPHILS # BLD AUTO: 0 10E3/UL (ref 0–0.2)
BASOPHILS NFR BLD AUTO: 0 %
BUN SERPL-MCNC: 18 MG/DL (ref 7–30)
CALCIUM SERPL-MCNC: 8.6 MG/DL (ref 8.5–10.1)
CHLORIDE BLD-SCNC: 89 MMOL/L (ref 94–109)
CO2 SERPL-SCNC: 36 MMOL/L (ref 20–32)
CREAT SERPL-MCNC: 0.68 MG/DL (ref 0.66–1.25)
DIASTOLIC BLOOD PRESSURE - MUSE: NORMAL MMHG
EOSINOPHIL # BLD AUTO: 0 10E3/UL (ref 0–0.7)
EOSINOPHIL NFR BLD AUTO: 0 %
ERYTHROCYTE [DISTWIDTH] IN BLOOD BY AUTOMATED COUNT: 12.5 % (ref 10–15)
GFR SERPL CREATININE-BSD FRML MDRD: >90 ML/MIN/1.73M2
GLUCOSE BLD-MCNC: 114 MG/DL (ref 70–99)
HCO3 BLDV-SCNC: 38 MMOL/L (ref 21–28)
HCO3 BLDV-SCNC: 40 MMOL/L (ref 21–28)
HCT VFR BLD AUTO: 40.5 % (ref 40–53)
HGB BLD-MCNC: 13.1 G/DL (ref 13.3–17.7)
HOLD SPECIMEN: NORMAL
IMM GRANULOCYTES # BLD: 0.1 10E3/UL
IMM GRANULOCYTES NFR BLD: 1 %
INTERPRETATION ECG - MUSE: NORMAL
LACTATE BLD-SCNC: 0.6 MMOL/L
LACTATE BLD-SCNC: 0.6 MMOL/L
LYMPHOCYTES # BLD AUTO: 0.5 10E3/UL (ref 0.8–5.3)
LYMPHOCYTES NFR BLD AUTO: 7 %
MCH RBC QN AUTO: 30.7 PG (ref 26.5–33)
MCHC RBC AUTO-ENTMCNC: 32.3 G/DL (ref 31.5–36.5)
MCV RBC AUTO: 95 FL (ref 78–100)
MONOCYTES # BLD AUTO: 0.9 10E3/UL (ref 0–1.3)
MONOCYTES NFR BLD AUTO: 12 %
NEUTROPHILS # BLD AUTO: 5.8 10E3/UL (ref 1.6–8.3)
NEUTROPHILS NFR BLD AUTO: 80 %
NRBC # BLD AUTO: 0 10E3/UL
NRBC BLD AUTO-RTO: 0 /100
NT-PROBNP SERPL-MCNC: 600 PG/ML (ref 0–1800)
P AXIS - MUSE: 89 DEGREES
PCO2 BLDV: 75 MM HG (ref 40–50)
PCO2 BLDV: 78 MM HG (ref 40–50)
PH BLDV: 7.3 [PH] (ref 7.32–7.43)
PH BLDV: 7.33 [PH] (ref 7.32–7.43)
PLATELET # BLD AUTO: 273 10E3/UL (ref 150–450)
PO2 BLDV: 31 MM HG (ref 25–47)
PO2 BLDV: 58 MM HG (ref 25–47)
POTASSIUM BLD-SCNC: 4.5 MMOL/L (ref 3.4–5.3)
PR INTERVAL - MUSE: 154 MS
QRS DURATION - MUSE: 92 MS
QT - MUSE: 352 MS
QTC - MUSE: 413 MS
R AXIS - MUSE: 70 DEGREES
RBC # BLD AUTO: 4.27 10E6/UL (ref 4.4–5.9)
SAO2 % BLDV: 50 % (ref 94–100)
SAO2 % BLDV: 86 % (ref 94–100)
SARS-COV-2 RNA RESP QL NAA+PROBE: NEGATIVE
SODIUM SERPL-SCNC: 124 MMOL/L (ref 133–144)
SYSTOLIC BLOOD PRESSURE - MUSE: NORMAL MMHG
T AXIS - MUSE: 72 DEGREES
TROPONIN I SERPL-MCNC: <0.015 UG/L (ref 0–0.04)
VENTRICULAR RATE- MUSE: 83 BPM
WBC # BLD AUTO: 7.3 10E3/UL (ref 4–11)

## 2021-09-08 PROCEDURE — 85025 COMPLETE CBC W/AUTO DIFF WBC: CPT | Performed by: EMERGENCY MEDICINE

## 2021-09-08 PROCEDURE — 87635 SARS-COV-2 COVID-19 AMP PRB: CPT | Performed by: EMERGENCY MEDICINE

## 2021-09-08 PROCEDURE — 99291 CRITICAL CARE FIRST HOUR: CPT | Performed by: STUDENT IN AN ORGANIZED HEALTH CARE EDUCATION/TRAINING PROGRAM

## 2021-09-08 PROCEDURE — 83880 ASSAY OF NATRIURETIC PEPTIDE: CPT | Performed by: EMERGENCY MEDICINE

## 2021-09-08 PROCEDURE — 93005 ELECTROCARDIOGRAM TRACING: CPT

## 2021-09-08 PROCEDURE — 71045 X-RAY EXAM CHEST 1 VIEW: CPT

## 2021-09-08 PROCEDURE — 93308 TTE F-UP OR LMTD: CPT

## 2021-09-08 PROCEDURE — 250N000011 HC RX IP 250 OP 636: Performed by: EMERGENCY MEDICINE

## 2021-09-08 PROCEDURE — 96376 TX/PRO/DX INJ SAME DRUG ADON: CPT

## 2021-09-08 PROCEDURE — 5A09357 ASSISTANCE WITH RESPIRATORY VENTILATION, LESS THAN 24 CONSECUTIVE HOURS, CONTINUOUS POSITIVE AIRWAY PRESSURE: ICD-10-PCS | Performed by: STUDENT IN AN ORGANIZED HEALTH CARE EDUCATION/TRAINING PROGRAM

## 2021-09-08 PROCEDURE — 36415 COLL VENOUS BLD VENIPUNCTURE: CPT | Performed by: EMERGENCY MEDICINE

## 2021-09-08 PROCEDURE — 250N000009 HC RX 250

## 2021-09-08 PROCEDURE — 99291 CRITICAL CARE FIRST HOUR: CPT | Mod: 25

## 2021-09-08 PROCEDURE — 82803 BLOOD GASES ANY COMBINATION: CPT

## 2021-09-08 PROCEDURE — 94640 AIRWAY INHALATION TREATMENT: CPT

## 2021-09-08 PROCEDURE — 96367 TX/PROPH/DG ADDL SEQ IV INF: CPT

## 2021-09-08 PROCEDURE — 999N000157 HC STATISTIC RCP TIME EA 10 MIN

## 2021-09-08 PROCEDURE — 120N000001 HC R&B MED SURG/OB

## 2021-09-08 PROCEDURE — 250N000013 HC RX MED GY IP 250 OP 250 PS 637: Performed by: STUDENT IN AN ORGANIZED HEALTH CARE EDUCATION/TRAINING PROGRAM

## 2021-09-08 PROCEDURE — C9803 HOPD COVID-19 SPEC COLLECT: HCPCS

## 2021-09-08 PROCEDURE — 250N000011 HC RX IP 250 OP 636: Performed by: STUDENT IN AN ORGANIZED HEALTH CARE EDUCATION/TRAINING PROGRAM

## 2021-09-08 PROCEDURE — 250N000009 HC RX 250: Performed by: STUDENT IN AN ORGANIZED HEALTH CARE EDUCATION/TRAINING PROGRAM

## 2021-09-08 PROCEDURE — 82374 ASSAY BLOOD CARBON DIOXIDE: CPT | Performed by: EMERGENCY MEDICINE

## 2021-09-08 PROCEDURE — 84484 ASSAY OF TROPONIN QUANT: CPT | Performed by: EMERGENCY MEDICINE

## 2021-09-08 PROCEDURE — 96375 TX/PRO/DX INJ NEW DRUG ADDON: CPT

## 2021-09-08 PROCEDURE — 96365 THER/PROPH/DIAG IV INF INIT: CPT

## 2021-09-08 RX ORDER — METHYLPREDNISOLONE SODIUM SUCCINATE 125 MG/2ML
125 INJECTION, POWDER, LYOPHILIZED, FOR SOLUTION INTRAMUSCULAR; INTRAVENOUS ONCE
Status: COMPLETED | OUTPATIENT
Start: 2021-09-08 | End: 2021-09-08

## 2021-09-08 RX ORDER — PROCHLORPERAZINE 25 MG
12.5 SUPPOSITORY, RECTAL RECTAL EVERY 12 HOURS PRN
Status: DISCONTINUED | OUTPATIENT
Start: 2021-09-08 | End: 2021-09-09 | Stop reason: HOSPADM

## 2021-09-08 RX ORDER — AMOXICILLIN 250 MG
2 CAPSULE ORAL 2 TIMES DAILY PRN
Status: DISCONTINUED | OUTPATIENT
Start: 2021-09-08 | End: 2021-09-09 | Stop reason: HOSPADM

## 2021-09-08 RX ORDER — MORPHINE SULFATE 20 MG/ML
5-10 SOLUTION ORAL
Status: DISCONTINUED | OUTPATIENT
Start: 2021-09-08 | End: 2021-09-09 | Stop reason: HOSPADM

## 2021-09-08 RX ORDER — IPRATROPIUM BROMIDE AND ALBUTEROL SULFATE 2.5; .5 MG/3ML; MG/3ML
6 SOLUTION RESPIRATORY (INHALATION) ONCE
Status: COMPLETED | OUTPATIENT
Start: 2021-09-08 | End: 2021-09-08

## 2021-09-08 RX ORDER — LORAZEPAM 2 MG/ML
1 INJECTION INTRAMUSCULAR
Status: DISCONTINUED | OUTPATIENT
Start: 2021-09-08 | End: 2021-09-09 | Stop reason: HOSPADM

## 2021-09-08 RX ORDER — IPRATROPIUM BROMIDE AND ALBUTEROL SULFATE 2.5; .5 MG/3ML; MG/3ML
3 SOLUTION RESPIRATORY (INHALATION) EVERY 4 HOURS
Status: DISCONTINUED | OUTPATIENT
Start: 2021-09-08 | End: 2021-09-09 | Stop reason: HOSPADM

## 2021-09-08 RX ORDER — ONDANSETRON 4 MG/1
4 TABLET, ORALLY DISINTEGRATING ORAL EVERY 6 HOURS PRN
Status: DISCONTINUED | OUTPATIENT
Start: 2021-09-08 | End: 2021-09-08

## 2021-09-08 RX ORDER — ONDANSETRON 4 MG/1
4 TABLET, ORALLY DISINTEGRATING ORAL EVERY 6 HOURS PRN
Status: DISCONTINUED | OUTPATIENT
Start: 2021-09-08 | End: 2021-09-09 | Stop reason: HOSPADM

## 2021-09-08 RX ORDER — MORPHINE SULFATE 2 MG/ML
1-2 INJECTION, SOLUTION INTRAMUSCULAR; INTRAVENOUS
Status: DISCONTINUED | OUTPATIENT
Start: 2021-09-08 | End: 2021-09-09 | Stop reason: HOSPADM

## 2021-09-08 RX ORDER — POLYETHYLENE GLYCOL 3350 17 G/17G
17 POWDER, FOR SOLUTION ORAL DAILY PRN
Status: DISCONTINUED | OUTPATIENT
Start: 2021-09-08 | End: 2021-09-09 | Stop reason: HOSPADM

## 2021-09-08 RX ORDER — IPRATROPIUM BROMIDE AND ALBUTEROL SULFATE 2.5; .5 MG/3ML; MG/3ML
SOLUTION RESPIRATORY (INHALATION)
Status: COMPLETED
Start: 2021-09-08 | End: 2021-09-08

## 2021-09-08 RX ORDER — PROCHLORPERAZINE MALEATE 5 MG
5 TABLET ORAL EVERY 6 HOURS PRN
Status: DISCONTINUED | OUTPATIENT
Start: 2021-09-08 | End: 2021-09-09 | Stop reason: HOSPADM

## 2021-09-08 RX ORDER — ONDANSETRON 2 MG/ML
4 INJECTION INTRAMUSCULAR; INTRAVENOUS EVERY 6 HOURS PRN
Status: DISCONTINUED | OUTPATIENT
Start: 2021-09-08 | End: 2021-09-09 | Stop reason: HOSPADM

## 2021-09-08 RX ORDER — NALOXONE HYDROCHLORIDE 0.4 MG/ML
0.1 INJECTION, SOLUTION INTRAMUSCULAR; INTRAVENOUS; SUBCUTANEOUS
Status: DISCONTINUED | OUTPATIENT
Start: 2021-09-08 | End: 2021-09-09 | Stop reason: HOSPADM

## 2021-09-08 RX ORDER — NALOXONE HYDROCHLORIDE 0.4 MG/ML
0.2 INJECTION, SOLUTION INTRAMUSCULAR; INTRAVENOUS; SUBCUTANEOUS
Status: DISCONTINUED | OUTPATIENT
Start: 2021-09-08 | End: 2021-09-09 | Stop reason: HOSPADM

## 2021-09-08 RX ORDER — AZITHROMYCIN 250 MG/1
250 TABLET, FILM COATED ORAL DAILY
Status: CANCELLED | OUTPATIENT
Start: 2021-09-09 | End: 2021-09-13

## 2021-09-08 RX ORDER — AMOXICILLIN 250 MG
1 CAPSULE ORAL 2 TIMES DAILY PRN
Status: DISCONTINUED | OUTPATIENT
Start: 2021-09-08 | End: 2021-09-09 | Stop reason: HOSPADM

## 2021-09-08 RX ORDER — AZITHROMYCIN 500 MG/1
500 INJECTION, POWDER, LYOPHILIZED, FOR SOLUTION INTRAVENOUS ONCE
Status: COMPLETED | OUTPATIENT
Start: 2021-09-08 | End: 2021-09-08

## 2021-09-08 RX ORDER — ONDANSETRON 2 MG/ML
4 INJECTION INTRAMUSCULAR; INTRAVENOUS EVERY 6 HOURS PRN
Status: DISCONTINUED | OUTPATIENT
Start: 2021-09-08 | End: 2021-09-08

## 2021-09-08 RX ORDER — ACETAMINOPHEN 650 MG/1
650 SUPPOSITORY RECTAL EVERY 6 HOURS PRN
Status: DISCONTINUED | OUTPATIENT
Start: 2021-09-08 | End: 2021-09-09 | Stop reason: HOSPADM

## 2021-09-08 RX ORDER — LORAZEPAM 1 MG/1
1 TABLET ORAL
Status: DISCONTINUED | OUTPATIENT
Start: 2021-09-08 | End: 2021-09-09 | Stop reason: HOSPADM

## 2021-09-08 RX ORDER — ROFLUMILAST 500 UG/1
500 TABLET ORAL DAILY
Status: DISCONTINUED | OUTPATIENT
Start: 2021-09-09 | End: 2021-09-09 | Stop reason: HOSPADM

## 2021-09-08 RX ORDER — LIDOCAINE 40 MG/G
CREAM TOPICAL
Status: DISCONTINUED | OUTPATIENT
Start: 2021-09-08 | End: 2021-09-09 | Stop reason: HOSPADM

## 2021-09-08 RX ORDER — MORPHINE SULFATE 10 MG/5ML
5-10 SOLUTION ORAL
Status: DISCONTINUED | OUTPATIENT
Start: 2021-09-08 | End: 2021-09-09 | Stop reason: HOSPADM

## 2021-09-08 RX ORDER — LORAZEPAM 2 MG/ML
0.5 INJECTION INTRAMUSCULAR
Status: COMPLETED | OUTPATIENT
Start: 2021-09-08 | End: 2021-09-08

## 2021-09-08 RX ORDER — CARBOXYMETHYLCELLULOSE SODIUM 5 MG/ML
1 SOLUTION/ DROPS OPHTHALMIC
Status: DISCONTINUED | OUTPATIENT
Start: 2021-09-08 | End: 2021-09-09 | Stop reason: HOSPADM

## 2021-09-08 RX ORDER — BENZONATATE 100 MG/1
100 CAPSULE ORAL 3 TIMES DAILY PRN
Status: DISCONTINUED | OUTPATIENT
Start: 2021-09-08 | End: 2021-09-09 | Stop reason: HOSPADM

## 2021-09-08 RX ORDER — LIDOCAINE 40 MG/G
CREAM TOPICAL
Status: DISCONTINUED | OUTPATIENT
Start: 2021-09-08 | End: 2021-09-08

## 2021-09-08 RX ORDER — PROCHLORPERAZINE MALEATE 5 MG
5 TABLET ORAL EVERY 6 HOURS PRN
Status: DISCONTINUED | OUTPATIENT
Start: 2021-09-08 | End: 2021-09-08

## 2021-09-08 RX ORDER — CEFTRIAXONE 1 G/1
1 INJECTION, POWDER, FOR SOLUTION INTRAMUSCULAR; INTRAVENOUS EVERY 24 HOURS
Status: CANCELLED | OUTPATIENT
Start: 2021-09-08

## 2021-09-08 RX ORDER — PROCHLORPERAZINE 25 MG
12.5 SUPPOSITORY, RECTAL RECTAL EVERY 12 HOURS PRN
Status: DISCONTINUED | OUTPATIENT
Start: 2021-09-08 | End: 2021-09-08

## 2021-09-08 RX ORDER — ALBUTEROL SULFATE 0.83 MG/ML
2.5 SOLUTION RESPIRATORY (INHALATION)
Status: DISCONTINUED | OUTPATIENT
Start: 2021-09-08 | End: 2021-09-09 | Stop reason: HOSPADM

## 2021-09-08 RX ORDER — SALIVA STIMULANT COMB. NO.3
2 SPRAY, NON-AEROSOL (ML) MUCOUS MEMBRANE
Status: DISCONTINUED | OUTPATIENT
Start: 2021-09-08 | End: 2021-09-09 | Stop reason: HOSPADM

## 2021-09-08 RX ORDER — ACETAMINOPHEN 325 MG/1
650 TABLET ORAL EVERY 6 HOURS PRN
Status: DISCONTINUED | OUTPATIENT
Start: 2021-09-08 | End: 2021-09-09 | Stop reason: HOSPADM

## 2021-09-08 RX ORDER — METHYLPREDNISOLONE SODIUM SUCCINATE 40 MG/ML
40 INJECTION, POWDER, LYOPHILIZED, FOR SOLUTION INTRAMUSCULAR; INTRAVENOUS DAILY
Status: DISCONTINUED | OUTPATIENT
Start: 2021-09-09 | End: 2021-09-09

## 2021-09-08 RX ORDER — CEFTRIAXONE 2 G/1
2 INJECTION, POWDER, FOR SOLUTION INTRAMUSCULAR; INTRAVENOUS ONCE
Status: COMPLETED | OUTPATIENT
Start: 2021-09-08 | End: 2021-09-08

## 2021-09-08 RX ORDER — NITROGLYCERIN 0.4 MG/1
0.4 TABLET SUBLINGUAL EVERY 5 MIN PRN
Status: DISCONTINUED | OUTPATIENT
Start: 2021-09-08 | End: 2021-09-09 | Stop reason: HOSPADM

## 2021-09-08 RX ORDER — ECHINACEA PURPUREA EXTRACT 125 MG
1 TABLET ORAL
Status: DISCONTINUED | OUTPATIENT
Start: 2021-09-08 | End: 2021-09-09 | Stop reason: HOSPADM

## 2021-09-08 RX ADMIN — AZITHROMYCIN MONOHYDRATE 500 MG: 500 INJECTION, POWDER, LYOPHILIZED, FOR SOLUTION INTRAVENOUS at 11:11

## 2021-09-08 RX ADMIN — IPRATROPIUM BROMIDE AND ALBUTEROL SULFATE 6 ML: 2.5; .5 SOLUTION RESPIRATORY (INHALATION) at 10:21

## 2021-09-08 RX ADMIN — IPRATROPIUM BROMIDE AND ALBUTEROL SULFATE 6 ML: .5; 3 SOLUTION RESPIRATORY (INHALATION) at 10:21

## 2021-09-08 RX ADMIN — MORPHINE SULFATE 2 MG: 2 INJECTION, SOLUTION INTRAMUSCULAR; INTRAVENOUS at 16:21

## 2021-09-08 RX ADMIN — LORAZEPAM 1 MG: 2 INJECTION INTRAMUSCULAR; INTRAVENOUS at 12:14

## 2021-09-08 RX ADMIN — METOPROLOL TARTRATE 12.5 MG: 25 TABLET, FILM COATED ORAL at 22:27

## 2021-09-08 RX ADMIN — LORAZEPAM 0.5 MG: 2 INJECTION INTRAMUSCULAR; INTRAVENOUS at 10:22

## 2021-09-08 RX ADMIN — Medication 1 MG: at 22:27

## 2021-09-08 RX ADMIN — ALBUTEROL SULFATE 2.5 MG: 2.5 SOLUTION RESPIRATORY (INHALATION) at 23:07

## 2021-09-08 RX ADMIN — MORPHINE SULFATE 2 MG: 2 INJECTION, SOLUTION INTRAMUSCULAR; INTRAVENOUS at 12:14

## 2021-09-08 RX ADMIN — CEFTRIAXONE SODIUM 2 G: 2 INJECTION, POWDER, FOR SOLUTION INTRAMUSCULAR; INTRAVENOUS at 10:42

## 2021-09-08 RX ADMIN — LORAZEPAM 1 MG: 0.5 TABLET ORAL at 20:28

## 2021-09-08 RX ADMIN — METHYLPREDNISOLONE SODIUM SUCCINATE 125 MG: 125 INJECTION, POWDER, FOR SOLUTION INTRAMUSCULAR; INTRAVENOUS at 10:12

## 2021-09-08 RX ADMIN — ACETAMINOPHEN 650 MG: 325 TABLET, FILM COATED ORAL at 22:27

## 2021-09-08 ASSESSMENT — ENCOUNTER SYMPTOMS
FEVER: 0
COUGH: 1
SHORTNESS OF BREATH: 1

## 2021-09-08 NOTE — ED NOTES
BiPap removed off patient, pt medicated per MAR for anxiety and to keep him comfortable. Patient switched to 4L oxygen via nasal cannula.

## 2021-09-08 NOTE — H&P
[facility]    History and Physical - Hospitalist Service       Date of Admission:  9/8/2021    Assessment & Plan      Flavia Luu is a 76 year old male admitted on 9/8/2021. He presents with worsening SOB.    Recurrent COPD exacerbation  Severe, end-stage COPD, oxygen dependent  Comfort cares  Patient with progressively worsening shortness of breath over the last several weeks.  Recent admission for COPD exacerbation.  Since that time he has not had any improvement and actually has worsened with his breathing.  Family presented to the hospital 9/8/21 due to intolerable symptoms.  They are in touch with [facility] hospice.  Unfortunately hospice is unable to enroll patient today.  We will admit the patient for plan for comfort cares.  We will plan to discharge home with hospice as soon as it is set up.  -Admit to inpatient  -Comfort care order set  -Continue necessary COPD meds for comfort  -Morphine p.o. and IV  -Oxygen as needed for comfort, does use 2 to 3 L via nasal cannula at baseline  -No vitals checks  -Eat for comfort  -Social work consult for hospice set up    Urinary retention, BPH  -Offered patient Clements catheter, he declines at this point.    Other problems noted on admission that we are not treating due to comfort cares:  Hyponatremia, likely due to SIADH in the setting of severe lung disease  Chronic hypercarbic respiratory failure due to COPD  Atrial fibrillation  Pulmonary nodules  Deconditioning       Diet:  Regular, eat for comfort  DVT Prophylaxis: Comfort care  Clements Catheter: Not present  Central Lines: None  Code Status:  DNR/DNI, patient is comfort care, discussed this on admission.      Clinically Significant Risk Factors Present on Admission         # Hyponatremia: Na = 124 mmol/L (Ref range: 133 - 144 mmol/L) on admission, will monitor as appropriate     # Coagulation Defect: home medication list includes an anticoagulant medication       Disposition  Plan   Expected discharge: 1-2 days recommended to prior living arrangement once Hospice care is set up and available to take patient.     The patient's care was discussed with the Bedside Nurse, Patient, Patient's Family and ED MD Team.    Bob Taylor MD  [facility]  Securely message with the Vocera Web Console (learn more here)  Text page via [employer] Paging/Directory    Time Spent on this Encounter   Flavia was seen and evaluated by me on 09/08/21.  He was in critical condition as the result of acute on chronic hypoxic and hypercarbic respiratory failure due to severe, end-stage COPD..    His condition is now Terminal.      The acute issues managed by me today include goals of care in the setting of severe, end-stage COPD.  Supportive interventions provided and/or ordered by me include decision to transition to comfort care, comfort care order set, IV medications for management of respiratory distress.    Total Critical Care time spent by me, excluding procedures, was 60 minutes.    Bob Taylor MD        ______________________________________________________________________    Chief Complaint   Shortness of breath    History is obtained from the patient, electronic health record and emergency department physician, and patient's daughter and wife.    History of Present Illness   Flavia Luu is a 76 year old male who has a history of severe, end-stage COPD on chronic oxygen support.  He was recently admitted to the hospital for COPD exacerbation.  After discharge 7 days prior to presentation, he has had no improvement in his symptoms.  He notes that his symptoms have actually progressively worsened.  He notes ongoing worsening shortness of breath, dry cough, and fatigue.  He and his family have been in touch with the hospice agency but has not formally enrolled at this point.  They were going to see if they could enroll today, 9/8/2021, unfortunately, they were unable to so  decided to present to the hospital.    In the emergency department he was under the care of Dr. Bailey, case was discussed with him.  Initially, there was some confusion about the patient's wishes and so he was started on BiPAP and given several nebulizers with some a mild symptomatic improvement.  Initially patient stated that he would be DNR/DNI, but would accept a chest tube if needed for pneumothorax, he just wanted to feel better with his breathing.  Decision at that point was made to place on BiPAP.  After further discussion with family arrived, it became clear that he plan to enroll in hospice and the decision was made to transition to comfort care while in the emergency department.  I was the provider who had this discussion with the family.    Review of Systems    The 10 point Review of Systems is negative other than noted in the HPI or here.     Past Medical History    I have reviewed this patient's medical history and updated it with pertinent information if needed.   Past Medical History:   Diagnosis Date     Atrial flutter (H)     sp ablation     COPD (chronic obstructive pulmonary disease) (H)      Emphysema of lung (H)      PVC (premature ventricular contraction)     LVOT, moderately frequent     Tobacco use disorder        Past Surgical History   I have reviewed this patient's surgical history and updated it with pertinent information if needed.  Past Surgical History:   Procedure Laterality Date     CATARACT IOL, RT/LT      bilat     EYE SURGERY       H ABLATION ATRIAL FLUTTER  2013          ZZC NONSPECIFIC PROCEDURE      vv stripping on left       Social History   I have reviewed this patient's social history and updated it with pertinent information if needed.  Social History     Tobacco Use     Smoking status: Former Smoker     Packs/day: 1.00     Years: 43.00     Pack years: 43.00     Types: Cigarettes     Quit date: 2008     Years since quittin.7     Smokeless tobacco:  Never Used   Substance Use Topics     Alcohol use: Yes     Alcohol/week: 0.0 standard drinks     Comment: very rare     Drug use: No       Family History   I have reviewed this patient's family history and updated it with pertinent information if needed.  Family History   Problem Relation Age of Onset     Cancer Mother      Diabetes Father        Prior to Admission Medications   Prior to Admission Medications   Prescriptions Last Dose Informant Patient Reported? Taking?   ELIQUIS ANTICOAGULANT 5 MG tablet  Self No No   Sig: TAKE ONE TABLET BY MOUTH TWICE DAILY FOR AFIB   acetaminophen (TYLENOL) 325 MG tablet  Self No No   Sig: Take 2 tablets (650 mg) by mouth every 4 hours as needed for mild pain   albuterol (PROAIR HFA) 108 (90 Base) MCG/ACT inhaler  Self No No   Sig: Inhale 1-2 puffs into the lungs every 4 hours as needed for shortness of breath / dyspnea   albuterol (PROVENTIL) (2.5 MG/3ML) 0.083% neb solution  Self No No   Sig: Take 1 vial (2.5 mg) by nebulization every 2 hours as needed for shortness of breath / dyspnea   azithromycin (ZITHROMAX) 250 MG tablet   No No   Sig: Take 1 tablet (250 mg) by mouth daily   benzonatate (TESSALON) 100 MG capsule   No No   Sig: Take 1 capsule (100 mg) by mouth 3 times daily as needed for cough   fluticasone-salmeterol (ADVAIR) 500-50 MCG/DOSE inhaler  Self No No   Sig: Inhale 1 puff into the lungs 2 times daily   ipratropium - albuterol 0.5 mg/2.5 mg/3 mL (DUONEB) 0.5-2.5 (3) MG/3ML neb solution   No No   Sig: Take 1 vial (3 mLs) by nebulization 4 times daily   melatonin 1 MG TABS tablet  Self No No   Sig: Take 1 tablet (1 mg) by mouth nightly as needed for sleep   metoprolol tartrate (LOPRESSOR) 25 MG tablet  Self No No   Sig: Take 0.5 tablets (12.5 mg) by mouth 2 times daily   predniSONE (DELTASONE) 10 MG tablet   No No   Sig: Take 4 tablets (40 mg) by mouth daily for 2 days, THEN 3 tablets (30 mg) daily for 3 days, THEN 2 tablets (20 mg) daily for 3 days, THEN 1 tablet  (10 mg) daily for 3 days.   roflumilast (DALIRESP) 500 MCG TABS tablet  Self No No   Sig: Take 1 tablet (500 mcg) by mouth daily      Facility-Administered Medications: None     Allergies   No Known Allergies    Physical Exam   Vital Signs: Temp: 98.2  F (36.8  C) Temp src: Temporal BP: 103/81 Pulse: 76   Resp: (!) 36 SpO2: 99 % O2 Device: BiPAP/CPAP    Weight: 0 lbs 0 oz    Constitutional: Awake, alert, cooperative.  In mild respiratory distress on BiPAP support of 10/5  Respiratory: Increased work of breathing, diffuse wheezes in the central area.  Poor air movement throughout.  Cardiovascular: Rate controlled atrial fibrillation, normal S1 and S2, and no murmur noted  GI: Normal bowel sounds, soft, non-distended, non-tender  Skin/Integumen: Diffuse scattered bruises and skin consistent with thin skin.  And old age.  Other:       Data   Data reviewed today: I reviewed all medications, new labs and imaging results over the last 24 hours. I personally reviewed the EKG tracing showing afib and the chest x-ray image(s) showing hyperinflated lungs. no clear infiltrate.    Recent Labs   Lab 09/08/21  1017   WBC 7.3   HGB 13.1*   MCV 95      *   POTASSIUM 4.5   CHLORIDE 89*   CO2 36*   BUN 18   CR 0.68   ANIONGAP <1*   SAMIRA 8.6   *   TROPONIN <0.015     Most Recent 3 CBC's:Recent Labs   Lab Test 09/08/21  1017 08/30/21  0959 06/20/21  1216 06/16/21  0644   WBC 7.3 7.4  --  9.2   HGB 13.1* 12.3*  --  12.3*   MCV 95 97  --  99    331 258 238     Most Recent 3 BMP's:Recent Labs   Lab Test 09/08/21  1017 09/01/21  0715 08/31/21  0755   * 131* 128*   POTASSIUM 4.5 4.5 4.7   CHLORIDE 89* 93* 92*   CO2 36* 35* 35*   BUN 18 19 16   CR 0.68 0.73 0.64*   ANIONGAP <1* 3 1*   SAMIRA 8.6 8.9 8.9   * 126* 130*     Recent Results (from the past 24 hour(s))   XR Chest Port 1 View    Narrative    CHEST ONE VIEW PORTABLE   9/8/2021 10:24 AM     HISTORY: Dyspnea.    COMPARISON: Chest x-ray 8/30/2021.       Impression    IMPRESSION: Portable chest. Lungs are hyperinflated with scattered  chronic-appearing interstitial changes likely related to COPD. No new  infiltrate or lung consolidation. Heart is normal in size. No  pneumothorax. Previously described trace pleural fluid has resolved.    GARETT POSADAS MD         SYSTEM ID:  [id number]

## 2021-09-08 NOTE — PROGRESS NOTES
"SPIRITUAL HEALTH SERVICES Progress Note  [facility] ED    Brief visit with pt, his wife Linda and daughter Anay, per staff request.  Pt and family declined offer of  support at this time.  Pt's wife said \"They are moving him to another room here in the ED and then upstairs.  Our plan is for him to discharge with hospice in a couple of days.\"  I offered the availability of chaplains later today and in the days ahead, should any needs arise.                                                                                                                                                 Chanell Balderrama M.A.  Staff   Phone [phone]      "

## 2021-09-08 NOTE — ED NOTES
[facility]  ED Nurse Handoff Report    ED Chief complaint: Shortness of Breath      ED Diagnosis:   Final diagnoses:   None       Code Status: DNR / DNI    Allergies: No Known Allergies    Patient Story: patient brought to ED from home with reports of SOB/COPD exacerbation. Patient placed on BiPap in ED, respirations less labored now. Hx of collapsed lung from previous BiPap experience, pt anxious required ativan today. IV antibiotics infusing. Family at bedside.   After speaking with hospitalist patient and family decided to switch to comfort care only, plan to discontinue Bipap and keep patient comfortable with medications and supplemental oxygen such as nasal cannula. Plan to initiate outpatient hospice.       Focused Assessment:  See above    Treatments and/or interventions provided: see MAR, BiPap  Patient's response to treatments and/or interventions: tolerating BiPap, less SOB, less labored breathing    To be done/followed up on inpatient unit:  none at this time    Does this patient have any cognitive concerns?: N/A    Activity level - Baseline/Home:  Independent  Activity Level - Current:   Stand with assist x2    Patient's Preferred language: English   Needed?: No    Isolation: None  Infection: Not Applicable  Patient tested for COVID 19 prior to admission: YES  Bariatric?: No    Vital Signs:   Vitals:    09/08/21 1006 09/08/21 1015 09/08/21 1030 09/08/21 1045   BP: (!) 148/87 (!) 168/86 (!) 144/103 103/81   Pulse: 90 81 80 76   Resp: (!) 32  (!) 53 (!) 36   Temp: 98.2  F (36.8  C)      TempSrc: Temporal      SpO2: 98% 100% 100% 99%       Cardiac Rhythm:Cardiac Rhythm: Normal sinus rhythm    Was the PSS-3 completed:   Yes  What interventions are required if any?               Family Comments: family at bedside- involved in plan of care  OBS brochure/video discussed/provided to patient/family: Yes              Name of person given brochure if not patient:                Relationship to patient:     For the majority of the shift this patient's behavior was Green.   Behavioral interventions performed were .    ED NURSE PHONE NUMBER: [phone]

## 2021-09-08 NOTE — ED PROVIDER NOTES
History     Chief Complaint:  Shortness of Breath    The history is provided by the patient and the EMS personnel.      Flavia Luu is a 76 year old male with a history of A flutter, COPD, emphysema and right pneumothorax who is currently anticoagulated on Eliquis and is DNR/DNI who presents with shortness of breath. EMS reports that the patient has had increasing shortness of breath at home. The patient states that he is unable to walk to the bathroom without becoming significantly short of breath. The patient was recently admitted on 8/30 for COPD exacerbation and was interested in enrolling in hospice. He states that he is interested in comfort care at this time. The patient confirms his code status as DNR/DNI but reports that he would be interested in a chest tube if indicated. The patient denies any fever, chest pain or new cough. He denies a history of Covid-19 infection. He is fully vaccinated against Covid-19.     Review of Systems   Constitutional: Negative for fever.   Respiratory: Positive for cough and shortness of breath.    Cardiovascular: Negative for chest pain.   All other systems reviewed and are negative.    Allergies:  No Known Allergies    Medications:    Albuterol   Tessalon   Eliquis   Advair   Duoneb  Metoprolol   Prednisone   Daliresp     Past Medical History:    A flutter   COPD   Emphysema   Pneumothorax right      Past Surgical History:    A flutter ablation   Vein stripping procedure     Family History:    Cancer- mother   Diabetes- father     Social History:  Former smoker        Physical Exam     Patient Vitals for the past 24 hrs:   BP Temp Temp src Pulse Resp SpO2   09/08/21 1400 (!) 78/51 -- -- 81 26 98 %   09/08/21 1223 114/58 -- -- 84 (!) 31 99 %   09/08/21 1153 -- -- -- 79 18 100 %   09/08/21 1145 117/62 -- -- 80 (!) 36 99 %   09/08/21 1141 -- -- -- 81 30 99 %   09/08/21 1118 -- -- -- 82 25 98 %   09/08/21 1115 123/63 -- -- 78 29 99 %   09/08/21 1045 103/81 -- -- 76  (!) 36 99 %   09/08/21 1030 (!) 144/103 -- -- 80 (!) 53 100 %   09/08/21 1015 (!) 168/86 -- -- 81 -- 100 %   09/08/21 1006 (!) 148/87 98.2  F (36.8  C) Temporal 90 (!) 32 98 %       Physical Exam  VS: Reviewed per above  HENT: Mucous membranes moist, trachea midline  EYES: sclera anicteric  CV: Rate as noted, regular rhythm.   RESP: Tachypnea, decreased breath sounds bilaterally.  GI: no tenderness/rebound/guarding, not distended.  NEURO: Alert, moving all extremities  MSK: No deformity of the extremities  SKIN: Warm and dry    Emergency Department Course   ECG:  ECG taken at 1027, ECG read at 1027  Normal sinus rhythm   Incomplete right bundle branch blockage    No change as compared to prior, dated 8/30/2021.  Rate 83 bpm. NE interval 154 ms. QRS duration 92 ms. QT/QTc 352/413 ms. P-R-T axes 89 70 72.     Imaging:    Chest XR:  Portable chest. Lungs are hyperinflated with scattered   chronic-appearing interstitial changes likely related to COPD. No new   infiltrate or lung consolidation. Heart is normal in size. No   pneumothorax. Previously described trace pleural fluid has resolved.  Reading per radiology    Laboratory:    ISTAT gases lactate tripp POCT 1111: pH: 7.30(L), PCO2: 78(HH), PO2: 31, Bicarbonate: 38(H), O2 Sat: 50(L), Lactic acid: 0.6    ISTAT gases lactate tripp POCT 1015: pH: 7.33, PCO2: 75(H), PO2: 58(H), Bicarbonate: 40(H), O2 Sat: 86(L), Lactic acid: 0.6    Asymptomatic COVID19 Virus PCR by nasopharyngeal swab: negative      CBC: WBC 7.3, HGB 13.1(L),      BMP: Na 124 (L) Cl 89 (L) CO2 36 (H) anion gap <1 (L) glucose 114 (H) o/w WNL (Creatinine 0.68)     Troponin (Collected 1017): <0.015    BNP: 600     Emergency Department Course:    Reviewed:  I reviewed nursing notes, vitals, past history and care everywhere    Assessments:  1002 I obtained history and examined the patient as noted above.     1005 The patient confirms code status as DNR/DNi. He reports that he would like a chest tube if  indicated.     1008 Patient was placed on BiPAP.     1013 I performed an ultrasound on the patient.     1018 I personally reviewed the patient's chest XR.      1120 I rechecked the patient and explained findings.     1200 Admitting physician spoke to patient and family and they are moving to comfort cares only.     Consults:   1125 I spoke with Dr. Taylor of the Hospitalist service from [facility] regarding patient's presentation, findings, and plan of care.    Interventions:  1012 Solumedrol 125 mg IV     1021 Duoneb, 3 mL, nebulization     1022 Ativan 0.5 mg IV    1042 Rocephin 2 g IV     1111 Azithromycin 500 mg IV     1214 Morphine 2 mg IV     1214 Ativan 1 mg sublingual     Disposition:  The patient was admitted to the hospital under the care of Dr. Taylor.    Impression & Plan      Medical Decision Making:  Patient presents to the ER for evaluation of shortness of breath.  On arrival vital signs are reassuring aside from tachypnea up to 35 breaths/min.  He has minimal air movement.  Per chart review, patient was just admitted and discharged with COPD flare.  There has been discussion of initiating hospice although this has not happened yet.  Patient was placed on BiPAP and given DuoNeb's and IV steroids and empiric antibiotics.  Chest x-ray not show infiltrate or pulmonary edema or pneumothorax.  After period on BiPAP, patient's respiratory status improved.  Ultimately he was admitted to the hospitalist for further cares.  Hospitalist met with patient and family and they decided they would like to pursue hospice/comfort cares at this time.  While awaiting admission, patient was initiated on comfort medications per hospitalist orders.  I was notified later in my shift of hypotensive blood pressure of 78/51.  Given new goals of care, no restorative actions were performed.    Critical Care time:  was 40 minutes for this patient excluding procedures.      Diagnosis:    ICD-10-CM    1. COPD  exacerbation (H)  J44.1 Hospice Referral       Scribe Disclosure:  I, Luisa Wagoner, am serving as a scribe at 10:17 AM on 9/8/2021 to document services personally performed by Mario Alberto Bailey MD based on my observations and the provider's statements to me.       Mario Alberto Bailey MD  09/08/21 1525

## 2021-09-08 NOTE — ED NOTES
"I was asked to assist this patients wife with possibly going home with hospice.  In conversation this patient is with [facility] hospice.  I asked if this patient's wife wanted this patient to return home today to sign papers for hospice. She said \"NO! I was told he can stay over night and he will go home tomorrow when the papers are signed.\"  I updated Melissa the [facility] hospice Liaison and she is working with Jhony to possibly still have this patient return home later today.  This patient is signed onto hospice and could just discharge back to home and they would see him tomorrow. There is some confusion with this patients wife and Jhony is going to reach out to her today.  I will update staff as the plan evolves.  "

## 2021-09-08 NOTE — ED NOTES
After speaking with hospitalist patient and family decided to switch to comfort care only, plan to discontinue Bipap and keep patient comfortable with medications and supplemental oxygen such as nasal cannula.

## 2021-09-08 NOTE — ED NOTES
Assumed care of patient. Patient tolerating BiPap at this time. VSS. Family at bedside, IV antibiotics infusing. Patient and family reporting that they contacted hospice to potentially place patient in their care. Patient and family reports that patient is DNR/DNI.

## 2021-09-08 NOTE — ED NOTES
Per hospice, wife wants patient to stay over night and understands that hospice papers will be signed tomorrow at home.  Wife will pick patient up at 1:30 pm tomorrow.  If there is any opportunity where this wife agrees this patient can be discharged home at any time.  Hopsice will follow patient tomorrow.

## 2021-09-08 NOTE — TELEPHONE ENCOUNTER
"Wife called because patient was very weak this morning, can barely get out of the chair. extremely short of breath at rest, groaning and struggling to do the nebulizer.   Wife instructed to go to ER and was agreeable to this and is going to call 911 to assist with getting patient there     Seven Mcfarland RN     Reason for Disposition    SEVERE difficulty breathing (e.g., struggling for each breath, speaks in single words, pulse > 120)    Additional Information    Negative: Breathing stopped and hasn't returned    Negative: Choking on something    Answer Assessment - Initial Assessment Questions  1. RESPIRATORY STATUS: \"Describe your breathing?\" (e.g., wheezing, shortness of breath, unable to speak, severe coughing)       SOB at rest  2. ONSET: \"When did this breathing problem begin?\"       Worse yesterday  3. PATTERN \"Does the difficult breathing come and go, or has it been constant since it started?\"       constant  4. SEVERITY: \"How bad is your breathing?\" (e.g., mild, moderate, severe)     - MILD: No SOB at rest, mild SOB with walking, speaks normally in sentences, can lay down, no retractions, pulse < 100.     - MODERATE: SOB at rest, SOB with minimal exertion and prefers to sit, cannot lie down flat, speaks in phrases, mild retractions, audible wheezing, pulse 100-120.     - SEVERE: Very SOB at rest, speaks in single words, struggling to breathe, sitting hunched forward, retractions, pulse > 120       severe  5. RECURRENT SYMPTOM: \"Have you had difficulty breathing before?\" If so, ask: \"When was the last time?\" and \"What happened that time?\"       COPD  6. CARDIAC HISTORY: \"Do you have any history of heart disease?\" (e.g., heart attack, angina, bypass surgery, angioplasty)         7. LUNG HISTORY: \"Do you have any history of lung disease?\"  (e.g., pulmonary embolus, asthma, emphysema)        8. CAUSE: \"What do you think is causing the breathing problem?\"         9. OTHER SYMPTOMS: \"Do you have any other symptoms? " "(e.g., dizziness, runny nose, cough, chest pain, fever)        10. PREGNANCY: \"Is there any chance you are pregnant?\" \"When was your last menstrual period?\"          11. TRAVEL: \"Have you traveled out of the country in the last month?\" (e.g., travel history, exposures)    Protocols used: BREATHING DIFFICULTY-A-[place]      "

## 2021-09-08 NOTE — CONSULTS
Writer spoke with patients spouse, Linda regarding discharge planning.  Writer explained that hospice can enroll patient tomorrow following discharge from hospital.  Hospice had planned admission time for 1:00 PM on 9/9 but spouse requested 2:00 PM so writer will determine specific time and update care team once known.  Spouse requested to transport patient herself tomorrow between 1:00-2:00 PM.  Writer explained that discharge time will ultimately be determined by hospital which will coordinate with hospice.  Please reach out with any additional questions or concerns.  Writer will continue to follow and assist with discharge planning.      Melissa Trimble RN  [facility] Hospice referral specialist   [phone]

## 2021-09-09 ENCOUNTER — TELEPHONE (OUTPATIENT)
Dept: INTERNAL MEDICINE | Facility: CLINIC | Age: 76
End: 2021-09-09

## 2021-09-09 ENCOUNTER — MEDICAL CORRESPONDENCE (OUTPATIENT)
Dept: HEALTH INFORMATION MANAGEMENT | Facility: CLINIC | Age: 76
End: 2021-09-09

## 2021-09-09 VITALS
TEMPERATURE: 98.2 F | OXYGEN SATURATION: 91 % | HEART RATE: 78 BPM | DIASTOLIC BLOOD PRESSURE: 55 MMHG | RESPIRATION RATE: 24 BRPM | SYSTOLIC BLOOD PRESSURE: 95 MMHG

## 2021-09-09 PROCEDURE — 250N000009 HC RX 250: Performed by: STUDENT IN AN ORGANIZED HEALTH CARE EDUCATION/TRAINING PROGRAM

## 2021-09-09 PROCEDURE — 94640 AIRWAY INHALATION TREATMENT: CPT

## 2021-09-09 PROCEDURE — 250N000011 HC RX IP 250 OP 636: Performed by: STUDENT IN AN ORGANIZED HEALTH CARE EDUCATION/TRAINING PROGRAM

## 2021-09-09 PROCEDURE — 250N000013 HC RX MED GY IP 250 OP 250 PS 637: Performed by: STUDENT IN AN ORGANIZED HEALTH CARE EDUCATION/TRAINING PROGRAM

## 2021-09-09 PROCEDURE — 250N000013 HC RX MED GY IP 250 OP 250 PS 637: Performed by: INTERNAL MEDICINE

## 2021-09-09 PROCEDURE — 999N000157 HC STATISTIC RCP TIME EA 10 MIN

## 2021-09-09 PROCEDURE — 99239 HOSP IP/OBS DSCHRG MGMT >30: CPT | Performed by: INTERNAL MEDICINE

## 2021-09-09 RX ORDER — MORPHINE SULFATE 100 MG/5ML
5-10 SOLUTION ORAL EVERY 4 HOURS PRN
Qty: 90 ML | Refills: 0 | Status: SHIPPED | OUTPATIENT
Start: 2021-09-09

## 2021-09-09 RX ORDER — LORAZEPAM 2 MG/ML
.5-1 CONCENTRATE ORAL EVERY 4 HOURS PRN
Qty: 30 ML | Refills: 5 | Status: SHIPPED | OUTPATIENT
Start: 2021-09-09

## 2021-09-09 RX ADMIN — METHYLPREDNISOLONE SODIUM SUCCINATE 40 MG: 40 INJECTION, POWDER, FOR SOLUTION INTRAMUSCULAR; INTRAVENOUS at 08:49

## 2021-09-09 RX ADMIN — IPRATROPIUM BROMIDE AND ALBUTEROL SULFATE 3 ML: .5; 3 SOLUTION RESPIRATORY (INHALATION) at 10:54

## 2021-09-09 RX ADMIN — IPRATROPIUM BROMIDE AND ALBUTEROL SULFATE 3 ML: .5; 3 SOLUTION RESPIRATORY (INHALATION) at 08:53

## 2021-09-09 RX ADMIN — ACETAMINOPHEN 650 MG: 325 TABLET, FILM COATED ORAL at 05:35

## 2021-09-09 RX ADMIN — LORAZEPAM 1 MG: 2 INJECTION INTRAMUSCULAR; INTRAVENOUS at 13:07

## 2021-09-09 RX ADMIN — ROFLUMILAST 500 MCG: 500 TABLET ORAL at 08:49

## 2021-09-09 RX ADMIN — METOPROLOL TARTRATE 12.5 MG: 25 TABLET, FILM COATED ORAL at 08:49

## 2021-09-09 RX ADMIN — FLUTICASONE FUROATE AND VILANTEROL TRIFENATATE 1 PUFF: 200; 25 POWDER RESPIRATORY (INHALATION) at 13:31

## 2021-09-09 ASSESSMENT — ACTIVITIES OF DAILY LIVING (ADL)
ADLS_ACUITY_SCORE: 15

## 2021-09-09 NOTE — PROGRESS NOTES
RECEIVING UNIT ED HANDOFF REVIEW    ED Nurse Handoff Report was reviewed by: Verónica Mirza RN on September 8, 2021 at 9:13 PM

## 2021-09-09 NOTE — CONSULTS
Consult for hospice acknowledged. Per chart, patient has been enrolled with [facility] and plans to discharge today at 1330. Consult cleared.     Writer confirmed plan with Melissa with [facility]. Patient's wife will meet patient at door 6 at 1330. She will call when she's leaving to update bedside nurse. If meds are not filled here, hospice will order them today at home meeting. They will meet patient and family at home at 1400.    ELIZ Hicks  Inpatient   [facility]

## 2021-09-09 NOTE — PROGRESS NOTES
Please plan to discharge patient with the following medications if possible; Medication recommendations:       Lorazepam 2mg/mL; Take 0.5-1mg (0.25-0.5mL)) PO/SL every 4 hours as needed for anxiety or restlessness.    Morphine sulfate 20mg/mL; take 5-10 mg(0.25-0.5mL)  Q4H PRN for pain / dyspnea       Thank you for this consult,    Melissa Trimble RN  [facility] referral specialist

## 2021-09-09 NOTE — PLAN OF CARE
Comfort care care patient, discharging home on hospice, spouse to pick patient home, discharge medications handed to to patient, patient started having SOB with activities, on 2 liters of oxygen, prn ativan given per patient request, oxygen increased to 4 liters to help patient catcher his breath, oxygen reduced back to 2 liters, patient breathings seems better.

## 2021-09-09 NOTE — TELEPHONE ENCOUNTER
Would PCP be will to follower patient while in Hospice?     [facility] needs confirmation. Please call back Jhony in intake call information.     Veronika Cao RN  -[facility]

## 2021-09-09 NOTE — PLAN OF CARE
A/O x4 , 0n O2 3LPM, admitted with SOB on comfort care, on breathing treatment, headache well managed by tylenol . Ax1,continent, regular diet,   Plan for discharge to hospice

## 2021-09-09 NOTE — CONSULTS
Received Nursing Admission Screen Consult -  Have you recently lost weight without trying? - Yes, Unsure of amount   Have you eaten poorly because of a decreased appetite? - No    Noted plans for Hospice at discharge.  Will be available if more aggressive nutrition interventions desired.    Pat Villatoro RD, LD, CNSC   Clinical Dietitian - [facility]

## 2021-09-09 NOTE — DISCHARGE SUMMARY
[facility]  Hospitalist Discharge Summary      Date of Admission:  9/8/2021  Date of Discharge:  9/9/2021  Discharging Provider: Susie Duarte MD      Discharge Diagnoses   Recurrent COPD exacerbation  Severe, end-stage COPD, oxygen dependent  Chronic hypercarbic respiratory failure due to COPD  Comfort cares  Urinary retention, BPH  Hyponatremia, likely due to SIADH in the setting of severe lung disease  Atrial fibrillation  Pulmonary nodules  Deconditioning    Follow-ups Needed After Discharge   Follow-up Appointments     Follow-up and recommended labs and tests       Follow up per hospice.             Unresulted Labs Ordered in the Past 30 Days of this Admission     No orders found for last 31 day(s).          Discharge Disposition   Discharged to home  Condition at discharge: Stable      Hospital Course      Flavia Luu is a 76 year old male admitted on 9/8/2021. He presents with worsening SOB.    Recurrent COPD exacerbation  Severe, end-stage COPD, oxygen dependent  Chronic hypercarbic respiratory failure due to COPD  Comfort cares  Patient with progressively worsening shortness of breath over the last several weeks.  Recent admission for COPD exacerbation.  Since that time he has not had any improvement and actually has worsened with his breathing.  Family presented to the hospital 9/8/21 due to intolerable symptoms.  They are in touch with [facility] hospice.  Unfortunately hospice is unable to enroll patient today.  We will admit the patient for plan for comfort cares.  We will plan to discharge home with hospice as soon as it is set up.    -Continue PTA COPD meds for comfort  - Appreciate hospice consult, discharged on ativan and morphine PRN as below. Hospice will establish care with patient this afternoon at his home.   -Oxygen as needed for comfort, does use 2 to 3 L via nasal cannula at baseline  -No vitals checks  -Eat for comfort  -Social work consult for  hospice set up    Urinary retention, BPH  -Offered patient Clements catheter, he declines at this point.    Other problems noted on admission that we are not treating due to comfort cares:  Hyponatremia, likely due to SIADH in the setting of severe lung disease  Atrial fibrillation  Pulmonary nodules  Deconditioning  - Continue PTA apixaban for stroke prevention per patients wishes as long as able to take oral medications.   - Continue metoprolol for rate control.      Consultations This Hospital Stay   SPIRITUAL HEALTH SERVICES IP CONSULT  CARE MANAGEMENT / SOCIAL WORK IP CONSULT  SMOKING CESSATION PROGRAM IP CONSULT  RESPIRATORY CARE IP CONSULT    Code Status   No CPR- Do NOT Intubate    Time Spent on this Encounter   I, Susie Duarte MD, personally saw the patient today and spent greater than 30 minutes discharging this patient.       Susie Duarte MD  [place] [address] [facility]  [address] [address]  Phone: [phone]  ______________________________________________________________________    Physical Exam   Vital Signs:     BP: 95/55 Pulse: 78   Resp: 24 SpO2: 91 % O2 Device: Nasal cannula Oxygen Delivery: 3 LPM  Weight: 0 lbs 0 oz  Constitutional: NAD,   Neuropsyche:  alert and oriented, answers questions appropriately. Speech normal, face symmetric and moving all 4 extremities without gross focal neurological deficit.   Respiratory: Breathing with accessory muscle use, pursed lips, marked decreased air exchange, no wheezes, no crackles.   Cardiovascular: Regular rate and rhythm, distant, no edema.  GI:  soft, NT/ND, BS normal  Skin/Integumen:  No acute rash or sign of bleeding.            Primary Care Physician   iFlemon Fletcher    Discharge Orders      Hospice Referral      Reason for your hospital stay    Trouble breathing secondary to COPD, desire to transition to hospice.     Follow-up and recommended labs and tests     Follow up per hospice.     Activity     Your activity upon discharge: activity as tolerated     Oxygen Adult/Peds    I certify that this patient, Flavia Luu has been under my care (or a nurse practitioner or physican's assistant working with me). This is the face-to-face encounter for oxygen medical necessity.      Flavia Luu is now in a chronic stable state and continues to require supplemental oxygen. Patient has continued oxygen desaturation due to Chronic Respiratory Failure with Hypoxia J93.11  COPD J44.9  Pulmonary Fibrosis J84.10.    Resume home O2 for chronic respiratory failure. No change in oxygen needs from prior to admission     **Patients who qualify for home O2 coverage under the CMS guidelines require ABG tests or O2 sat readings obtained closest to, but no earlier than 2 days prior to the discharge, as evidence of the need for home oxygen therapy. Testing must be performed while patient is in the chronic stable state. See notes for O2 sats.**     Diet    Regular Diet Adult       Significant Results and Procedures   Most Recent 3 CBC's:Recent Labs   Lab Test 09/08/21  1017 08/30/21  0959 06/20/21  1216 06/16/21  0644   WBC 7.3 7.4  --  9.2   HGB 13.1* 12.3*  --  12.3*   MCV 95 97  --  99    331 258 238     Most Recent 3 BMP's:Recent Labs   Lab Test 09/08/21  1017 09/01/21  0715 08/31/21  0755   * 131* 128*   POTASSIUM 4.5 4.5 4.7   CHLORIDE 89* 93* 92*   CO2 36* 35* 35*   BUN 18 19 16   CR 0.68 0.73 0.64*   ANIONGAP <1* 3 1*   SAMIRA 8.6 8.9 8.9   * 126* 130*     Most Recent 3 Troponin's:Recent Labs   Lab Test 09/08/21  1017 08/30/21  0959 06/14/21  2116 06/13/21  0322 06/28/18  1700   TROPI  --   --  0.022 <0.015 <0.015   TROPONIN <0.015 <0.015  --   --   --      Most Recent ABG:Recent Labs   Lab Test 09/08/21  1111 06/14/21  2328   PH 7.30* 7.30*   PO2  --  80   PCO2  --  86*   HCO3  --  43*   GABRIELLA  --  12.6           Results for orders placed or performed during the hospital encounter of 09/08/21   XR Chest Port  1 View    Narrative    CHEST ONE VIEW PORTABLE   9/8/2021 10:24 AM     HISTORY: Dyspnea.    COMPARISON: Chest x-ray 8/30/2021.      Impression    IMPRESSION: Portable chest. Lungs are hyperinflated with scattered  chronic-appearing interstitial changes likely related to COPD. No new  infiltrate or lung consolidation. Heart is normal in size. No  pneumothorax. Previously described trace pleural fluid has resolved.    GARETT POSADAS MD         SYSTEM ID:  [id number]       Discharge Medications   Current Discharge Medication List      START taking these medications    Details   LORazepam (ATIVAN) 2 MG/ML (HIGH CONC) solution Take 0.25-0.5 mLs (0.5-1 mg) by mouth or place under tongue every 4 hours as needed for anxiety (restlessness)  Qty: 30 mL, Refills: 5    Associated Diagnoses: End of life care      morphine sulfate, high concentrate, (ROXANOL-CONCENTRATED) 20 MG/ML concentrated solution Take 0.25-0.5 mLs (5-10 mg) by mouth or place under tongue every 4 hours as needed for shortness of breath / dyspnea or pain  Qty: 90 mL, Refills: 0    Comments: Hospice patient. Dispense in quantities of 30 mL.  Associated Diagnoses: End of life care         CONTINUE these medications which have NOT CHANGED    Details   acetaminophen (TYLENOL) 325 MG tablet Take 2 tablets (650 mg) by mouth every 4 hours as needed for mild pain  Qty:      Associated Diagnoses: Pneumothorax on right      albuterol (PROAIR HFA) 108 (90 Base) MCG/ACT inhaler Inhale 1-2 puffs into the lungs every 4 hours as needed for shortness of breath / dyspnea  Qty: 1 Inhaler, Refills: 11    Comments: Pharmacy may dispense brand covered by insurance (Proair, or proventil or ventolin or generic albuterol inhaler)  Associated Diagnoses: Panlobular emphysema (H)      albuterol (PROVENTIL) (2.5 MG/3ML) 0.083% neb solution Take 1 vial (2.5 mg) by nebulization every 2 hours as needed for shortness of breath / dyspnea  Qty: 2 Box, Refills: 11    Associated Diagnoses:  Panlobular emphysema (H)      benzonatate (TESSALON) 100 MG capsule Take 1 capsule (100 mg) by mouth 3 times daily as needed for cough  Qty: 60 capsule, Refills: 0    Associated Diagnoses: COPD exacerbation (H)      ELIQUIS ANTICOAGULANT 5 MG tablet TAKE ONE TABLET BY MOUTH TWICE DAILY FOR AFIB  Qty: 60 tablet, Refills: 11    Comments: review asap  Associated Diagnoses: Atrial fibrillation, unspecified type (H)      fluticasone-salmeterol (ADVAIR) 500-50 MCG/DOSE inhaler Inhale 1 puff into the lungs 2 times daily  Qty: 1 Inhaler, Refills: 11    Associated Diagnoses: Panlobular emphysema (H)      ipratropium - albuterol 0.5 mg/2.5 mg/3 mL (DUONEB) 0.5-2.5 (3) MG/3ML neb solution Take 1 vial (3 mLs) by nebulization 4 times daily  Qty: 360 mL, Refills: 0    Associated Diagnoses: Panlobular emphysema (H)      melatonin 1 MG TABS tablet Take 1 tablet (1 mg) by mouth nightly as needed for sleep  Qty:      Associated Diagnoses: Insomnia, unspecified type      metoprolol tartrate (LOPRESSOR) 25 MG tablet Take 0.5 tablets (12.5 mg) by mouth 2 times daily  Qty: 90 tablet, Refills: 1    Comments: PROFILE FOR FUTURE REFILLS  Associated Diagnoses: Atrial fibrillation, unspecified type (H)      roflumilast (DALIRESP) 500 MCG TABS tablet Take 1 tablet (500 mcg) by mouth daily  Qty: 30 tablet, Refills: 2    Comments: New dose. PROFILE FOR FUTURE REFILLS  Associated Diagnoses: Panlobular emphysema (H)         STOP taking these medications       azithromycin (ZITHROMAX) 250 MG tablet Comments:   Reason for Stopping:         predniSONE (DELTASONE) 10 MG tablet Comments:   Reason for Stopping:             Allergies   No Known Allergies

## 2021-11-16 ENCOUNTER — MEDICAL CORRESPONDENCE (OUTPATIENT)
Dept: HEALTH INFORMATION MANAGEMENT | Facility: CLINIC | Age: 76
End: 2021-11-16
Payer: COMMERCIAL

## 2023-09-16 NOTE — ED PROVIDER NOTES
History   Chief Complaint:  Shortness of Breath       The history is provided by the patient.      Flavia Luu is a 76 year old male with a complex medial history including oxygen depended, COPD, atrial flutter/fib on Eliquis. Recent hospitalization from 6/13 to 6/20 for acute and chronic hypercapnic respiratory failure due to COPD. Plus a spontaneous pneumothorax which progressed to a tension pneumothorax. He presents today with SOB. Yesterday evening he reports SOB that was worse than normal when standing and moving. This morning he said that the SOB was worsening so much that it continued even when sitting. He says that his reader at home says that his oxygen levels are within reason. Uses nebulizer at home, but infrequently. He has vague claims of shoulder blade pain as well. He denies fever, diarrhea, nausea and vomiting. He also denies abnormal cough, chest pain, abdominal pain. He is on steroids. He has previously had a collapsed lung and has previously had elevated CO2 levels. He does not take diuretics or lasix. He denies the use of new mediations.       Review of Systems   Constitutional: Negative for fever.   Respiratory: Positive for shortness of breath. Negative for cough.    Cardiovascular: Negative for chest pain.   Gastrointestinal: Negative for abdominal pain, diarrhea, nausea and vomiting.   All other systems reviewed and are negative.      Allergies:  No Known Allergies    Medications:  albuterol inhaler  albuterol Neb  Eliquis   fluticasone-salmeterol   melatonin   metoprolol tartrate   roflumilast   tiotropium     Past Medical History:    Atrial Flutter/fib  COPD  PVC  Emphysema of lung  Tobacco use disorder  Hypoxemia  Hypercarbia  Pneumothorax which progress to Tension Pneumothorax  Cardiovascular screening  Advanced directives, counseling/discussion    Past Surgical History:    Bilateral Cataract IOL, RT/LT  Ablation of atrial flutter  Vein stripping     Family History:    Cancer  (Mother)  Diabetes (Father)    Social History:  Patient unaccompanied   PCP: Filemon Fletcher    Physical Exam     Patient Vitals for the past 24 hrs:   BP Temp Temp src Pulse Resp SpO2   08/30/21 1559 -- -- -- 104 23 98 %   08/30/21 1546 (!) 155/68 -- -- 106 24 95 %   08/30/21 1545 -- -- -- -- 24 96 %   08/30/21 1500 -- -- -- 105 26 97 %   08/30/21 1102 -- 98.5  F (36.9  C) Temporal -- -- --   08/30/21 1100 -- -- -- 96 18 98 %   08/30/21 1030 -- -- -- 99 28 98 %   08/30/21 1015 -- -- -- 90 24 98 %   08/30/21 0955 -- -- -- -- 25 100 %   08/30/21 0951 (!) 179/76 -- -- 88 -- --       Physical Exam  Constitutional:  Patient is oriented to person, place, and time. They appear well-developed and well-nourished. Mild distress secondary to abnormal SOB.   HENT:   Mouth/Throat:   Oropharynx is clear and moist.   Eyes:    Conjunctivae normal and EOM are normal. Pupils are equal, round, and reactive to light.   Neck:    Normal range of motion.   Cardiovascular: Normal rate, regular rhythm and normal heart sounds. Bilateral pedal anemia. Exam reveals no gallop and no friction rub.  No murmur heard.  Pulmonary/Chest:  Extremely diminished respiration bilaterally. Moderate respiratory distress. Tachypneic. Patient has no wheezes. Patient has no rales.   Abdominal:   Soft. Bowel sounds are normal. Patient exhibits no mass. There is no tenderness. There is no rebound and no guarding.   Musculoskeletal:  Normal range of motion. Patient exhibits no edema.   Neurological:   Patient is alert and oriented to person, place, and time. Patient has normal strength. No cranial nerve deficit or sensory deficit. GCS 15  Skin:   Skin is warm and dry. No rash noted. No erythema.   Psychiatric:   Patient has a normal mood and affect. Patient's behavior is normal. Judgment and thought content normal.         Emergency Department Course   ECG  ECG taken at 1016, ECG read at 1028  Normal Sinus Rhythm. Septal Infarct, age undetermined. Abnormal  ECG   No significant change as compared to prior, dated 6/19/21.  Rate 89 bpm. ME interval 150 ms. QRS duration 80 ms. QT/QTc 346/420 ms. P-R-T axes 80 9 53.     Imaging:  XR Chest Port 1 View:  IMPRESSION  Trace bibasilar pleural fluid. This is just slightly more   prominent at the right base. No new airspace disease. Normal cardiac   silhouette. No evidence for pneumothorax.  Per Radiology.     Laboratory:   CBC: WBC 7.4, HGB 12.3 (L),    1024 Lactic acid: 0.8  BMP: Sodium 127 (L), Chloride 89 (L), CO2 38 (H), Anion Gap <1 (L), Glucose 106 (H) o/w WNL (Creatinine 0.62 (L))  blood gas venous and oxyhgb: pH: 7.30 (L), PCO2: 79 (H), PO2: 40, Bicarbonate: 39 (H), Oxyhgb: 69 (L), FIO2 5L, base excess 9.2 (H)   Troponin (Collected 0959): <0.015  Nt porbnp (BNP): N Terminal Pro BNP Inpatient 614  Asymptomatic COVID19 Virus PCR by nasopharyngeal swab: Negative     Emergency Department Course:    Reviewed:  I reviewed nursing notes, vitals, past medical history and care everywhere    Assessments:  1000 I obtained history and examined the patient as noted above.   1159 I rechecked the patient and explained findings.     Consults:   1204 I spoke with Dr. Herzog of the hospitalist service who is in agrees to accept the patient for admission.      Interventions:  1029 methylprednisolone sodium succinate 125 mg, IV  1030 Ipratropium-albuterol 0.5mg /2.5 mg / 3mL, Neb  1030 Ipratropium-albuterol 0.5mg /2.5 mg / 3mL, Neb  1238 Azithromycin 500 mg, IV    Disposition:  The patient was admitted to the hospital under the care of Dr. Herzog.       Impression & Plan     Medical Decision Making:    Bell is a 76 year old male with worsening SOB. He has oxygen dependent COPD and only nebs once a day. His initial examination showed him to be tachypneic with extremely diminished breath sounds bilaterally, generalized weakness. No other findings. EKG and blood work were obtained. He is hypercarbic. This appears to be a chronic  issue for him. Certainly he has been much worse in the past. I did nebulize him and give him solumedrol. Chest X-Ray is not convincing for any evidence of pneumonia. I did cover him for atypicals given the severity of his chronic lung disease. He is refusing BiPAP at this time despite recommendations. I think that this would decrease his work of breathing, but he is intolerant of this. Lactic acid came back negative as did his Covid-19 test. Kidney function is normal. At this time I will admit him to hospitalist. He is DNR/DNI. Wife came to the Emergency Room and was updated on his status.      Covid-19  Flavia Luu was evaluated during a global COVID-19 pandemic, which necessitated consideration that the patient might be at risk for infection with the SARS-CoV-2 virus that causes COVID-19.   Applicable protocols for evaluation were followed during the patient's care.   COVID-19 was considered as part of the patient's evaluation. The plan for testing is:  a test was obtained during this visit.    Diagnosis:    ICD-10-CM    1. COPD exacerbation (H)  J44.1    2. Hyponatremia  E87.1    3. Hypercarbia  R06.89        Scribe Disclosure:  I, Elijah Palma (trainee) and Jackelyn Kruse (), am serving as a scribe at 10:00 AM on 8/30/2021 to document services personally performed by Cate De Los Santos MD based on my observations and the provider's statements to me.              Cate De Los Santos MD  09/03/21 4827     No indicators present
